# Patient Record
Sex: MALE | Race: WHITE | NOT HISPANIC OR LATINO
[De-identification: names, ages, dates, MRNs, and addresses within clinical notes are randomized per-mention and may not be internally consistent; named-entity substitution may affect disease eponyms.]

---

## 2017-01-05 ENCOUNTER — APPOINTMENT (OUTPATIENT)
Dept: CARDIOLOGY | Facility: CLINIC | Age: 82
End: 2017-01-05

## 2017-01-06 VITALS — BODY MASS INDEX: 23.8 KG/M2 | WEIGHT: 170 LBS | HEIGHT: 71 IN | HEART RATE: 75 BPM

## 2017-01-06 LAB
INR PPP: 2.5 RATIO
QUALITY CONTROL: YES

## 2017-02-02 ENCOUNTER — APPOINTMENT (OUTPATIENT)
Dept: CARDIOLOGY | Facility: CLINIC | Age: 82
End: 2017-02-02

## 2017-02-02 LAB — INR PPP: 1.9 RATIO

## 2017-02-16 ENCOUNTER — APPOINTMENT (OUTPATIENT)
Dept: CARDIOLOGY | Facility: CLINIC | Age: 82
End: 2017-02-16

## 2017-02-16 LAB — INR PPP: 2.8 RATIO

## 2017-03-02 ENCOUNTER — APPOINTMENT (OUTPATIENT)
Dept: CARDIOLOGY | Facility: CLINIC | Age: 82
End: 2017-03-02

## 2017-03-02 VITALS — BODY MASS INDEX: 23.8 KG/M2 | WEIGHT: 170 LBS | HEART RATE: 75 BPM | HEIGHT: 71 IN

## 2017-03-02 LAB
INR PPP: 1.8 RATIO
QUALITY CONTROL: YES

## 2017-03-16 ENCOUNTER — APPOINTMENT (OUTPATIENT)
Dept: CARDIOLOGY | Facility: CLINIC | Age: 82
End: 2017-03-16

## 2017-03-16 VITALS — WEIGHT: 170 LBS | BODY MASS INDEX: 23.8 KG/M2 | HEART RATE: 75 BPM | HEIGHT: 71 IN

## 2017-03-16 LAB
INR PPP: 2.6 RATIO
QUALITY CONTROL: YES

## 2017-03-27 ENCOUNTER — RX RENEWAL (OUTPATIENT)
Age: 82
End: 2017-03-27

## 2017-04-06 ENCOUNTER — APPOINTMENT (OUTPATIENT)
Dept: CARDIOLOGY | Facility: CLINIC | Age: 82
End: 2017-04-06

## 2017-04-06 LAB — INR PPP: 2.6 RATIO

## 2017-04-28 ENCOUNTER — RX RENEWAL (OUTPATIENT)
Age: 82
End: 2017-04-28

## 2017-05-04 ENCOUNTER — APPOINTMENT (OUTPATIENT)
Dept: CARDIOLOGY | Facility: CLINIC | Age: 82
End: 2017-05-04

## 2017-05-04 VITALS — WEIGHT: 170 LBS | HEART RATE: 75 BPM | HEIGHT: 71 IN | BODY MASS INDEX: 23.8 KG/M2

## 2017-05-04 LAB
INR PPP: 2.5 RATIO
QUALITY CONTROL: YES

## 2017-06-01 ENCOUNTER — APPOINTMENT (OUTPATIENT)
Dept: CARDIOLOGY | Facility: CLINIC | Age: 82
End: 2017-06-01

## 2017-06-01 VITALS — HEART RATE: 75 BPM | HEIGHT: 71 IN | WEIGHT: 170 LBS | BODY MASS INDEX: 23.8 KG/M2

## 2017-06-01 LAB
INR PPP: 2.3 RATIO
QUALITY CONTROL: YES

## 2017-06-29 ENCOUNTER — APPOINTMENT (OUTPATIENT)
Dept: CARDIOLOGY | Facility: CLINIC | Age: 82
End: 2017-06-29

## 2017-06-29 VITALS — BODY MASS INDEX: 23.8 KG/M2 | HEIGHT: 71 IN | HEART RATE: 75 BPM | WEIGHT: 170 LBS

## 2017-06-29 LAB — INR PPP: 2.7 RATIO

## 2017-07-27 ENCOUNTER — APPOINTMENT (OUTPATIENT)
Dept: CARDIOLOGY | Facility: CLINIC | Age: 82
End: 2017-07-27
Payer: MEDICARE

## 2017-07-27 VITALS — HEART RATE: 75 BPM | HEIGHT: 71 IN | BODY MASS INDEX: 23.8 KG/M2 | WEIGHT: 170 LBS

## 2017-07-27 PROCEDURE — 99211 OFF/OP EST MAY X REQ PHY/QHP: CPT

## 2017-07-27 PROCEDURE — 85610 PROTHROMBIN TIME: CPT | Mod: QW

## 2017-07-28 LAB — INR PPP: 3.3 RATIO

## 2017-08-22 ENCOUNTER — APPOINTMENT (OUTPATIENT)
Dept: CARDIOLOGY | Facility: CLINIC | Age: 82
End: 2017-08-22
Payer: MEDICARE

## 2017-08-22 VITALS — WEIGHT: 170 LBS | HEIGHT: 71 IN | HEART RATE: 75 BPM | BODY MASS INDEX: 23.8 KG/M2

## 2017-08-22 LAB
INR PPP: 2.1 RATIO
QUALITY CONTROL: YES

## 2017-08-22 PROCEDURE — 99211 OFF/OP EST MAY X REQ PHY/QHP: CPT

## 2017-08-22 PROCEDURE — 85610 PROTHROMBIN TIME: CPT | Mod: QW

## 2017-09-19 ENCOUNTER — APPOINTMENT (OUTPATIENT)
Dept: CARDIOLOGY | Facility: CLINIC | Age: 82
End: 2017-09-19
Payer: MEDICARE

## 2017-09-19 VITALS — BODY MASS INDEX: 23.8 KG/M2 | HEART RATE: 75 BPM | WEIGHT: 170 LBS | HEIGHT: 71 IN

## 2017-09-19 LAB
INR PPP: 2 RATIO
QUALITY CONTROL: YES

## 2017-09-19 PROCEDURE — 99211 OFF/OP EST MAY X REQ PHY/QHP: CPT

## 2017-09-19 PROCEDURE — 85610 PROTHROMBIN TIME: CPT | Mod: QW

## 2017-10-16 ENCOUNTER — RX RENEWAL (OUTPATIENT)
Age: 82
End: 2017-10-16

## 2017-10-19 ENCOUNTER — APPOINTMENT (OUTPATIENT)
Dept: CARDIOLOGY | Facility: CLINIC | Age: 82
End: 2017-10-19
Payer: MEDICARE

## 2017-10-19 VITALS — HEIGHT: 71 IN | HEART RATE: 75 BPM | BODY MASS INDEX: 23.8 KG/M2 | WEIGHT: 170 LBS

## 2017-10-19 LAB
INR PPP: 2.3 RATIO
QUALITY CONTROL: YES

## 2017-10-19 PROCEDURE — 85610 PROTHROMBIN TIME: CPT | Mod: QW

## 2017-10-19 PROCEDURE — 99211 OFF/OP EST MAY X REQ PHY/QHP: CPT

## 2017-10-24 ENCOUNTER — TRANSCRIPTION ENCOUNTER (OUTPATIENT)
Age: 82
End: 2017-10-24

## 2017-11-16 ENCOUNTER — APPOINTMENT (OUTPATIENT)
Dept: CARDIOLOGY | Facility: CLINIC | Age: 82
End: 2017-11-16
Payer: MEDICARE

## 2017-11-16 VITALS — SYSTOLIC BLOOD PRESSURE: 140 MMHG | DIASTOLIC BLOOD PRESSURE: 65 MMHG | HEART RATE: 75 BPM

## 2017-11-16 LAB — INR PPP: 2.1 RATIO

## 2017-11-16 PROCEDURE — 99211 OFF/OP EST MAY X REQ PHY/QHP: CPT

## 2017-11-16 PROCEDURE — 85610 PROTHROMBIN TIME: CPT | Mod: QW

## 2017-12-15 ENCOUNTER — APPOINTMENT (OUTPATIENT)
Dept: CARDIOLOGY | Facility: CLINIC | Age: 82
End: 2017-12-15
Payer: MEDICARE

## 2017-12-15 VITALS — BODY MASS INDEX: 23.8 KG/M2 | HEIGHT: 71 IN | HEART RATE: 75 BPM | WEIGHT: 170 LBS

## 2017-12-15 LAB
INR PPP: 2.3 RATIO
QUALITY CONTROL: YES

## 2017-12-15 PROCEDURE — 85610 PROTHROMBIN TIME: CPT | Mod: QW

## 2017-12-15 PROCEDURE — 99211 OFF/OP EST MAY X REQ PHY/QHP: CPT

## 2018-01-16 ENCOUNTER — APPOINTMENT (OUTPATIENT)
Dept: CARDIOLOGY | Facility: CLINIC | Age: 83
End: 2018-01-16
Payer: MEDICARE

## 2018-01-16 PROCEDURE — 85610 PROTHROMBIN TIME: CPT | Mod: QW

## 2018-01-16 PROCEDURE — 99211 OFF/OP EST MAY X REQ PHY/QHP: CPT

## 2018-01-17 VITALS — WEIGHT: 170 LBS | HEART RATE: 75 BPM | BODY MASS INDEX: 23.8 KG/M2 | HEIGHT: 71 IN

## 2018-01-17 LAB
INR PPP: 1.7 RATIO
INR PPP: 1.7 RATIO
QUALITY CONTROL: YES

## 2018-02-01 ENCOUNTER — APPOINTMENT (OUTPATIENT)
Dept: CARDIOLOGY | Facility: CLINIC | Age: 83
End: 2018-02-01
Payer: MEDICARE

## 2018-02-01 VITALS — HEIGHT: 71 IN | WEIGHT: 170 LBS | BODY MASS INDEX: 23.8 KG/M2 | HEART RATE: 75 BPM

## 2018-02-01 LAB
INR PPP: 2.3 RATIO
QUALITY CONTROL: YES

## 2018-02-01 PROCEDURE — 85610 PROTHROMBIN TIME: CPT | Mod: QW

## 2018-02-01 PROCEDURE — 99211 OFF/OP EST MAY X REQ PHY/QHP: CPT

## 2018-03-09 ENCOUNTER — APPOINTMENT (OUTPATIENT)
Dept: CARDIOLOGY | Facility: CLINIC | Age: 83
End: 2018-03-09
Payer: MEDICARE

## 2018-03-09 VITALS — HEIGHT: 71 IN | HEART RATE: 75 BPM | WEIGHT: 170 LBS | BODY MASS INDEX: 23.8 KG/M2

## 2018-03-09 LAB
INR PPP: 2.2 RATIO
QUALITY CONTROL: YES

## 2018-03-09 PROCEDURE — 99211 OFF/OP EST MAY X REQ PHY/QHP: CPT

## 2018-03-09 PROCEDURE — 85610 PROTHROMBIN TIME: CPT | Mod: QW

## 2018-04-05 ENCOUNTER — APPOINTMENT (OUTPATIENT)
Dept: CARDIOLOGY | Facility: CLINIC | Age: 83
End: 2018-04-05
Payer: MEDICARE

## 2018-04-05 VITALS — BODY MASS INDEX: 23.8 KG/M2 | HEART RATE: 75 BPM | WEIGHT: 170 LBS | HEIGHT: 71 IN

## 2018-04-05 LAB
INR PPP: 3.2 RATIO
QUALITY CONTROL: YES

## 2018-04-05 PROCEDURE — 99211 OFF/OP EST MAY X REQ PHY/QHP: CPT

## 2018-04-05 PROCEDURE — 85610 PROTHROMBIN TIME: CPT | Mod: QW

## 2018-04-23 ENCOUNTER — RX RENEWAL (OUTPATIENT)
Age: 83
End: 2018-04-23

## 2018-04-27 ENCOUNTER — RX RENEWAL (OUTPATIENT)
Age: 83
End: 2018-04-27

## 2018-05-03 ENCOUNTER — APPOINTMENT (OUTPATIENT)
Dept: CARDIOLOGY | Facility: CLINIC | Age: 83
End: 2018-05-03
Payer: MEDICARE

## 2018-05-03 VITALS — HEIGHT: 71 IN | BODY MASS INDEX: 23.8 KG/M2 | WEIGHT: 170 LBS | HEART RATE: 75 BPM

## 2018-05-03 LAB
INR PPP: 3.6 RATIO
QUALITY CONTROL: YES

## 2018-05-03 PROCEDURE — 99211 OFF/OP EST MAY X REQ PHY/QHP: CPT

## 2018-05-03 PROCEDURE — 85610 PROTHROMBIN TIME: CPT | Mod: QW

## 2018-05-24 ENCOUNTER — APPOINTMENT (OUTPATIENT)
Dept: CARDIOLOGY | Facility: CLINIC | Age: 83
End: 2018-05-24
Payer: MEDICARE

## 2018-05-24 VITALS — BODY MASS INDEX: 23.8 KG/M2 | WEIGHT: 170 LBS | HEIGHT: 71 IN | HEART RATE: 75 BPM

## 2018-05-24 LAB
INR PPP: 3.6 RATIO
QUALITY CONTROL: YES

## 2018-05-24 PROCEDURE — 99211 OFF/OP EST MAY X REQ PHY/QHP: CPT

## 2018-05-24 PROCEDURE — 85610 PROTHROMBIN TIME: CPT | Mod: QW

## 2018-06-08 ENCOUNTER — APPOINTMENT (OUTPATIENT)
Dept: CARDIOLOGY | Facility: CLINIC | Age: 83
End: 2018-06-08
Payer: MEDICARE

## 2018-06-08 VITALS — HEIGHT: 71 IN | WEIGHT: 170 LBS | HEART RATE: 75 BPM | BODY MASS INDEX: 23.8 KG/M2

## 2018-06-08 LAB
INR PPP: 2.3 RATIO
QUALITY CONTROL: YES

## 2018-06-08 PROCEDURE — 85610 PROTHROMBIN TIME: CPT | Mod: QW

## 2018-06-08 PROCEDURE — 99211 OFF/OP EST MAY X REQ PHY/QHP: CPT

## 2018-06-12 ENCOUNTER — RX RENEWAL (OUTPATIENT)
Age: 83
End: 2018-06-12

## 2018-06-26 ENCOUNTER — APPOINTMENT (OUTPATIENT)
Dept: CARDIOLOGY | Facility: CLINIC | Age: 83
End: 2018-06-26
Payer: MEDICARE

## 2018-06-26 VITALS — HEART RATE: 75 BPM | BODY MASS INDEX: 23.8 KG/M2 | WEIGHT: 170 LBS | HEIGHT: 71 IN

## 2018-06-26 LAB
INR PPP: 1.9 RATIO
QUALITY CONTROL: YES

## 2018-06-26 PROCEDURE — 85610 PROTHROMBIN TIME: CPT | Mod: QW

## 2018-06-26 PROCEDURE — 93793 ANTICOAG MGMT PT WARFARIN: CPT

## 2018-07-26 ENCOUNTER — APPOINTMENT (OUTPATIENT)
Dept: CARDIOLOGY | Facility: CLINIC | Age: 83
End: 2018-07-26
Payer: MEDICARE

## 2018-07-26 ENCOUNTER — NON-APPOINTMENT (OUTPATIENT)
Age: 83
End: 2018-07-26

## 2018-07-26 VITALS
HEART RATE: 122 BPM | DIASTOLIC BLOOD PRESSURE: 63 MMHG | OXYGEN SATURATION: 94 % | BODY MASS INDEX: 21.84 KG/M2 | HEIGHT: 71 IN | SYSTOLIC BLOOD PRESSURE: 150 MMHG | WEIGHT: 156 LBS

## 2018-07-26 PROCEDURE — 99215 OFFICE O/P EST HI 40 MIN: CPT

## 2018-07-26 PROCEDURE — 93000 ELECTROCARDIOGRAM COMPLETE: CPT

## 2018-07-26 PROCEDURE — 85610 PROTHROMBIN TIME: CPT | Mod: QW

## 2018-07-27 VITALS — BODY MASS INDEX: 21.84 KG/M2 | HEART RATE: 122 BPM | WEIGHT: 156 LBS | HEIGHT: 71 IN

## 2018-08-27 ENCOUNTER — APPOINTMENT (OUTPATIENT)
Dept: CARDIOLOGY | Facility: CLINIC | Age: 83
End: 2018-08-27
Payer: MEDICARE

## 2018-08-27 PROCEDURE — 93306 TTE W/DOPPLER COMPLETE: CPT

## 2018-08-29 ENCOUNTER — APPOINTMENT (OUTPATIENT)
Dept: CARDIOLOGY | Facility: CLINIC | Age: 83
End: 2018-08-29
Payer: MEDICARE

## 2018-08-29 PROCEDURE — 85610 PROTHROMBIN TIME: CPT | Mod: QW

## 2018-08-29 PROCEDURE — 93793 ANTICOAG MGMT PT WARFARIN: CPT

## 2018-08-30 VITALS — WEIGHT: 156 LBS | HEART RATE: 122 BPM | HEIGHT: 71 IN | BODY MASS INDEX: 21.84 KG/M2

## 2018-08-30 LAB
INR PPP: 1.8 RATIO
QUALITY CONTROL: NO

## 2018-09-19 ENCOUNTER — APPOINTMENT (OUTPATIENT)
Dept: CARDIOLOGY | Facility: CLINIC | Age: 83
End: 2018-09-19
Payer: MEDICARE

## 2018-09-19 VITALS — HEIGHT: 71 IN | WEIGHT: 156 LBS | HEART RATE: 122 BPM | BODY MASS INDEX: 21.84 KG/M2

## 2018-09-19 LAB
INR PPP: 2.5 RATIO
QUALITY CONTROL: YES

## 2018-09-19 PROCEDURE — 85610 PROTHROMBIN TIME: CPT | Mod: QW

## 2018-09-19 PROCEDURE — 93793 ANTICOAG MGMT PT WARFARIN: CPT

## 2018-10-17 ENCOUNTER — APPOINTMENT (OUTPATIENT)
Dept: CARDIOLOGY | Facility: CLINIC | Age: 83
End: 2018-10-17
Payer: MEDICARE

## 2018-10-17 VITALS — WEIGHT: 156 LBS | BODY MASS INDEX: 21.84 KG/M2 | HEART RATE: 122 BPM | HEIGHT: 71 IN

## 2018-10-17 LAB
INR PPP: 2 RATIO
QUALITY CONTROL: YES

## 2018-10-17 PROCEDURE — 85610 PROTHROMBIN TIME: CPT | Mod: QW

## 2018-10-17 PROCEDURE — 93793 ANTICOAG MGMT PT WARFARIN: CPT

## 2018-11-14 ENCOUNTER — APPOINTMENT (OUTPATIENT)
Dept: CARDIOLOGY | Facility: CLINIC | Age: 83
End: 2018-11-14
Payer: MEDICARE

## 2018-11-14 VITALS — WEIGHT: 156 LBS | HEIGHT: 71 IN | HEART RATE: 122 BPM | BODY MASS INDEX: 21.84 KG/M2

## 2018-11-14 LAB
INR PPP: 1.8 RATIO
QUALITY CONTROL: YES

## 2018-11-14 PROCEDURE — 85610 PROTHROMBIN TIME: CPT | Mod: QW

## 2018-11-14 PROCEDURE — 93793 ANTICOAG MGMT PT WARFARIN: CPT

## 2018-11-27 ENCOUNTER — APPOINTMENT (OUTPATIENT)
Dept: CARDIOLOGY | Facility: CLINIC | Age: 83
End: 2018-11-27
Payer: MEDICARE

## 2018-11-27 VITALS — BODY MASS INDEX: 21.84 KG/M2 | HEART RATE: 100 BPM | HEIGHT: 71 IN | WEIGHT: 156 LBS

## 2018-11-27 PROCEDURE — 85610 PROTHROMBIN TIME: CPT | Mod: QW

## 2018-11-27 PROCEDURE — 93793 ANTICOAG MGMT PT WARFARIN: CPT

## 2018-11-28 LAB
INR PPP: 2.6 RATIO
QUALITY CONTROL: YES

## 2018-12-27 ENCOUNTER — APPOINTMENT (OUTPATIENT)
Dept: CARDIOLOGY | Facility: CLINIC | Age: 83
End: 2018-12-27
Payer: MEDICARE

## 2018-12-27 VITALS — WEIGHT: 156 LBS | BODY MASS INDEX: 21.84 KG/M2 | HEIGHT: 71 IN | HEART RATE: 100 BPM

## 2018-12-27 LAB
INR PPP: 2.5 RATIO
QUALITY CONTROL: YES

## 2018-12-27 PROCEDURE — 85610 PROTHROMBIN TIME: CPT | Mod: QW

## 2018-12-27 PROCEDURE — 93793 ANTICOAG MGMT PT WARFARIN: CPT

## 2019-01-30 ENCOUNTER — APPOINTMENT (OUTPATIENT)
Dept: CARDIOLOGY | Facility: CLINIC | Age: 84
End: 2019-01-30
Payer: MEDICARE

## 2019-01-30 PROCEDURE — 93793 ANTICOAG MGMT PT WARFARIN: CPT

## 2019-01-30 PROCEDURE — 85610 PROTHROMBIN TIME: CPT | Mod: QW

## 2019-02-06 VITALS — HEIGHT: 71 IN | BODY MASS INDEX: 21.84 KG/M2 | WEIGHT: 156 LBS | HEART RATE: 100 BPM

## 2019-02-07 LAB
INR PPP: 1.7 RATIO
QUALITY CONTROL: YES

## 2019-02-20 ENCOUNTER — APPOINTMENT (OUTPATIENT)
Dept: CARDIOLOGY | Facility: CLINIC | Age: 84
End: 2019-02-20
Payer: MEDICARE

## 2019-02-20 VITALS — WEIGHT: 156 LBS | HEART RATE: 100 BPM | HEIGHT: 71 IN | BODY MASS INDEX: 21.84 KG/M2

## 2019-02-20 LAB
INR PPP: 2.2 RATIO
QUALITY CONTROL: YES

## 2019-02-20 PROCEDURE — 93793 ANTICOAG MGMT PT WARFARIN: CPT

## 2019-02-20 PROCEDURE — 85610 PROTHROMBIN TIME: CPT | Mod: QW

## 2019-03-20 ENCOUNTER — APPOINTMENT (OUTPATIENT)
Dept: CARDIOLOGY | Facility: CLINIC | Age: 84
End: 2019-03-20
Payer: MEDICARE

## 2019-03-20 VITALS — BODY MASS INDEX: 21.84 KG/M2 | HEIGHT: 71 IN | HEART RATE: 100 BPM | WEIGHT: 156 LBS

## 2019-03-20 LAB
INR PPP: 2 RATIO
QUALITY CONTROL: YES

## 2019-03-20 PROCEDURE — 93793 ANTICOAG MGMT PT WARFARIN: CPT

## 2019-03-20 PROCEDURE — 85610 PROTHROMBIN TIME: CPT | Mod: QW

## 2019-03-22 ENCOUNTER — APPOINTMENT (OUTPATIENT)
Dept: CARDIOLOGY | Facility: CLINIC | Age: 84
End: 2019-03-22

## 2019-04-17 ENCOUNTER — APPOINTMENT (OUTPATIENT)
Dept: CARDIOLOGY | Facility: CLINIC | Age: 84
End: 2019-04-17
Payer: MEDICARE

## 2019-04-17 VITALS — HEART RATE: 100 BPM | HEIGHT: 71 IN | WEIGHT: 156 LBS | BODY MASS INDEX: 21.84 KG/M2

## 2019-04-17 LAB
INR PPP: 2.6 RATIO
QUALITY CONTROL: YES

## 2019-04-17 PROCEDURE — 85610 PROTHROMBIN TIME: CPT | Mod: QW

## 2019-04-17 PROCEDURE — 93793 ANTICOAG MGMT PT WARFARIN: CPT

## 2019-05-15 ENCOUNTER — APPOINTMENT (OUTPATIENT)
Dept: CARDIOLOGY | Facility: CLINIC | Age: 84
End: 2019-05-15
Payer: MEDICARE

## 2019-05-15 VITALS — BODY MASS INDEX: 21.84 KG/M2 | HEART RATE: 100 BPM | HEIGHT: 71 IN | WEIGHT: 156 LBS

## 2019-05-15 LAB
INR PPP: 2.3 RATIO
QUALITY CONTROL: YES

## 2019-05-15 PROCEDURE — 85610 PROTHROMBIN TIME: CPT | Mod: QW

## 2019-05-15 PROCEDURE — 93793 ANTICOAG MGMT PT WARFARIN: CPT

## 2019-06-12 ENCOUNTER — APPOINTMENT (OUTPATIENT)
Dept: CARDIOLOGY | Facility: CLINIC | Age: 84
End: 2019-06-12
Payer: MEDICARE

## 2019-06-12 VITALS — BODY MASS INDEX: 21.84 KG/M2 | HEIGHT: 71 IN | WEIGHT: 156 LBS | HEART RATE: 100 BPM

## 2019-06-12 LAB
INR PPP: 1.8 RATIO
QUALITY CONTROL: YES

## 2019-06-12 PROCEDURE — 93793 ANTICOAG MGMT PT WARFARIN: CPT

## 2019-06-12 PROCEDURE — 85610 PROTHROMBIN TIME: CPT | Mod: QW

## 2019-06-26 ENCOUNTER — APPOINTMENT (OUTPATIENT)
Dept: CARDIOLOGY | Facility: CLINIC | Age: 84
End: 2019-06-26
Payer: MEDICARE

## 2019-06-26 VITALS — SYSTOLIC BLOOD PRESSURE: 150 MMHG | DIASTOLIC BLOOD PRESSURE: 63 MMHG | OXYGEN SATURATION: 94 % | HEART RATE: 100 BPM

## 2019-06-26 LAB
INR PPP: 2.1 RATIO
QUALITY CONTROL: YES

## 2019-06-26 PROCEDURE — 93793 ANTICOAG MGMT PT WARFARIN: CPT

## 2019-06-26 PROCEDURE — 85610 PROTHROMBIN TIME: CPT | Mod: QW

## 2019-07-24 ENCOUNTER — APPOINTMENT (OUTPATIENT)
Dept: CARDIOLOGY | Facility: CLINIC | Age: 84
End: 2019-07-24
Payer: MEDICARE

## 2019-07-24 VITALS — HEIGHT: 71 IN | BODY MASS INDEX: 21.84 KG/M2 | HEART RATE: 100 BPM | WEIGHT: 156 LBS

## 2019-07-24 LAB
INR PPP: 2.7 RATIO
QUALITY CONTROL: YES

## 2019-07-24 PROCEDURE — 93793 ANTICOAG MGMT PT WARFARIN: CPT

## 2019-07-24 PROCEDURE — 85610 PROTHROMBIN TIME: CPT | Mod: QW

## 2019-08-21 ENCOUNTER — APPOINTMENT (OUTPATIENT)
Dept: CARDIOLOGY | Facility: CLINIC | Age: 84
End: 2019-08-21
Payer: MEDICARE

## 2019-08-21 VITALS — BODY MASS INDEX: 21.84 KG/M2 | HEART RATE: 100 BPM | WEIGHT: 156 LBS | HEIGHT: 71 IN

## 2019-08-21 LAB
INR PPP: 1.8 RATIO
QUALITY CONTROL: YES

## 2019-08-21 PROCEDURE — 85610 PROTHROMBIN TIME: CPT | Mod: QW

## 2019-08-21 PROCEDURE — 93793 ANTICOAG MGMT PT WARFARIN: CPT

## 2019-09-03 ENCOUNTER — RX RENEWAL (OUTPATIENT)
Age: 84
End: 2019-09-03

## 2019-09-06 ENCOUNTER — RX RENEWAL (OUTPATIENT)
Age: 84
End: 2019-09-06

## 2019-09-18 ENCOUNTER — APPOINTMENT (OUTPATIENT)
Dept: CARDIOLOGY | Facility: CLINIC | Age: 84
End: 2019-09-18
Payer: MEDICARE

## 2019-09-18 VITALS — HEART RATE: 100 BPM | WEIGHT: 156 LBS | HEIGHT: 71 IN | BODY MASS INDEX: 21.84 KG/M2

## 2019-09-18 LAB
INR PPP: 2.3 RATIO
QUALITY CONTROL: YES

## 2019-09-18 PROCEDURE — 85610 PROTHROMBIN TIME: CPT | Mod: QW

## 2019-09-18 PROCEDURE — 93793 ANTICOAG MGMT PT WARFARIN: CPT

## 2019-10-16 ENCOUNTER — APPOINTMENT (OUTPATIENT)
Dept: CARDIOLOGY | Facility: CLINIC | Age: 84
End: 2019-10-16
Payer: MEDICARE

## 2019-10-16 VITALS — DIASTOLIC BLOOD PRESSURE: 63 MMHG | OXYGEN SATURATION: 94 % | SYSTOLIC BLOOD PRESSURE: 150 MMHG | HEART RATE: 100 BPM

## 2019-10-16 LAB
INR PPP: 2.2 RATIO
QUALITY CONTROL: YES

## 2019-10-16 PROCEDURE — 85610 PROTHROMBIN TIME: CPT | Mod: QW

## 2019-10-16 PROCEDURE — 93793 ANTICOAG MGMT PT WARFARIN: CPT

## 2019-11-19 ENCOUNTER — APPOINTMENT (OUTPATIENT)
Dept: CARDIOLOGY | Facility: CLINIC | Age: 84
End: 2019-11-19
Payer: MEDICARE

## 2019-11-19 VITALS — WEIGHT: 156 LBS | HEIGHT: 71 IN | HEART RATE: 95 BPM | BODY MASS INDEX: 21.84 KG/M2

## 2019-11-19 LAB
INR PPP: 2.1 RATIO
QUALITY CONTROL: YES

## 2019-11-19 PROCEDURE — 93793 ANTICOAG MGMT PT WARFARIN: CPT

## 2019-11-19 PROCEDURE — 85610 PROTHROMBIN TIME: CPT | Mod: QW

## 2019-12-18 ENCOUNTER — APPOINTMENT (OUTPATIENT)
Dept: CARDIOLOGY | Facility: CLINIC | Age: 84
End: 2019-12-18
Payer: MEDICARE

## 2019-12-18 VITALS — HEART RATE: 95 BPM | HEIGHT: 71 IN | BODY MASS INDEX: 21.84 KG/M2 | WEIGHT: 156 LBS

## 2019-12-18 LAB
INR PPP: 2.5 RATIO
QUALITY CONTROL: YES

## 2019-12-18 PROCEDURE — 93793 ANTICOAG MGMT PT WARFARIN: CPT

## 2019-12-18 PROCEDURE — 85610 PROTHROMBIN TIME: CPT | Mod: QW

## 2020-01-16 ENCOUNTER — APPOINTMENT (OUTPATIENT)
Dept: CARDIOLOGY | Facility: CLINIC | Age: 85
End: 2020-01-16
Payer: MEDICARE

## 2020-01-16 VITALS — BODY MASS INDEX: 21.84 KG/M2 | WEIGHT: 156 LBS | HEART RATE: 95 BPM | HEIGHT: 71 IN

## 2020-01-16 LAB
INR PPP: 3.1 RATIO
QUALITY CONTROL: YES

## 2020-01-16 PROCEDURE — 93793 ANTICOAG MGMT PT WARFARIN: CPT

## 2020-01-16 PROCEDURE — 85610 PROTHROMBIN TIME: CPT | Mod: QW

## 2020-02-18 ENCOUNTER — APPOINTMENT (OUTPATIENT)
Dept: CARDIOLOGY | Facility: CLINIC | Age: 85
End: 2020-02-18
Payer: MEDICARE

## 2020-02-18 VITALS — BODY MASS INDEX: 21.84 KG/M2 | WEIGHT: 156 LBS | HEART RATE: 95 BPM | HEIGHT: 71 IN

## 2020-02-18 LAB
INR PPP: 2.5 RATIO
QUALITY CONTROL: YES

## 2020-02-18 PROCEDURE — 85610 PROTHROMBIN TIME: CPT | Mod: QW

## 2020-02-18 PROCEDURE — 93793 ANTICOAG MGMT PT WARFARIN: CPT

## 2020-03-25 ENCOUNTER — APPOINTMENT (OUTPATIENT)
Dept: CARDIOLOGY | Facility: CLINIC | Age: 85
End: 2020-03-25

## 2020-03-31 LAB
INR PPP: 2.11 RATIO
PT BLD: 24.4 SEC

## 2020-04-26 LAB — INR PPP: 2.08

## 2020-05-12 ENCOUNTER — RX RENEWAL (OUTPATIENT)
Age: 85
End: 2020-05-12

## 2020-05-15 ENCOUNTER — APPOINTMENT (OUTPATIENT)
Dept: CARDIOLOGY | Facility: CLINIC | Age: 85
End: 2020-05-15

## 2020-05-24 ENCOUNTER — OUTPATIENT (OUTPATIENT)
Dept: OUTPATIENT SERVICES | Facility: HOSPITAL | Age: 85
LOS: 1 days | End: 2020-05-24

## 2020-05-26 ENCOUNTER — OUTPATIENT (OUTPATIENT)
Dept: OUTPATIENT SERVICES | Facility: HOSPITAL | Age: 85
LOS: 1 days | End: 2020-05-26

## 2020-06-02 ENCOUNTER — OUTPATIENT (OUTPATIENT)
Dept: OUTPATIENT SERVICES | Facility: HOSPITAL | Age: 85
LOS: 1 days | End: 2020-06-02

## 2020-06-05 ENCOUNTER — OUTPATIENT (OUTPATIENT)
Dept: OUTPATIENT SERVICES | Facility: HOSPITAL | Age: 85
LOS: 1 days | End: 2020-06-05

## 2020-06-09 ENCOUNTER — OUTPATIENT (OUTPATIENT)
Dept: OUTPATIENT SERVICES | Facility: HOSPITAL | Age: 85
LOS: 1 days | End: 2020-06-09

## 2020-06-16 ENCOUNTER — OUTPATIENT (OUTPATIENT)
Dept: OUTPATIENT SERVICES | Facility: HOSPITAL | Age: 85
LOS: 1 days | End: 2020-06-16

## 2020-06-19 DIAGNOSIS — Z51.81 ENCOUNTER FOR THERAPEUTIC DRUG LVL MONITORING: ICD-10-CM

## 2020-06-19 DIAGNOSIS — Z79.01 LONG TERM (CURRENT) USE OF ANTICOAGULANTS: ICD-10-CM

## 2020-06-19 DIAGNOSIS — Z79.01 ENCOUNTER FOR THERAPEUTIC DRUG LVL MONITORING: ICD-10-CM

## 2020-06-23 LAB — INR PPP: 1.99

## 2020-07-08 LAB — INR PPP: 2.86

## 2020-08-03 LAB — INR PPP: 2.87

## 2020-09-01 LAB — INR PPP: 3.31

## 2020-09-14 LAB — INR PPP: 3.7

## 2020-09-22 LAB — INR PPP: 2.6

## 2020-09-29 LAB — INR PPP: 1.38

## 2020-10-05 LAB — INR PPP: 1.52

## 2020-10-13 LAB — INR PPP: 1.93

## 2020-11-05 LAB — INR PPP: 3.07

## 2020-11-25 LAB — INR PPP: 2.51

## 2020-12-22 ENCOUNTER — RX RENEWAL (OUTPATIENT)
Age: 85
End: 2020-12-22

## 2020-12-22 LAB — INR PPP: 2.57

## 2020-12-23 ENCOUNTER — RX RENEWAL (OUTPATIENT)
Age: 85
End: 2020-12-23

## 2020-12-28 ENCOUNTER — RX RENEWAL (OUTPATIENT)
Age: 85
End: 2020-12-28

## 2021-01-19 LAB — INR PPP: 2.15

## 2021-02-17 LAB — INR PPP: 2.49

## 2021-03-18 LAB — INR PPP: 3.4

## 2021-03-31 LAB — INR PPP: 2.3

## 2021-04-21 LAB — INR PPP: 2.37

## 2021-05-14 LAB — INR PPP: 2.57

## 2021-06-17 LAB — INR PPP: 2.65

## 2021-07-16 LAB — INR PPP: 2.73

## 2021-08-13 LAB — INR PPP: 2.48

## 2021-08-20 ENCOUNTER — APPOINTMENT (OUTPATIENT)
Dept: CARDIOLOGY | Facility: CLINIC | Age: 86
End: 2021-08-20
Payer: MEDICARE

## 2021-08-20 ENCOUNTER — NON-APPOINTMENT (OUTPATIENT)
Age: 86
End: 2021-08-20

## 2021-08-20 VITALS
DIASTOLIC BLOOD PRESSURE: 51 MMHG | OXYGEN SATURATION: 99 % | HEIGHT: 71 IN | HEART RATE: 65 BPM | WEIGHT: 135 LBS | BODY MASS INDEX: 18.9 KG/M2 | SYSTOLIC BLOOD PRESSURE: 190 MMHG

## 2021-08-20 PROCEDURE — 99215 OFFICE O/P EST HI 40 MIN: CPT

## 2021-08-20 PROCEDURE — 93000 ELECTROCARDIOGRAM COMPLETE: CPT

## 2021-08-20 NOTE — PHYSICAL EXAM
[General Appearance - Well Developed] : well developed [Normal Appearance] : normal appearance [Well Groomed] : well groomed [General Appearance - Well Nourished] : well nourished [No Deformities] : no deformities [Normal Conjunctiva] : the conjunctiva exhibited no abnormalities [General Appearance - In No Acute Distress] : no acute distress [Eyelids - No Xanthelasma] : the eyelids demonstrated no xanthelasmas [Normal Oral Mucosa] : normal oral mucosa [No Oral Pallor] : no oral pallor [No Oral Cyanosis] : no oral cyanosis [No Jugular Venous Craig A Waves] : no jugular venous craig A waves [Respiration, Rhythm And Depth] : normal respiratory rhythm and effort [Exaggerated Use Of Accessory Muscles For Inspiration] : no accessory muscle use [Auscultation Breath Sounds / Voice Sounds] : lungs were clear to auscultation bilaterally [Abdomen Soft] : soft [Abdomen Tenderness] : non-tender [Abdomen Mass (___ Cm)] : no abdominal mass palpated [Abnormal Walk] : normal gait [Gait - Sufficient For Exercise Testing] : the gait was sufficient for exercise testing [Nail Clubbing] : no clubbing of the fingernails [Cyanosis, Localized] : no localized cyanosis [Petechial Hemorrhages (___cm)] : no petechial hemorrhages [Skin Color & Pigmentation] : normal skin color and pigmentation [] : no rash [No Venous Stasis] : no venous stasis [Skin Lesions] : no skin lesions [No Skin Ulcers] : no skin ulcer [No Xanthoma] : no  xanthoma was observed [Oriented To Time, Place, And Person] : oriented to person, place, and time [Affect] : the affect was normal [Mood] : the mood was normal [No Anxiety] : not feeling anxious [Not Palpable] : not palpable [Normal Rate] : normal [Irregularly Irregular] : irregularly irregular [Normal S1] : normal S1 [Normal S2] : normal S2 [No Gallop] : no gallop heard [I] : a grade 1 [II] : a grade 2 [III] : a grade 3 [2+] : left 2+ [1+] : left 1+ [S3] : no S3 [S4] : no S4 [Right Carotid Bruit] : no bruit heard over the right carotid [Left Carotid Bruit] : no bruit heard over the left carotid [Right Femoral Bruit] : no bruit heard over the right femoral artery [Left Femoral Bruit] : no bruit heard over the left femoral artery [Bruit] : no bruit heard [___ +] : bilateral [unfilled]U+ pitting edema to the knees

## 2021-08-20 NOTE — HISTORY OF PRESENT ILLNESS
[FreeTextEntry1] : Ananth Lilly presented to the office today for a followup cardiovascular evaluation. He was last seen in the office in 2018\par \par  He is now 89 years old, with a history of permanent atrial fibrillation, maintained on rate control therapy and chronic anticoagulant therapy. He has a history of moderate-severe aortic insufficiency and hypertension.  There is an emotional component to his blood pressure.\par \par  When Ananth was last seen in the office, he was feeling reasonably well. \par \par He continues to feel well. He reports no chest discomfort or difficulty breathing that would be suggestive of angina. His exercise capacity is fairly poor, and he generally walks with a walker. He tries to walk 3 driveways before turning around. He denies orthopnea, PND. He is not aware of lower extremity edema, but clearly has some today. He is not sure, when it is pointed out to him, how long it has been present. Apparently, his blood pressures have been well controlled when taken by the physical therapist who visits the house, and by his primary care physician.

## 2021-08-20 NOTE — DISCUSSION/SUMMARY
[FreeTextEntry1] : Ananth has a history of hypertension. He has a history of moderate-severe aortic insufficiency. He has atrial fibrillation. \par \par His exercise capacity is poor, but it is not clear whether this is related to atrial fibrillation, his aortic insufficiency, congestive heart failure, or something else. His blood pressure is extremely high, which likely has a component of reactive hypertension, but is also very likely related to his severe aortic insufficiency, noting the pulse pressure. He is fairly emphatic that his blood pressure has been previously excellent. He will start checking his blood pressure at home over the next week.\par \par He has developed moderate lower extremity edema. He will take Lasix 40 mg daily for the next 3 days, and then call the office for additional instructions. He may require this longer term, and will probably need his blood work repeated next week. He will schedule an echocardiogram to reevaluate his left ventricular dimensions, systolic function and the degree of aortic insufficiency. I would not be surprised if he now has severe aortic insufficiency. We might need to discuss options in terms of valve replacement, pending his clinical course. His age and functional status will be an obstacle.

## 2021-08-20 NOTE — REASON FOR VISIT
[Follow-Up - Clinic] : a clinic follow-up of [Atrial Fibrillation] : atrial fibrillation [Hypertension] : hypertension [FreeTextEntry1] : Aortic valve regurgitation

## 2021-09-09 ENCOUNTER — APPOINTMENT (OUTPATIENT)
Dept: CARDIOLOGY | Facility: CLINIC | Age: 86
End: 2021-09-09
Payer: MEDICARE

## 2021-09-09 PROCEDURE — 93306 TTE W/DOPPLER COMPLETE: CPT

## 2021-09-10 LAB — INR PPP: 2.77

## 2021-09-30 ENCOUNTER — APPOINTMENT (OUTPATIENT)
Dept: CARDIOLOGY | Facility: CLINIC | Age: 86
End: 2021-09-30
Payer: MEDICARE

## 2021-09-30 ENCOUNTER — NON-APPOINTMENT (OUTPATIENT)
Age: 86
End: 2021-09-30

## 2021-09-30 VITALS
HEIGHT: 71 IN | BODY MASS INDEX: 19.46 KG/M2 | HEART RATE: 54 BPM | DIASTOLIC BLOOD PRESSURE: 55 MMHG | WEIGHT: 139 LBS | SYSTOLIC BLOOD PRESSURE: 184 MMHG | OXYGEN SATURATION: 99 %

## 2021-09-30 PROCEDURE — 99215 OFFICE O/P EST HI 40 MIN: CPT

## 2021-09-30 PROCEDURE — 93000 ELECTROCARDIOGRAM COMPLETE: CPT

## 2021-09-30 NOTE — DISCUSSION/SUMMARY
[FreeTextEntry1] : Ananth has a history of hypertension. He has a history of moderate-severe aortic insufficiency. He has atrial fibrillation. \par \par It is notable that he continues to exhibit a very wide pulse pressure here in the office, with a very prominent diastolic murmur.  Overall this is quite consistent with severe aortic insufficiency.  We had an extensive conversation with his sons in the room, and with his daughter, and operating room nurse, by telephone.  We discussed the possibility of a transesophageal echo, which would be worthwhile if he were willing to proceed with some sort of valve surgery, transcatheter or otherwise.  Given his overall clinical condition, it is not likely he will be a good candidate for this.  They will discuss this amongst themselves.\par \par For now, he will continue furosemide once or twice per week.  His edema is only mild to moderate, worse in the left leg than in the right.  He will bring his blood pressure machine in with him in about 6 weeks so that we can evaluate how accurate it is.

## 2021-09-30 NOTE — CARDIOLOGY SUMMARY
[___] : [unfilled] [No Ischemia] : no Ischemia [No Exercise Ind Arr] : no exercise induced arrhythmias [No Symptoms] : no Symptoms [LVEF ___%] : LVEF [unfilled]% [None] : normal LV function [Mild] : mild pulmonary hypertension [Enlarged] : enlarged LA size [Moderate] : moderate mitral regurgitation

## 2021-09-30 NOTE — HISTORY OF PRESENT ILLNESS
[FreeTextEntry1] : Ananth Lilly presented to the office today for a followup cardiovascular evaluation. He was last seen in the office 1 month ago.\par \par  He is now 89 years old, with a history of permanent atrial fibrillation, maintained on rate control therapy and chronic anticoagulant therapy. He has a history of moderate-severe aortic insufficiency and hypertension.  There is an emotional component to his blood pressure.\par \par  When I saw him in the office last month, he had a very wide pulse pressure with severe systolic hypertension.  He had a very prominent diastolic murmur, and I felt that he was likely to have severe aortic insufficiency.  He had lower extremity edema at that time.  I recommended furosemide to be taken for a few days at least, with a follow-up to have his response to treatment reevaluated.  Since then, he has had an echocardiogram which was technically fair.  He only had mild aortic insufficiency noted at that time.  He took the furosemide for a few days, and did not really enjoy it.  He did not like going to the bathroom more often, and in general did not like how it made him feel.  He continues to have his blood pressure taken by the physical therapist, who has found that it is normal.  His blood pressure has been labile when it was checked by a machine at home.\par \par His daughter by phone emphasizes that he has lost about 40 pounds over the last few years.

## 2021-09-30 NOTE — PHYSICAL EXAM
[General Appearance - Well Developed] : well developed [Normal Appearance] : normal appearance [Well Groomed] : well groomed [General Appearance - Well Nourished] : well nourished [No Deformities] : no deformities [General Appearance - In No Acute Distress] : no acute distress [Normal Conjunctiva] : the conjunctiva exhibited no abnormalities [Eyelids - No Xanthelasma] : the eyelids demonstrated no xanthelasmas [Normal Oral Mucosa] : normal oral mucosa [No Oral Pallor] : no oral pallor [No Oral Cyanosis] : no oral cyanosis [No Jugular Venous Craig A Waves] : no jugular venous craig A waves [Respiration, Rhythm And Depth] : normal respiratory rhythm and effort [Exaggerated Use Of Accessory Muscles For Inspiration] : no accessory muscle use [Auscultation Breath Sounds / Voice Sounds] : lungs were clear to auscultation bilaterally [Abdomen Soft] : soft [Abdomen Tenderness] : non-tender [Abdomen Mass (___ Cm)] : no abdominal mass palpated [Abnormal Walk] : normal gait [Gait - Sufficient For Exercise Testing] : the gait was sufficient for exercise testing [Nail Clubbing] : no clubbing of the fingernails [Cyanosis, Localized] : no localized cyanosis [Petechial Hemorrhages (___cm)] : no petechial hemorrhages [Skin Color & Pigmentation] : normal skin color and pigmentation [] : no rash [No Venous Stasis] : no venous stasis [Skin Lesions] : no skin lesions [No Skin Ulcers] : no skin ulcer [No Xanthoma] : no  xanthoma was observed [Oriented To Time, Place, And Person] : oriented to person, place, and time [Affect] : the affect was normal [Mood] : the mood was normal [No Anxiety] : not feeling anxious [Not Palpable] : not palpable [Normal Rate] : normal [Irregularly Irregular] : irregularly irregular [Normal S1] : normal S1 [Normal S2] : normal S2 [No Gallop] : no gallop heard [I] : a grade 1 [II] : a grade 2 [III] : a grade 3 [2+] : left 2+ [1+] : left 1+ [___ +] : bilateral [unfilled]U+ pitting edema to the knees [S3] : no S3 [S4] : no S4 [Right Carotid Bruit] : no bruit heard over the right carotid [Left Carotid Bruit] : no bruit heard over the left carotid [Right Femoral Bruit] : no bruit heard over the right femoral artery [Left Femoral Bruit] : no bruit heard over the left femoral artery [Bruit] : no bruit heard

## 2021-10-11 LAB — INR PPP: 2.64

## 2021-11-10 ENCOUNTER — RX RENEWAL (OUTPATIENT)
Age: 86
End: 2021-11-10

## 2021-11-14 LAB — INR PPP: 3.09

## 2021-12-09 LAB — INR PPP: 2.65

## 2021-12-28 ENCOUNTER — LABORATORY RESULT (OUTPATIENT)
Age: 86
End: 2021-12-28

## 2021-12-29 LAB — INR PPP: 2.38

## 2022-01-25 ENCOUNTER — LABORATORY RESULT (OUTPATIENT)
Age: 87
End: 2022-01-25

## 2022-01-28 LAB — INR PPP: 2.51

## 2022-02-23 ENCOUNTER — LABORATORY RESULT (OUTPATIENT)
Age: 87
End: 2022-02-23

## 2022-02-24 LAB — INR PPP: 2.59

## 2022-03-01 ENCOUNTER — NON-APPOINTMENT (OUTPATIENT)
Age: 87
End: 2022-03-01

## 2022-03-01 ENCOUNTER — APPOINTMENT (OUTPATIENT)
Dept: CARDIOLOGY | Facility: CLINIC | Age: 87
End: 2022-03-01
Payer: MEDICARE

## 2022-03-01 VITALS
DIASTOLIC BLOOD PRESSURE: 47 MMHG | OXYGEN SATURATION: 100 % | HEIGHT: 71 IN | WEIGHT: 134 LBS | BODY MASS INDEX: 18.76 KG/M2 | HEART RATE: 61 BPM | SYSTOLIC BLOOD PRESSURE: 168 MMHG

## 2022-03-01 PROCEDURE — 99215 OFFICE O/P EST HI 40 MIN: CPT

## 2022-03-01 PROCEDURE — 93000 ELECTROCARDIOGRAM COMPLETE: CPT

## 2022-03-01 NOTE — DISCUSSION/SUMMARY
[FreeTextEntry1] : Ananth has a history of hypertension. He has a history of moderate-severe aortic insufficiency. He has atrial fibrillation.  He presents because surgery is being considered.\par \par I had an extensive conversation with Ananth, and with his son in the examining room.  I explained that Ananth is not an ideal candidate for surgery, and that he would need to clear several hurdles in order to make this happen.  Namely, he would have to have an echocardiogram done revealing no change in his ventricular function, and he would also need to have a nuclear stress test performed, given his very limited exercise capacity.  Even if those examinations come out well, he would be considered an elevated risk individual from the perspective of his cardiovascular status, and his risk but not be clearly modifiable.  I will not suggest any additional testing at this time, as he has not even seen the orthopedist yet.  If the orthopedist says that he would be willing to do surgery, based on his own evaluation, I would then consider scheduling the echo and the stress test, to see how he does.\par \par It is notable that he continues to exhibit a very wide pulse pressure here in the office, with a very prominent diastolic murmur.  Overall this is quite consistent with severe aortic insufficiency.  We had an extensive conversation with his sons in the room, and with his daughter, and operating room nurse, by telephone.  We discussed the possibility of a transesophageal echo, which would be worthwhile if he were willing to proceed with some sort of valve surgery, transcatheter or otherwise.  Given his overall clinical condition, it is not likely he will be a good candidate for this.  They will discuss this amongst themselves.\par \par For now, he will continue furosemide once or twice per week.  His edema is only mild to moderate, worse in the left leg than in the right.  He will bring his blood pressure machine in with him in about 6 weeks so that we can evaluate how accurate it is.

## 2022-03-01 NOTE — PHYSICAL EXAM
[General Appearance - Well Developed] : well developed [Normal Appearance] : normal appearance [Well Groomed] : well groomed [General Appearance - Well Nourished] : well nourished [No Deformities] : no deformities [General Appearance - In No Acute Distress] : no acute distress [Normal Conjunctiva] : the conjunctiva exhibited no abnormalities [Eyelids - No Xanthelasma] : the eyelids demonstrated no xanthelasmas [Normal Oral Mucosa] : normal oral mucosa [No Oral Pallor] : no oral pallor [No Oral Cyanosis] : no oral cyanosis [No Jugular Venous Craig A Waves] : no jugular venous craig A waves [Respiration, Rhythm And Depth] : normal respiratory rhythm and effort [Exaggerated Use Of Accessory Muscles For Inspiration] : no accessory muscle use [Auscultation Breath Sounds / Voice Sounds] : lungs were clear to auscultation bilaterally [Abdomen Soft] : soft [Abdomen Tenderness] : non-tender [Abdomen Mass (___ Cm)] : no abdominal mass palpated [Abnormal Walk] : normal gait [Gait - Sufficient For Exercise Testing] : the gait was sufficient for exercise testing [Nail Clubbing] : no clubbing of the fingernails [Cyanosis, Localized] : no localized cyanosis [Petechial Hemorrhages (___cm)] : no petechial hemorrhages [Skin Color & Pigmentation] : normal skin color and pigmentation [] : no rash [No Venous Stasis] : no venous stasis [No Skin Ulcers] : no skin ulcer [Skin Lesions] : no skin lesions [No Xanthoma] : no  xanthoma was observed [Oriented To Time, Place, And Person] : oriented to person, place, and time [Affect] : the affect was normal [Mood] : the mood was normal [No Anxiety] : not feeling anxious [Not Palpable] : not palpable [Normal Rate] : normal [Irregularly Irregular] : irregularly irregular [Normal S1] : normal S1 [Normal S2] : normal S2 [No Gallop] : no gallop heard [I] : a grade 1 [II] : a grade 2 [III] : a grade 3 [2+] : left 2+ [1+] : left 1+ [___ +] : bilateral [unfilled]U+ pitting edema to the knees [S3] : no S3 [S4] : no S4 [Right Carotid Bruit] : no bruit heard over the right carotid [Left Carotid Bruit] : no bruit heard over the left carotid [Right Femoral Bruit] : no bruit heard over the right femoral artery [Left Femoral Bruit] : no bruit heard over the left femoral artery [Bruit] : no bruit heard

## 2022-03-01 NOTE — HISTORY OF PRESENT ILLNESS
[FreeTextEntry1] : Ananth Lilly presented to the office today for a followup cardiovascular evaluation. He was last seen in the office 6 months ago.\par \par  He is now 89 years old, with a history of permanent atrial fibrillation, maintained on rate control therapy and chronic anticoagulant therapy. He has a history of moderate-severe aortic insufficiency and hypertension.  There is an emotional component to his blood pressure.\par \par  When I saw him in the office in August and September, 2021 he had a very wide pulse pressure with severe systolic hypertension.  He had a very prominent diastolic murmur, and I felt that he was likely to have severe aortic insufficiency.  He had lower extremity edema at that time.  I recommended furosemide to be taken for a few days at least, with a follow-up to have his response to treatment reevaluated.  An echocardiogram was performed which was technically fair.  He only had mild aortic insufficiency noted at that time.  He took the furosemide for a few days, and did not really enjoy it.  He did not like going to the bathroom more often, and in general did not like how it made him feel.   \par \par At the conclusion of the last visit, in September, we decided that it was quite likely that he has significant aortic insufficiency, with associated systolic hypertension, low diastolic blood pressures, and lower extremity edema.  We decided that it would be unlikely that he would be considered a good candidate for valve replacement, and that he was not really ready to pursue this.\par \par Over the last several months, overall he has done well from a cardiovascular perspective.  He has been compliant with his medications.  He remains with a tendency toward edema, which has not worsened.  He has not had dyspnea.  He is being considered for hip replacement surgery.  He has not yet seen the orthopedist, Dr. Hanks, but is going to see him tomorrow.

## 2022-03-23 ENCOUNTER — LABORATORY RESULT (OUTPATIENT)
Age: 87
End: 2022-03-23

## 2022-03-24 LAB — INR PPP: 2.51

## 2022-04-20 ENCOUNTER — LABORATORY RESULT (OUTPATIENT)
Age: 87
End: 2022-04-20

## 2022-04-21 LAB — INR PPP: 3.23

## 2022-04-30 LAB — INR PPP: 2.47

## 2022-05-11 ENCOUNTER — LABORATORY RESULT (OUTPATIENT)
Age: 87
End: 2022-05-11

## 2022-05-13 LAB — INR PPP: 2.98

## 2022-06-02 ENCOUNTER — LABORATORY RESULT (OUTPATIENT)
Age: 87
End: 2022-06-02

## 2022-06-06 LAB — INR PPP: 3.09

## 2022-06-30 ENCOUNTER — LABORATORY RESULT (OUTPATIENT)
Age: 87
End: 2022-06-30

## 2022-07-01 LAB — INR PPP: 2.4

## 2022-07-28 ENCOUNTER — LABORATORY RESULT (OUTPATIENT)
Age: 87
End: 2022-07-28

## 2022-07-28 ENCOUNTER — NON-APPOINTMENT (OUTPATIENT)
Age: 87
End: 2022-07-28

## 2022-07-29 LAB — INR PPP: 2.09

## 2022-08-24 ENCOUNTER — RX RENEWAL (OUTPATIENT)
Age: 87
End: 2022-08-24

## 2022-08-25 ENCOUNTER — LABORATORY RESULT (OUTPATIENT)
Age: 87
End: 2022-08-25

## 2022-08-25 ENCOUNTER — NON-APPOINTMENT (OUTPATIENT)
Age: 87
End: 2022-08-25

## 2022-09-21 ENCOUNTER — LABORATORY RESULT (OUTPATIENT)
Age: 87
End: 2022-09-21

## 2022-09-23 ENCOUNTER — NON-APPOINTMENT (OUTPATIENT)
Age: 87
End: 2022-09-23

## 2022-10-20 ENCOUNTER — LABORATORY RESULT (OUTPATIENT)
Age: 87
End: 2022-10-20

## 2022-10-20 ENCOUNTER — NON-APPOINTMENT (OUTPATIENT)
Age: 87
End: 2022-10-20

## 2022-10-27 ENCOUNTER — NON-APPOINTMENT (OUTPATIENT)
Age: 87
End: 2022-10-27

## 2022-10-27 ENCOUNTER — LABORATORY RESULT (OUTPATIENT)
Age: 87
End: 2022-10-27

## 2022-10-28 ENCOUNTER — NON-APPOINTMENT (OUTPATIENT)
Age: 87
End: 2022-10-28

## 2022-11-03 ENCOUNTER — LABORATORY RESULT (OUTPATIENT)
Age: 87
End: 2022-11-03

## 2022-11-04 ENCOUNTER — NON-APPOINTMENT (OUTPATIENT)
Age: 87
End: 2022-11-04

## 2022-11-21 ENCOUNTER — LABORATORY RESULT (OUTPATIENT)
Age: 87
End: 2022-11-21

## 2022-11-22 ENCOUNTER — NON-APPOINTMENT (OUTPATIENT)
Age: 87
End: 2022-11-22

## 2022-12-13 ENCOUNTER — LABORATORY RESULT (OUTPATIENT)
Age: 87
End: 2022-12-13

## 2022-12-13 ENCOUNTER — NON-APPOINTMENT (OUTPATIENT)
Age: 87
End: 2022-12-13

## 2022-12-20 ENCOUNTER — LABORATORY RESULT (OUTPATIENT)
Age: 87
End: 2022-12-20

## 2022-12-21 ENCOUNTER — NON-APPOINTMENT (OUTPATIENT)
Age: 87
End: 2022-12-21

## 2023-01-03 ENCOUNTER — LABORATORY RESULT (OUTPATIENT)
Age: 88
End: 2023-01-03

## 2023-01-04 ENCOUNTER — NON-APPOINTMENT (OUTPATIENT)
Age: 88
End: 2023-01-04

## 2023-01-11 ENCOUNTER — NON-APPOINTMENT (OUTPATIENT)
Age: 88
End: 2023-01-11

## 2023-01-11 ENCOUNTER — LABORATORY RESULT (OUTPATIENT)
Age: 88
End: 2023-01-11

## 2023-01-25 ENCOUNTER — LABORATORY RESULT (OUTPATIENT)
Age: 88
End: 2023-01-25

## 2023-01-26 ENCOUNTER — NON-APPOINTMENT (OUTPATIENT)
Age: 88
End: 2023-01-26

## 2023-02-16 ENCOUNTER — LABORATORY RESULT (OUTPATIENT)
Age: 88
End: 2023-02-16

## 2023-02-16 ENCOUNTER — NON-APPOINTMENT (OUTPATIENT)
Age: 88
End: 2023-02-16

## 2023-03-16 ENCOUNTER — LABORATORY RESULT (OUTPATIENT)
Age: 88
End: 2023-03-16

## 2023-03-17 ENCOUNTER — NON-APPOINTMENT (OUTPATIENT)
Age: 88
End: 2023-03-17

## 2023-03-23 ENCOUNTER — LABORATORY RESULT (OUTPATIENT)
Age: 88
End: 2023-03-23

## 2023-03-24 ENCOUNTER — NON-APPOINTMENT (OUTPATIENT)
Age: 88
End: 2023-03-24

## 2023-04-13 ENCOUNTER — LABORATORY RESULT (OUTPATIENT)
Age: 88
End: 2023-04-13

## 2023-05-04 ENCOUNTER — LABORATORY RESULT (OUTPATIENT)
Age: 88
End: 2023-05-04

## 2023-05-05 ENCOUNTER — NON-APPOINTMENT (OUTPATIENT)
Age: 88
End: 2023-05-05

## 2023-06-01 ENCOUNTER — LABORATORY RESULT (OUTPATIENT)
Age: 88
End: 2023-06-01

## 2023-06-02 ENCOUNTER — NON-APPOINTMENT (OUTPATIENT)
Age: 88
End: 2023-06-02

## 2023-06-08 ENCOUNTER — LABORATORY RESULT (OUTPATIENT)
Age: 88
End: 2023-06-08

## 2023-06-08 ENCOUNTER — NON-APPOINTMENT (OUTPATIENT)
Age: 88
End: 2023-06-08

## 2023-06-27 ENCOUNTER — RX RENEWAL (OUTPATIENT)
Age: 88
End: 2023-06-27

## 2023-06-29 ENCOUNTER — LABORATORY RESULT (OUTPATIENT)
Age: 88
End: 2023-06-29

## 2023-06-30 ENCOUNTER — NON-APPOINTMENT (OUTPATIENT)
Age: 88
End: 2023-06-30

## 2023-07-19 ENCOUNTER — RX RENEWAL (OUTPATIENT)
Age: 88
End: 2023-07-19

## 2023-07-26 ENCOUNTER — NON-APPOINTMENT (OUTPATIENT)
Age: 88
End: 2023-07-26

## 2023-07-26 ENCOUNTER — LABORATORY RESULT (OUTPATIENT)
Age: 88
End: 2023-07-26

## 2023-08-03 DIAGNOSIS — R26.81 UNSTEADINESS ON FEET: ICD-10-CM

## 2023-08-09 ENCOUNTER — LABORATORY RESULT (OUTPATIENT)
Age: 88
End: 2023-08-09

## 2023-08-09 ENCOUNTER — NON-APPOINTMENT (OUTPATIENT)
Age: 88
End: 2023-08-09

## 2023-09-06 ENCOUNTER — LABORATORY RESULT (OUTPATIENT)
Age: 88
End: 2023-09-06

## 2023-09-06 ENCOUNTER — NON-APPOINTMENT (OUTPATIENT)
Age: 88
End: 2023-09-06

## 2023-10-04 ENCOUNTER — NON-APPOINTMENT (OUTPATIENT)
Age: 88
End: 2023-10-04

## 2023-10-04 ENCOUNTER — LABORATORY RESULT (OUTPATIENT)
Age: 88
End: 2023-10-04

## 2023-10-05 ENCOUNTER — RX RENEWAL (OUTPATIENT)
Age: 88
End: 2023-10-05

## 2023-10-05 RX ORDER — FUROSEMIDE 40 MG/1
40 TABLET ORAL EVERY OTHER DAY
Qty: 30 | Refills: 0 | Status: ACTIVE | COMMUNITY
Start: 2021-08-20 | End: 1900-01-01

## 2023-10-18 ENCOUNTER — INPATIENT (INPATIENT)
Facility: HOSPITAL | Age: 88
LOS: 4 days | Discharge: NURSING, REHAB OR LONG-TERM CARE | DRG: 871 | End: 2023-10-23
Attending: INTERNAL MEDICINE | Admitting: INTERNAL MEDICINE
Payer: MEDICARE

## 2023-10-18 VITALS
SYSTOLIC BLOOD PRESSURE: 167 MMHG | HEIGHT: 71 IN | OXYGEN SATURATION: 98 % | DIASTOLIC BLOOD PRESSURE: 55 MMHG | HEART RATE: 50 BPM | RESPIRATION RATE: 18 BRPM | TEMPERATURE: 98 F | WEIGHT: 149.91 LBS

## 2023-10-18 DIAGNOSIS — J18.9 PNEUMONIA, UNSPECIFIED ORGANISM: ICD-10-CM

## 2023-10-18 LAB
ALBUMIN SERPL ELPH-MCNC: 2.9 G/DL — LOW (ref 3.3–5)
ALBUMIN SERPL ELPH-MCNC: 2.9 G/DL — LOW (ref 3.3–5)
ALP SERPL-CCNC: 79 U/L — SIGNIFICANT CHANGE UP (ref 40–120)
ALP SERPL-CCNC: 79 U/L — SIGNIFICANT CHANGE UP (ref 40–120)
ALT FLD-CCNC: 22 U/L — SIGNIFICANT CHANGE UP (ref 12–78)
ALT FLD-CCNC: 22 U/L — SIGNIFICANT CHANGE UP (ref 12–78)
ANION GAP SERPL CALC-SCNC: 12 MMOL/L — SIGNIFICANT CHANGE UP (ref 5–17)
ANION GAP SERPL CALC-SCNC: 12 MMOL/L — SIGNIFICANT CHANGE UP (ref 5–17)
APPEARANCE UR: ABNORMAL
APPEARANCE UR: ABNORMAL
APTT BLD: 59.4 SEC — HIGH (ref 24.5–35.6)
APTT BLD: 59.4 SEC — HIGH (ref 24.5–35.6)
AST SERPL-CCNC: 29 U/L — SIGNIFICANT CHANGE UP (ref 15–37)
AST SERPL-CCNC: 29 U/L — SIGNIFICANT CHANGE UP (ref 15–37)
BACTERIA # UR AUTO: ABNORMAL /HPF
BACTERIA # UR AUTO: ABNORMAL /HPF
BASOPHILS # BLD AUTO: 0.02 K/UL — SIGNIFICANT CHANGE UP (ref 0–0.2)
BASOPHILS # BLD AUTO: 0.02 K/UL — SIGNIFICANT CHANGE UP (ref 0–0.2)
BASOPHILS # BLD AUTO: 0.03 K/UL — SIGNIFICANT CHANGE UP (ref 0–0.2)
BASOPHILS NFR BLD AUTO: 0.2 % — SIGNIFICANT CHANGE UP (ref 0–2)
BASOPHILS NFR BLD AUTO: 0.2 % — SIGNIFICANT CHANGE UP (ref 0–2)
BASOPHILS NFR BLD AUTO: 0.3 % — SIGNIFICANT CHANGE UP (ref 0–2)
BILIRUB SERPL-MCNC: 1.1 MG/DL — SIGNIFICANT CHANGE UP (ref 0.2–1.2)
BILIRUB SERPL-MCNC: 1.1 MG/DL — SIGNIFICANT CHANGE UP (ref 0.2–1.2)
BILIRUB UR-MCNC: ABNORMAL
BILIRUB UR-MCNC: ABNORMAL
BUN SERPL-MCNC: 44 MG/DL — HIGH (ref 7–23)
BUN SERPL-MCNC: 44 MG/DL — HIGH (ref 7–23)
CALCIUM SERPL-MCNC: 8.5 MG/DL — SIGNIFICANT CHANGE UP (ref 8.5–10.1)
CALCIUM SERPL-MCNC: 8.5 MG/DL — SIGNIFICANT CHANGE UP (ref 8.5–10.1)
CHLORIDE SERPL-SCNC: 102 MMOL/L — SIGNIFICANT CHANGE UP (ref 96–108)
CHLORIDE SERPL-SCNC: 102 MMOL/L — SIGNIFICANT CHANGE UP (ref 96–108)
CO2 SERPL-SCNC: 25 MMOL/L — SIGNIFICANT CHANGE UP (ref 22–31)
CO2 SERPL-SCNC: 25 MMOL/L — SIGNIFICANT CHANGE UP (ref 22–31)
COLOR SPEC: ABNORMAL
COLOR SPEC: ABNORMAL
CREAT SERPL-MCNC: 1.6 MG/DL — HIGH (ref 0.5–1.3)
CREAT SERPL-MCNC: 1.6 MG/DL — HIGH (ref 0.5–1.3)
DIFF PNL FLD: ABNORMAL
DIFF PNL FLD: ABNORMAL
EGFR: 40 ML/MIN/1.73M2 — LOW
EGFR: 40 ML/MIN/1.73M2 — LOW
EOSINOPHIL # BLD AUTO: 0.06 K/UL — SIGNIFICANT CHANGE UP (ref 0–0.5)
EOSINOPHIL # BLD AUTO: 0.12 K/UL — SIGNIFICANT CHANGE UP (ref 0–0.5)
EOSINOPHIL # BLD AUTO: 0.12 K/UL — SIGNIFICANT CHANGE UP (ref 0–0.5)
EOSINOPHIL NFR BLD AUTO: 0.5 % — SIGNIFICANT CHANGE UP (ref 0–6)
EOSINOPHIL NFR BLD AUTO: 1 % — SIGNIFICANT CHANGE UP (ref 0–6)
EOSINOPHIL NFR BLD AUTO: 1 % — SIGNIFICANT CHANGE UP (ref 0–6)
EPI CELLS # UR: PRESENT
EPI CELLS # UR: PRESENT
GLUCOSE SERPL-MCNC: 96 MG/DL — SIGNIFICANT CHANGE UP (ref 70–99)
GLUCOSE SERPL-MCNC: 96 MG/DL — SIGNIFICANT CHANGE UP (ref 70–99)
GLUCOSE UR QL: NEGATIVE MG/DL — SIGNIFICANT CHANGE UP
GLUCOSE UR QL: NEGATIVE MG/DL — SIGNIFICANT CHANGE UP
HCT VFR BLD CALC: 33.4 % — LOW (ref 39–50)
HCT VFR BLD CALC: 33.4 % — LOW (ref 39–50)
HCT VFR BLD CALC: 35.3 % — LOW (ref 39–50)
HCT VFR BLD CALC: 35.3 % — LOW (ref 39–50)
HCT VFR BLD CALC: 37 % — LOW (ref 39–50)
HCT VFR BLD CALC: 37 % — LOW (ref 39–50)
HGB BLD-MCNC: 11.3 G/DL — LOW (ref 13–17)
HGB BLD-MCNC: 11.3 G/DL — LOW (ref 13–17)
HGB BLD-MCNC: 12 G/DL — LOW (ref 13–17)
HGB BLD-MCNC: 12 G/DL — LOW (ref 13–17)
HGB BLD-MCNC: 12.4 G/DL — LOW (ref 13–17)
HGB BLD-MCNC: 12.4 G/DL — LOW (ref 13–17)
IMM GRANULOCYTES NFR BLD AUTO: 0.5 % — SIGNIFICANT CHANGE UP (ref 0–0.9)
IMM GRANULOCYTES NFR BLD AUTO: 0.5 % — SIGNIFICANT CHANGE UP (ref 0–0.9)
IMM GRANULOCYTES NFR BLD AUTO: 0.6 % — SIGNIFICANT CHANGE UP (ref 0–0.9)
INR BLD: 8.07 RATIO — CRITICAL HIGH (ref 0.85–1.18)
INR BLD: 8.07 RATIO — CRITICAL HIGH (ref 0.85–1.18)
INR BLD: 8.77 RATIO — CRITICAL HIGH (ref 0.85–1.18)
INR BLD: 8.77 RATIO — CRITICAL HIGH (ref 0.85–1.18)
KETONES UR-MCNC: NEGATIVE MG/DL — SIGNIFICANT CHANGE UP
KETONES UR-MCNC: NEGATIVE MG/DL — SIGNIFICANT CHANGE UP
LACTATE SERPL-SCNC: 1.1 MMOL/L — SIGNIFICANT CHANGE UP (ref 0.7–2)
LACTATE SERPL-SCNC: 1.1 MMOL/L — SIGNIFICANT CHANGE UP (ref 0.7–2)
LEUKOCYTE ESTERASE UR-ACNC: ABNORMAL
LEUKOCYTE ESTERASE UR-ACNC: ABNORMAL
LYMPHOCYTES # BLD AUTO: 0.68 K/UL — LOW (ref 1–3.3)
LYMPHOCYTES # BLD AUTO: 0.68 K/UL — LOW (ref 1–3.3)
LYMPHOCYTES # BLD AUTO: 0.73 K/UL — LOW (ref 1–3.3)
LYMPHOCYTES # BLD AUTO: 0.73 K/UL — LOW (ref 1–3.3)
LYMPHOCYTES # BLD AUTO: 1.03 K/UL — SIGNIFICANT CHANGE UP (ref 1–3.3)
LYMPHOCYTES # BLD AUTO: 1.03 K/UL — SIGNIFICANT CHANGE UP (ref 1–3.3)
LYMPHOCYTES # BLD AUTO: 5.7 % — LOW (ref 13–44)
LYMPHOCYTES # BLD AUTO: 5.7 % — LOW (ref 13–44)
LYMPHOCYTES # BLD AUTO: 6.1 % — LOW (ref 13–44)
LYMPHOCYTES # BLD AUTO: 6.1 % — LOW (ref 13–44)
LYMPHOCYTES # BLD AUTO: 8.6 % — LOW (ref 13–44)
LYMPHOCYTES # BLD AUTO: 8.6 % — LOW (ref 13–44)
MCHC RBC-ENTMCNC: 31.3 PG — SIGNIFICANT CHANGE UP (ref 27–34)
MCHC RBC-ENTMCNC: 31.3 PG — SIGNIFICANT CHANGE UP (ref 27–34)
MCHC RBC-ENTMCNC: 31.4 PG — SIGNIFICANT CHANGE UP (ref 27–34)
MCHC RBC-ENTMCNC: 31.4 PG — SIGNIFICANT CHANGE UP (ref 27–34)
MCHC RBC-ENTMCNC: 31.7 PG — SIGNIFICANT CHANGE UP (ref 27–34)
MCHC RBC-ENTMCNC: 31.7 PG — SIGNIFICANT CHANGE UP (ref 27–34)
MCHC RBC-ENTMCNC: 33.5 GM/DL — SIGNIFICANT CHANGE UP (ref 32–36)
MCHC RBC-ENTMCNC: 33.5 GM/DL — SIGNIFICANT CHANGE UP (ref 32–36)
MCHC RBC-ENTMCNC: 33.8 GM/DL — SIGNIFICANT CHANGE UP (ref 32–36)
MCHC RBC-ENTMCNC: 33.8 GM/DL — SIGNIFICANT CHANGE UP (ref 32–36)
MCHC RBC-ENTMCNC: 34 GM/DL — SIGNIFICANT CHANGE UP (ref 32–36)
MCHC RBC-ENTMCNC: 34 GM/DL — SIGNIFICANT CHANGE UP (ref 32–36)
MCV RBC AUTO: 92.5 FL — SIGNIFICANT CHANGE UP (ref 80–100)
MCV RBC AUTO: 92.5 FL — SIGNIFICANT CHANGE UP (ref 80–100)
MCV RBC AUTO: 93.1 FL — SIGNIFICANT CHANGE UP (ref 80–100)
MCV RBC AUTO: 93.1 FL — SIGNIFICANT CHANGE UP (ref 80–100)
MCV RBC AUTO: 93.7 FL — SIGNIFICANT CHANGE UP (ref 80–100)
MCV RBC AUTO: 93.7 FL — SIGNIFICANT CHANGE UP (ref 80–100)
MONOCYTES # BLD AUTO: 1.04 K/UL — HIGH (ref 0–0.9)
MONOCYTES # BLD AUTO: 1.04 K/UL — HIGH (ref 0–0.9)
MONOCYTES # BLD AUTO: 1.07 K/UL — HIGH (ref 0–0.9)
MONOCYTES # BLD AUTO: 1.07 K/UL — HIGH (ref 0–0.9)
MONOCYTES # BLD AUTO: 1.11 K/UL — HIGH (ref 0–0.9)
MONOCYTES # BLD AUTO: 1.11 K/UL — HIGH (ref 0–0.9)
MONOCYTES NFR BLD AUTO: 8.7 % — SIGNIFICANT CHANGE UP (ref 2–14)
MONOCYTES NFR BLD AUTO: 8.7 % — SIGNIFICANT CHANGE UP (ref 2–14)
MONOCYTES NFR BLD AUTO: 8.9 % — SIGNIFICANT CHANGE UP (ref 2–14)
MONOCYTES NFR BLD AUTO: 8.9 % — SIGNIFICANT CHANGE UP (ref 2–14)
MONOCYTES NFR BLD AUTO: 9.3 % — SIGNIFICANT CHANGE UP (ref 2–14)
MONOCYTES NFR BLD AUTO: 9.3 % — SIGNIFICANT CHANGE UP (ref 2–14)
NEUTROPHILS # BLD AUTO: 10.02 K/UL — HIGH (ref 1.8–7.4)
NEUTROPHILS # BLD AUTO: 10.02 K/UL — HIGH (ref 1.8–7.4)
NEUTROPHILS # BLD AUTO: 10.06 K/UL — HIGH (ref 1.8–7.4)
NEUTROPHILS # BLD AUTO: 10.06 K/UL — HIGH (ref 1.8–7.4)
NEUTROPHILS # BLD AUTO: 9.57 K/UL — HIGH (ref 1.8–7.4)
NEUTROPHILS # BLD AUTO: 9.57 K/UL — HIGH (ref 1.8–7.4)
NEUTROPHILS NFR BLD AUTO: 80.2 % — HIGH (ref 43–77)
NEUTROPHILS NFR BLD AUTO: 80.2 % — HIGH (ref 43–77)
NEUTROPHILS NFR BLD AUTO: 83.7 % — HIGH (ref 43–77)
NEUTROPHILS NFR BLD AUTO: 83.7 % — HIGH (ref 43–77)
NEUTROPHILS NFR BLD AUTO: 84.3 % — HIGH (ref 43–77)
NEUTROPHILS NFR BLD AUTO: 84.3 % — HIGH (ref 43–77)
NITRITE UR-MCNC: POSITIVE
NITRITE UR-MCNC: POSITIVE
NRBC # BLD: 0 /100 WBCS — SIGNIFICANT CHANGE UP (ref 0–0)
PH UR: 5 — SIGNIFICANT CHANGE UP (ref 5–8)
PH UR: 5 — SIGNIFICANT CHANGE UP (ref 5–8)
PLATELET # BLD AUTO: 318 K/UL — SIGNIFICANT CHANGE UP (ref 150–400)
PLATELET # BLD AUTO: 318 K/UL — SIGNIFICANT CHANGE UP (ref 150–400)
PLATELET # BLD AUTO: 329 K/UL — SIGNIFICANT CHANGE UP (ref 150–400)
PLATELET # BLD AUTO: 329 K/UL — SIGNIFICANT CHANGE UP (ref 150–400)
PLATELET # BLD AUTO: 365 K/UL — SIGNIFICANT CHANGE UP (ref 150–400)
PLATELET # BLD AUTO: 365 K/UL — SIGNIFICANT CHANGE UP (ref 150–400)
POTASSIUM SERPL-MCNC: 3.1 MMOL/L — LOW (ref 3.5–5.3)
POTASSIUM SERPL-MCNC: 3.1 MMOL/L — LOW (ref 3.5–5.3)
POTASSIUM SERPL-SCNC: 3.1 MMOL/L — LOW (ref 3.5–5.3)
POTASSIUM SERPL-SCNC: 3.1 MMOL/L — LOW (ref 3.5–5.3)
PROT SERPL-MCNC: 6.9 G/DL — SIGNIFICANT CHANGE UP (ref 6–8.3)
PROT SERPL-MCNC: 6.9 G/DL — SIGNIFICANT CHANGE UP (ref 6–8.3)
PROT UR-MCNC: 300 MG/DL
PROT UR-MCNC: 300 MG/DL
PROTHROM AB SERPL-ACNC: 88.9 SEC — HIGH (ref 9.5–13)
PROTHROM AB SERPL-ACNC: 88.9 SEC — HIGH (ref 9.5–13)
PROTHROM AB SERPL-ACNC: 96.3 SEC — HIGH (ref 9.5–13)
PROTHROM AB SERPL-ACNC: 96.3 SEC — HIGH (ref 9.5–13)
RAPID RVP RESULT: DETECTED
RAPID RVP RESULT: DETECTED
RBC # BLD: 3.61 M/UL — LOW (ref 4.2–5.8)
RBC # BLD: 3.61 M/UL — LOW (ref 4.2–5.8)
RBC # BLD: 3.79 M/UL — LOW (ref 4.2–5.8)
RBC # BLD: 3.79 M/UL — LOW (ref 4.2–5.8)
RBC # BLD: 3.95 M/UL — LOW (ref 4.2–5.8)
RBC # BLD: 3.95 M/UL — LOW (ref 4.2–5.8)
RBC # FLD: 13.3 % — SIGNIFICANT CHANGE UP (ref 10.3–14.5)
RBC # FLD: 13.3 % — SIGNIFICANT CHANGE UP (ref 10.3–14.5)
RBC # FLD: 13.4 % — SIGNIFICANT CHANGE UP (ref 10.3–14.5)
RBC # FLD: 13.4 % — SIGNIFICANT CHANGE UP (ref 10.3–14.5)
RBC # FLD: 13.5 % — SIGNIFICANT CHANGE UP (ref 10.3–14.5)
RBC # FLD: 13.5 % — SIGNIFICANT CHANGE UP (ref 10.3–14.5)
RBC CASTS # UR COMP ASSIST: ABNORMAL /HPF
RBC CASTS # UR COMP ASSIST: ABNORMAL /HPF
SARS-COV-2 RNA SPEC QL NAA+PROBE: DETECTED
SARS-COV-2 RNA SPEC QL NAA+PROBE: DETECTED
SODIUM SERPL-SCNC: 139 MMOL/L — SIGNIFICANT CHANGE UP (ref 135–145)
SODIUM SERPL-SCNC: 139 MMOL/L — SIGNIFICANT CHANGE UP (ref 135–145)
SP GR SPEC: 1.01 — SIGNIFICANT CHANGE UP (ref 1–1.03)
SP GR SPEC: 1.01 — SIGNIFICANT CHANGE UP (ref 1–1.03)
TROPONIN I, HIGH SENSITIVITY RESULT: 86.8 NG/L — HIGH
TROPONIN I, HIGH SENSITIVITY RESULT: 86.8 NG/L — HIGH
UROBILINOGEN FLD QL: 0.2 MG/DL — SIGNIFICANT CHANGE UP (ref 0.2–1)
UROBILINOGEN FLD QL: 0.2 MG/DL — SIGNIFICANT CHANGE UP (ref 0.2–1)
WBC # BLD: 11.93 K/UL — HIGH (ref 3.8–10.5)
WBC # BLD: 11.97 K/UL — HIGH (ref 3.8–10.5)
WBC # BLD: 11.97 K/UL — HIGH (ref 3.8–10.5)
WBC # FLD AUTO: 11.93 K/UL — HIGH (ref 3.8–10.5)
WBC # FLD AUTO: 11.97 K/UL — HIGH (ref 3.8–10.5)
WBC # FLD AUTO: 11.97 K/UL — HIGH (ref 3.8–10.5)
WBC UR QL: 5 /HPF — SIGNIFICANT CHANGE UP (ref 0–5)
WBC UR QL: 5 /HPF — SIGNIFICANT CHANGE UP (ref 0–5)

## 2023-10-18 PROCEDURE — 93971 EXTREMITY STUDY: CPT | Mod: 26,RT

## 2023-10-18 PROCEDURE — 99285 EMERGENCY DEPT VISIT HI MDM: CPT

## 2023-10-18 PROCEDURE — 93010 ELECTROCARDIOGRAM REPORT: CPT | Mod: 77

## 2023-10-18 PROCEDURE — 99223 1ST HOSP IP/OBS HIGH 75: CPT

## 2023-10-18 PROCEDURE — 71250 CT THORAX DX C-: CPT | Mod: 26

## 2023-10-18 PROCEDURE — 93010 ELECTROCARDIOGRAM REPORT: CPT

## 2023-10-18 PROCEDURE — 71045 X-RAY EXAM CHEST 1 VIEW: CPT | Mod: 26

## 2023-10-18 RX ORDER — PIPERACILLIN AND TAZOBACTAM 4; .5 G/20ML; G/20ML
3.38 INJECTION, POWDER, LYOPHILIZED, FOR SOLUTION INTRAVENOUS EVERY 8 HOURS
Refills: 0 | Status: DISCONTINUED | OUTPATIENT
Start: 2023-10-18 | End: 2023-10-23

## 2023-10-18 RX ORDER — SODIUM CHLORIDE 9 MG/ML
1000 INJECTION INTRAMUSCULAR; INTRAVENOUS; SUBCUTANEOUS ONCE
Refills: 0 | Status: COMPLETED | OUTPATIENT
Start: 2023-10-18 | End: 2023-10-18

## 2023-10-18 RX ORDER — POTASSIUM CHLORIDE 20 MEQ
40 PACKET (EA) ORAL ONCE
Refills: 0 | Status: COMPLETED | OUTPATIENT
Start: 2023-10-18 | End: 2023-10-18

## 2023-10-18 RX ORDER — CEFTRIAXONE 500 MG/1
1000 INJECTION, POWDER, FOR SOLUTION INTRAMUSCULAR; INTRAVENOUS ONCE
Refills: 0 | Status: COMPLETED | OUTPATIENT
Start: 2023-10-18 | End: 2023-10-18

## 2023-10-18 RX ORDER — FINASTERIDE 5 MG/1
5 TABLET, FILM COATED ORAL DAILY
Refills: 0 | Status: DISCONTINUED | OUTPATIENT
Start: 2023-10-18 | End: 2023-10-23

## 2023-10-18 RX ORDER — PHYTONADIONE (VIT K1) 5 MG
5 TABLET ORAL ONCE
Refills: 0 | Status: COMPLETED | OUTPATIENT
Start: 2023-10-18 | End: 2023-10-18

## 2023-10-18 RX ORDER — AZITHROMYCIN 500 MG/1
500 TABLET, FILM COATED ORAL ONCE
Refills: 0 | Status: COMPLETED | OUTPATIENT
Start: 2023-10-18 | End: 2023-10-18

## 2023-10-18 RX ORDER — SODIUM CHLORIDE 9 MG/ML
1000 INJECTION, SOLUTION INTRAVENOUS
Refills: 0 | Status: DISCONTINUED | OUTPATIENT
Start: 2023-10-18 | End: 2023-10-22

## 2023-10-18 RX ORDER — TAMSULOSIN HYDROCHLORIDE 0.4 MG/1
0.4 CAPSULE ORAL AT BEDTIME
Refills: 0 | Status: DISCONTINUED | OUTPATIENT
Start: 2023-10-18 | End: 2023-10-23

## 2023-10-18 RX ORDER — PROTHROMBIN COMPLEX CONCENTRATE (HUMAN) 25.5; 16.5; 24; 22; 22; 26 [IU]/ML; [IU]/ML; [IU]/ML; [IU]/ML; [IU]/ML; [IU]/ML
1500 POWDER, FOR SOLUTION INTRAVENOUS ONCE
Refills: 0 | Status: DISCONTINUED | OUTPATIENT
Start: 2023-10-18 | End: 2023-10-18

## 2023-10-18 RX ORDER — POTASSIUM CHLORIDE 20 MEQ
10 PACKET (EA) ORAL ONCE
Refills: 0 | Status: COMPLETED | OUTPATIENT
Start: 2023-10-18 | End: 2023-10-18

## 2023-10-18 RX ADMIN — SODIUM CHLORIDE 1000 MILLILITER(S): 9 INJECTION INTRAMUSCULAR; INTRAVENOUS; SUBCUTANEOUS at 06:36

## 2023-10-18 RX ADMIN — PIPERACILLIN AND TAZOBACTAM 25 GRAM(S): 4; .5 INJECTION, POWDER, LYOPHILIZED, FOR SOLUTION INTRAVENOUS at 17:23

## 2023-10-18 RX ADMIN — PIPERACILLIN AND TAZOBACTAM 25 GRAM(S): 4; .5 INJECTION, POWDER, LYOPHILIZED, FOR SOLUTION INTRAVENOUS at 22:04

## 2023-10-18 RX ADMIN — Medication 100 MILLIEQUIVALENT(S): at 08:25

## 2023-10-18 RX ADMIN — CEFTRIAXONE 100 MILLIGRAM(S): 500 INJECTION, POWDER, FOR SOLUTION INTRAMUSCULAR; INTRAVENOUS at 08:07

## 2023-10-18 RX ADMIN — TAMSULOSIN HYDROCHLORIDE 0.4 MILLIGRAM(S): 0.4 CAPSULE ORAL at 22:04

## 2023-10-18 RX ADMIN — Medication 5 MILLIGRAM(S): at 08:21

## 2023-10-18 RX ADMIN — SODIUM CHLORIDE 50 MILLILITER(S): 9 INJECTION, SOLUTION INTRAVENOUS at 17:12

## 2023-10-18 RX ADMIN — Medication 40 MILLIEQUIVALENT(S): at 08:07

## 2023-10-18 RX ADMIN — AZITHROMYCIN 255 MILLIGRAM(S): 500 TABLET, FILM COATED ORAL at 08:25

## 2023-10-18 NOTE — H&P ADULT - NSHPLABSRESULTS_GEN_ALL_CORE
LABS:                        11.3   11.93 )-----------( 318      ( 18 Oct 2023 12:17 )             33.4     10-18    139  |  102  |  44<H>  ----------------------------<  96  3.1<L>   |  25  |  1.60<H>    Ca    8.5      18 Oct 2023 06:25    TPro  6.9  /  Alb  2.9<L>  /  TBili  1.1  /  DBili  x   /  AST  29  /  ALT  22  /  AlkPhos  79  10-18    PT/INR - ( 18 Oct 2023 07:55 )   PT: 96.3 sec;   INR: 8.77 ratio         PTT - ( 18 Oct 2023 06:25 )  PTT:59.4 sec  CAPILLARY BLOOD GLUCOSE            Urinalysis Basic - ( 18 Oct 2023 06:25 )    Color: x / Appearance: x / SG: x / pH: x  Gluc: 96 mg/dL / Ketone: x  / Bili: x / Urobili: x   Blood: x / Protein: x / Nitrite: x   Leuk Esterase: x / RBC: x / WBC x   Sq Epi: x / Non Sq Epi: x / Bacteria: x        RADIOLOGY & ADDITIONAL TESTS:

## 2023-10-18 NOTE — CONSULT NOTE ADULT - ASSESSMENT
BERNADETTE: Prerenal azotemia, Urinary retention  Urinary retention  Hypokalemia  COVID 19+  Supratherapeutic INR    Gentle IV hydration. Will follow creatinine trend post palomino placement. Strict I & O. Potassium supplementation.   Avoid nephrotoxic meds as possible. Will follow electrolytes and renal function trend. Encourage PO intake as tolerated.   On flomax.  follow up.     Further recommendations pending clinical course. Thank you for the courtesy of this referral.

## 2023-10-18 NOTE — ED PROVIDER NOTE - CONSTITUTIONAL, MLM
normal... Frail, cachectic, awake, alert, oriented to person, place, time/situation and in no apparent distress.

## 2023-10-18 NOTE — H&P ADULT - HISTORY OF PRESENT ILLNESS
89-year-old male with a history of A-fib, HTN, macular degeneration presents with weakness, fatigue, dehydration, decreased p.o. intake, decreased urine output.  History provided by patient and daughter.  Patient lives with his wife.  States that he was diagnosed with COVID approximately 10 days ago and ever since then he has had a very poor appetite, dry cough, and has felt very weak.  He saw his primary care doctor last week and was told that his symptoms had been persistent for too long and therefore was not able to take Paxlovid.  His primary care doctor was concerned that patient may have pneumonia and started him on Keflex.  Patient took Keflex for 2 days and then developed several episodes of diarrhea.  He stopped taking the Keflex and his diarrhea resolved.  Daughter states that patient has an aide for 12 hours a day 7 days a week.  In ED- palomino placement was tried but unsuccessful dt resistance and active bleeding

## 2023-10-18 NOTE — H&P ADULT - NSHPREVIEWOFSYSTEMS_GEN_ALL_CORE
REVIEW OF SYSTEMS:    CONSTITUTIONAL: + weakness, no fevers or chills  EYES/ENT: No visual changes;  No vertigo or throat pain   NECK: No pain or stiffness  RESPIRATORY: + cough, wheezing, hemoptysis; No shortness of breath  CARDIOVASCULAR: No chest pain or palpitations  GASTROINTESTINAL: No abdominal or epigastric pain. No nausea, vomiting, or hematemesis; No diarrhea or constipation. No melena or hematochezia.  GENITOURINARY: No dysuria, frequency or hematuria  NEUROLOGICAL: No numbness or weakness  SKIN: No itching, rashes

## 2023-10-18 NOTE — CONSULT NOTE ADULT - ASSESSMENT
Assessment/Plan: 89 y/o M with Afib (on Coumadin) and HTN presented to the ED with FTT, weakness, fatigue, dehydration, and decreased urine output with recent Dx of Covid (10 days ago), found to have supratherapeutic INR (~8), BERNADETTE, and mildly elevated troponin of 76.    Afib (on Coumadin)  - Presented with supratherapeutic INR (~8), likely, in the setting of poor PO intake.  - Hold Coumadin for now.  Would resume when INR = 2  - Okay to reverse INR if needed  - EKG showed Afib with SVR, 58. with RBBB (chronic).  Tele showing rates of 40's.  BP's stable  - Hold home AVN blockers for now    Elevated Troponin  - Trivial troponin,  likely demand in the setting of dehydration, infection, among other things  - Continue to trend till peaks  - EKG showed  - TTE in 2021 showed normal LVSF, EF 55%, severe LAE, and mod LVH.  Can repeat routinely    - No evidence of volume overload  - No O2 requirement and satting 97%  - Initiate incentive spirometer  - Follow ID recs    - Monitor electrolytes and replete to keep K>4 and Mag>2  - Will continue to follow    Estela Albrecht DNP, NP-C, AGACNP-C  Cardiology  Call TEAMS           Assessment/Plan: 89 y/o M with Afib (on Coumadin) and HTN presented to the ED with FTT, weakness, fatigue, dehydration, and decreased urine output with recent Dx of Covid (10 days ago), found to have supratherapeutic INR (~8), BERNADETTE, and mildly elevated troponin of 76.    Afib (on Coumadin)  - Presented with supratherapeutic INR (~8), likely, in the setting of poor PO intake.  - Hold Coumadin for now.  Would resume when INR = 2  - Okay to reverse INR if needed but so far no sings of active bleeding   - EKG showed Afib with SVR, 58. with RBBB (chronic).  Tele showing rates of 40's.  BP's stable  - Hold AVN blockers for now  - monitor tele     Elevated Troponin  - Trivial troponin,  likely demand in the setting of dehydration, infection, among other things  - Continue to trend till peaks  - EKG without sig ischemic changes. baseline RBBB  - TTE in 2021 showed normal LVSF, EF 55%, severe LAE, and mod LVH.  Can repeat routinely    - No evidence of volume overload  - No O2 requirement and satting 97%  - Initiate incentive spirometer  - Follow ID recs    - Monitor electrolytes and replete to keep K>4 and Mag>2  - Will continue to follow    Estela Albrecht DNP, NP-C, AGACNP-C  Cardiology  Call TEAMS

## 2023-10-18 NOTE — CONSULT NOTE ADULT - SUBJECTIVE AND OBJECTIVE BOX
Patient is a 89y old  Male who presents with a chief complaint of PNA (18 Oct 2023 13:46)    HPI:  89-year-old male with a history of A-fib, HTN, macular degeneration presents with weakness, fatigue, dehydration, decreased p.o. intake, decreased urine output.  History provided by patient and daughter.  Patient lives with his wife.  States that he was diagnosed with COVID approximately 10 days ago and ever since then he has had a very poor appetite, dry cough, and has felt very weak.  He saw his primary care doctor last week and was told that his symptoms had been persistent for too long and therefore was not able to take Paxlovid.  His primary care doctor was concerned that patient may have pneumonia and started him on Keflex.  Patient took Keflex for 2 days and then developed several episodes of diarrhea.  He stopped taking the Keflex and his diarrhea resolved.  Daughter states that patient has an aide for 12 hours a day 7 days a week.  In ED- palomino placement was tried but unsuccessful dt resistance and active bleeding (18 Oct 2023 12:27)    Renal consult called for BERNADETTE. History obtained from chart and patient.       PAST MEDICAL HISTORY:  HTN (hypertension)    Atrial fibrillation    Degeneration macular        PAST SURGICAL HISTORY:      FAMILY HISTORY:      SOCIAL HISTORY: No smoking or alcohol use     Allergies    No Known Allergies    Intolerances      Home Medications:  brinzolamide 1% ophthalmic suspension: 1 drop(s) in each eye once a day (18 Oct 2023 15:12)  cephalexin 500 mg oral capsule: 1 cap(s) orally 2 times a day for 7 days ends 10/21/23 (18 Oct 2023 15:12)  Coumadin 7.5 mg oral tablet: 1 tab(s) orally once a day (at bedtime) (18 Oct 2023 15:12)  furosemide 40 mg oral tablet: 1 tab(s) orally once a day as needed (18 Oct 2023 15:12)  hydrALAZINE 25 mg oral tablet: 1 tab(s) orally once a day (18 Oct 2023 15:12)  hydroCHLOROthiazide 25 mg oral tablet: 1 tab(s) orally once a day (18 Oct 2023 15:12)  latanoprost 0.005% ophthalmic solution: 1 drop(s) in each eye once a day (18 Oct 2023 15:12)  losartan 100 mg oral tablet: 1 tab(s) orally once a day (18 Oct 2023 15:12)  Norvasc 10 mg oral tablet: 1 tab(s) orally once a day (18 Oct 2023 15:12)    MEDICATIONS  (STANDING):  finasteride 5 milliGRAM(s) Oral daily  piperacillin/tazobactam IVPB.. 3.375 Gram(s) IV Intermittent every 8 hours  tamsulosin 0.4 milliGRAM(s) Oral at bedtime    MEDICATIONS  (PRN):      REVIEW OF SYSTEMS:  General: no distress  Respiratory: No cough, SOB  Cardiovascular: No CP or Palpitations	  Gastrointestinal: No nausea, Vomiting. No diarrhea  Genitourinary: Palomino placed in ER for urinary retention	  Musculoskeletal: No leg swelling, No new rash or lesions	  all other systems negative    T(F): 98 (10-18-23 @ 12:14), Max: 98.1 (10-18-23 @ 08:18)  HR: 56 (10-18-23 @ 12:14) (50 - 110)  BP: 135/57 (10-18-23 @ 12:14) (108/74 - 167/55)  RR: 17 (10-18-23 @ 12:14) (17 - 18)  SpO2: 96% (10-18-23 @ 12:14) (96% - 98%)  Wt(kg): --    PHYSICAL EXAM:  General: NAD  Respiratory: b/l air entry  Cardiovascular: S1 S2  Gastrointestinal: soft  Extremities: no edema        10-18    139  |  102  |  44<H>  ----------------------------<  96  3.1<L>   |  25  |  1.60<H>    Ca    8.5      18 Oct 2023 06:25    TPro  6.9  /  Alb  2.9<L>  /  TBili  1.1  /  DBili  x   /  AST  29  /  ALT  22  /  AlkPhos  79  10-18                          11.3   11.93 )-----------( 318      ( 18 Oct 2023 12:17 )             33.4       Hemoglobin: 11.3 g/dL (10-18 @ 12:17)  Hematocrit: 33.4 % (10-18 @ 12:17)  Hemoglobin: 12.0 g/dL (10-18 @ 07:55)  Hematocrit: 35.3 % (10-18 @ 07:55)      Creatinine, Serum: 1.60 (10-18 @ 06:25)      Urinalysis Basic - ( 18 Oct 2023 06:25 )    Color: x / Appearance: x / SG: x / pH: x  Gluc: 96 mg/dL / Ketone: x  / Bili: x / Urobili: x   Blood: x / Protein: x / Nitrite: x   Leuk Esterase: x / RBC: x / WBC x   Sq Epi: x / Non Sq Epi: x / Bacteria: x      LIVER FUNCTIONS - ( 18 Oct 2023 06:25 )  Alb: 2.9 g/dL / Pro: 6.9 g/dL / ALK PHOS: 79 U/L / ALT: 22 U/L / AST: 29 U/L / GGT: x                     < from: Xray Chest 1 View-PORTABLE IMMEDIATE (10.18.23 @ 06:55) >    ACC: [id number] EXAM:  XR CHEST PORTABLE IMMED 1V   ORDERED BY: JEAN MORAES     PROCEDURE DATE:  10/18/2023          INTERPRETATION:  AP chest on October 18, 2023 at 6:51 AM. Patient has   sepsis.    COMPARISON: None available.    Heart is enlarged.    There is scattered calcified pleural plaques.    Lungs are clear.    IMPRESSION: Heart enlargement and scattered calcified pleural plaques.    --- End of Report ---            CARLOS ASHBY MD; Attending Radiologist  This document has been electronically signed. Oct 18 2023  1:02PM    < end of copied text >

## 2023-10-18 NOTE — H&P ADULT - NSHPPHYSICALEXAM_GEN_ALL_CORE
Vitals last 24 hrs  T(C): 36.7 (10-18-23 @ 12:14), Max: 36.7 (10-18-23 @ 06:06)  HR: 56 (10-18-23 @ 12:14) (50 - 110)  BP: 135/57 (10-18-23 @ 12:14) (108/74 - 167/55)  RR: 17 (10-18-23 @ 12:14) (17 - 18)  SpO2: 96% (10-18-23 @ 12:14) (96% - 98%)    PHYSICAL EXAM:  GENERAL: NAD, well-groomed, well-developed  HEAD:  Atraumatic, Normocephalic  EYES: EOMI, PERRLA, conjunctiva and sclera clear  ENMT: No tonsillar erythema, exudates, or enlargement; Moist mucous membranes  NECK: Supple, No JVD, Normal thyroid  HEART: irregular rate and rhythm; + murmurs, rubs, or gallops  RESPIRATORY: CTA B/L, No W/R/R  ABDOMEN: Soft, Nontender, Nondistended; Bowel sounds present  NEUROLOGY: A&Ox3, nonfocal, moving all extremities  EXTREMITIES:  2+ Peripheral Pulses, No clubbing, cyanosis, or edema  SKIN: warm, dry, normal color, no rash or abnormal lesions

## 2023-10-18 NOTE — ED ADULT NURSE REASSESSMENT NOTE - NS ED NURSE REASSESS COMMENT FT1
0900  called and spoke to admitting MD, Dr. Guajardo.  informed her of the incidence of blood from Craig and inability to gain access to bladder.  (Craig was inserted all the way ,and the balloon was never inflated.  about 150ml's of blood removed, Craig removed, some slight, minimal bleeding remains from tip of penis.  per MD, call uro on call as this is a STAT situation.  No (uro) paged at [phone],  saba, rn

## 2023-10-18 NOTE — ED ADULT NURSE NOTE - CAS EDP DISCH DISPOSITION ADMI
Retention Suture Text: Retention sutures were placed to support the closure and prevent dehiscence. 314D/Telemetry

## 2023-10-18 NOTE — CONSULT NOTE ADULT - SUBJECTIVE AND OBJECTIVE BOX
[facility], Division of Infectious Diseases  JIAN Carrizales S. Shah, Y. Patel, [facility]  [phone]    GURINDER TURNER  89y, Male  [id number]    HPI--  HPI:  89-year-old male with a history of A-fib, HTN, macular degeneration presents with weakness, fatigue, dehydration, decreased p.o. intake, decreased urine output.  History provided by patient and daughter.  Patient lives with his wife.  States that he was diagnosed with COVID approximately 10 days ago and ever since then he has had a very poor appetite, dry cough, and has felt very weak.  He saw his primary care doctor last week and was told that his symptoms had been persistent for too long and therefore was not able to take Paxlovid.  His primary care doctor was concerned that patient may have pneumonia and started him on Keflex.  Patient took Keflex for 2 days and then developed several episodes of diarrhea.  He stopped taking the Keflex and his diarrhea resolved.  Daughter states that patient has an aide for 12 hours a day 7 days a week.  In ED- palomino placement was tried but unsuccessful dt resistance and active bleeding (18 Oct 2023 12:27)    Pt seen at bedside  Daughter at bedside  Reporting that he is having RLE pain    Active Medications--  piperacillin/tazobactam IVPB.. 3.375 Gram(s) IV Intermittent every 8 hours    Antimicrobials:   piperacillin/tazobactam IVPB.. 3.375 Gram(s) IV Intermittent every 8 hours    Immunologic:     ROS:  CONSTITUTIONAL: No fevers or chills. No weakness or headache. No weight changes.  EYES/ENT: No visual or hearing changes. No sore throat or throat pain .  NECK: No pain or stiffness  RESPIRATORY: No cough, wheezing, or hemoptysis. No shortness of breath  CARDIOVASCULAR: No chest pain or palpitations  GASTROINTESTINAL: No abdominal pain. No nausea or vomiting. No diarrhea or constipation.  GENITOURINARY: No dysuria, frequency or hematuria  NEUROLOGICAL: No numbness or weakness  SKIN: No itching or rashes  PSYCHIATRIC: Pleasant. Appropriate affect    Allergies: No Known Allergies    PMH -- HTN (hypertension)    Atrial fibrillation    Degeneration macular      PSH --   FH --   Social History --  EtOH: denies   Tobacco: denies   Drug Use: denies     Travel/Environmental/Occupational History:    Physical Exam--  Vital Signs Last 24 Hrs  T(F): 98 (18 Oct 2023 12:14), Max: 98.1 (18 Oct 2023 08:18)  HR: 56 (18 Oct 2023 12:14) (50 - 110)  BP: 135/57 (18 Oct 2023 12:14) (108/74 - 167/55)  RR: 17 (18 Oct 2023 12:14) (17 - 18)  SpO2: 96% (18 Oct 2023 12:14) (96% - 98%)  General: nontoxic-appearing, no acute distress  HEENT: NC/AT, EOMI,   Lungs: Clear bilaterally without rales, wheezing or rhonchi  Heart: Regular rate and rhythm. No murmur, rub or gallop.  Abdomen: Soft. Nondistended. Nontender.  Extremities: No cyanosis or clubbing. No edema.   Skin: Warm. Dry. Good turgor.     Laboratory & Imaging Data:  CBC:                       11.3   11.93 )-----------( 318      ( 18 Oct 2023 12:17 )             33.4     CMP: 10-18    139  |  102  |  44<H>  ----------------------------<  96  3.1<L>   |  25  |  1.60<H>    Ca    8.5      18 Oct 2023 06:25    TPro  6.9  /  Alb  2.9<L>  /  TBili  1.1  /  DBili  x   /  AST  29  /  ALT  22  /  AlkPhos  79  10-18    LIVER FUNCTIONS - ( 18 Oct 2023 06:25 )  Alb: 2.9 g/dL / Pro: 6.9 g/dL / ALK PHOS: 79 U/L / ALT: 22 U/L / AST: 29 U/L / GGT: x           Urinalysis Basic - ( 18 Oct 2023 06:25 )    Color: x / Appearance: x / SG: x / pH: x  Gluc: 96 mg/dL / Ketone: x  / Bili: x / Urobili: x   Blood: x / Protein: x / Nitrite: x   Leuk Esterase: x / RBC: x / WBC x   Sq Epi: x / Non Sq Epi: x / Bacteria: x        Microbiology: reviewed        Radiology: reviewed  < from: Xray Chest 1 View-PORTABLE IMMEDIATE (10.18.23 @ 06:55) >    ACC: [id number] EXAM:  XR CHEST PORTABLE IMMED 1V   ORDERED BY: JEAN MORAES     PROCEDURE DATE:  10/18/2023          INTERPRETATION:  AP chest on October 18, 2023 at 6:51 AM. Patient has   sepsis.    COMPARISON: None available.    Heart is enlarged.    There is scattered calcified pleural plaques.    Lungs are clear.    IMPRESSION: Heart enlargement and scattered calcified pleural plaques.    --- End of Report ---            CARLOS ASHBY MD; Attending Radiologist  This document has been electronically signed. Oct 18 2023  1:02PM    < end of copied text >

## 2023-10-18 NOTE — ED ADULT NURSE REASSESSMENT NOTE - NSFALLUNIVINTERV_ED_ALL_ED
Bed/Stretcher in lowest position, wheels locked, appropriate side rails in place/Call bell, personal items and telephone in reach/Instruct patient to call for assistance before getting out of bed/chair/stretcher/Non-slip footwear applied when patient is off stretcher/Mooers to call system/Physically safe environment - no spills, clutter or unnecessary equipment/Purposeful proactive rounding/Room/bathroom lighting operational, light cord in reach

## 2023-10-18 NOTE — ED PROVIDER NOTE - CARE PLAN
Principal Discharge DX:	RLL pneumonia  Secondary Diagnosis:	Hypokalemia  Secondary Diagnosis:	Elevated INR  Secondary Diagnosis:	Adult failure to thrive   1

## 2023-10-18 NOTE — PATIENT PROFILE ADULT - FALL HARM RISK - FACTORS
Impaired gait/Impaired vision/IV and/or equipment tethered to patient/Other medical problems/Poor balance

## 2023-10-18 NOTE — CHART NOTE - NSCHARTNOTEFT_GEN_A_CORE
Called by RN, patient HR was low, 30s. Confirmed with tele tech, patient's HR, range was lowest at 39, nonsustained. Patient asymptomatic, will be going up to floors on telemetry.     T(F): 98.7 (10-18-23 @ 22:06), Max: 98.7 (10-18-23 @ 22:06)  HR: 58 (10-18-23 @ 22:06) (38 - 61)  BP: 138/58 (10-18-23 @ 22:06) (135/57 - 158/67)  RR: 17 (10-18-23 @ 22:06) (16 - 17)  SpO2: 98% (10-18-23 @ 22:06) (96% - 98%)     Assessment/Plan: 89 y/o M with Afib (on Coumadin) and HTN presented to the ED with FTT, weakness, fatigue, dehydration, and decreased urine output with recent Dx of Covid (10 days ago), found to have supratherapeutic INR (~8), BERNADETTE, and mildly elevated troponin of 76.  - Previous EKG showed Afib with SVR, 48. with RBBB (chronic), LVH  - Per tele tech, patient HR now in 40s-50s.   - EKG ordered to assess to potential new heart block  - RN to call if any changes    _____________________________________________________________________  ADDENDUM  - Repeat EKG reviewed, no new heart block  - EKG: Afib with SVR, rate 54 with known RBBB  - RN to call if any changes

## 2023-10-18 NOTE — CONSULT NOTE ADULT - ASSESSMENT
89-year-old male with a history of A-fib, HTN, macular degeneration presents with weakness, fatigue, dehydration, decreased p.o. intake, decreased urine output.   Recent COVID infection. Reporting that he is having RLE pain    C/f continued infection and FTT  CXR clear lungs, however pt reporting URI sx  S/p ceftriaxone/azithromycin in the ED  Leukocytosis to 11    Recommendations  Start on zosyn for now  CT chest  RLE DVT study  F/u pending cultures  Trend temps/WBC  Additional care per primary team      D/w Dr. Guajardo  Infectious Diseases will continue to follow. Please call with any questions.   Denise Sarabia M.D.  [facility] Division of Infectious Diseases [phone]  For after 5 P.M. and weekends, please call [phone]

## 2023-10-18 NOTE — ED PROVIDER NOTE - CLINICAL SUMMARY MEDICAL DECISION MAKING FREE TEXT BOX
89 year old male p/w weakness, dehydration, fatigue, cough, decreased PO intake x 10 days.  He was diagnosed with COVID 10 days ago and has felt poorly every since.  Was started on Keflex for presumed PNA but developed diarrhea and d/c'd it after 2 days.  Frail with dry mucous membranes on exam.  Check labs, lactate, cultures UA, CXR, RVP, EKG, hydrate, abx as needed, admit

## 2023-10-18 NOTE — ED ADULT NURSE REASSESSMENT NOTE - NS ED NURSE REASSESS COMMENT FT1
0900: Spoke with MD Hall about UA, wanted bladder scan, scan showed 1287 mL, palomino ordered, when placing palomino hit resistance, blood appeared in palomino tubing and bag. MD aware. Pending urology consult. pt remains stable, pt on continuous cardiac monitoring. 0900: Spoke with MD Hall about UA, wanted bladder scan, scan showed 1287 mL, palomino ordered, when placing palomino hit resistance, blood appeared in palomino tubing and bag. MD aware. Pending urology consult. pt remains stable, pt on continuous cardiac monitoring.    0934: spoke with MD, informed of blood concerns as per MD Guajardo. MD states will review chart

## 2023-10-18 NOTE — ED ADULT NURSE REASSESSMENT NOTE - NS ED NURSE REASSESS COMMENT FT1
Received Pt a/ox4, [place], guerline afib on CM.  Craig draining dark, no active bleeding, UA/CNS sent.  No other signs of acute distress noted.

## 2023-10-18 NOTE — ED ADULT NURSE NOTE - OBJECTIVE STATEMENT
Brought in by EMS patient reports was diagnosed with Covid 10 days ago came today c/o generalized weakness/pain, having poor appetite and on and off cough. Patient denies fever/ chills/ urinary symptoms. Patient received with IV cannula on the left AC g20 by EMS.

## 2023-10-18 NOTE — ED ADULT NURSE REASSESSMENT NOTE - NS ED NURSE REASSESS COMMENT FT1
1145:  Dr. Robb came in, wanted to know why urology had not come in yet.  It was explained to her that we called Dr. Saldivar at 0934 and he informed us that he was going to read the chart.  It was told to him that we are NOT going to place another palomino.  Dr. England called and spoke to him and he responded that he will come in a few hours to asses, but thought that the RN's were going to place another palomino.  I explained to her that we are not placing one at this time.  The patient is not in any distress, but confirmed with her that he has an extensive amount of urine in bladder and this is concerning.  some blood continues to 'ooze" from penis, no obvious urine draining  will perform another bladder scanner shortly.  feli walter

## 2023-10-18 NOTE — ED ADULT NURSE REASSESSMENT NOTE - NS ED NURSE REASSESS COMMENT FT1
1100  still awaiting urology to evaluate the patient.  daughter at bedside and she was given an update regarding situation,  feli walter

## 2023-10-18 NOTE — CONSULT NOTE ADULT - NS ATTEND AMEND GEN_ALL_CORE FT
89 y/o M with Afib (on Coumadin) and HTN presented to the ED with FTT, weakness, fatigue, dehydration, and decreased urine output with recent Dx of Covid (10 days ago), found to have supratherapeutic INR (~8), BERNADETTE, and mildly elevated troponin of 76.    Afib (on Coumadin)  - Presented with supratherapeutic INR (~8), likely, in the setting of poor PO intake.  - Hold Coumadin for now.  Would resume when INR = 2  - Okay to reverse INR if needed  - EKG showed Afib with SVR, 58. with RBBB (chronic).  Tele showing rates of 40's.  BP's stable  - Hold home AVN blockers for now    Elevated Troponin  - Trivial troponin,  likely demand in the setting of dehydration, infection, among other things  - Continue to trend till peaks  - ekg nonischemic and unchanges.   - TTE in 2021 showed normal LVSF, EF 55%, severe LAE, and mod LVH.  Can repeat routinely    - Appears compensated from HF POV. Appears dry IVF  - ID eval for infeciton  - Nutrional support  - Please continue to maintain strict I/Os, monitor daily weights, Cr, and K.   - Monitor and replete electrolytes. Keep K>4.0 and Mg>2.0.   - Further cardiac workup will depend on clinical course.

## 2023-10-18 NOTE — CONSULT NOTE ADULT - SUBJECTIVE AND OBJECTIVE BOX
HISTORY OF PRESENT ILLNESS:  89 y/o male BIBA from home, diagnosed with COVID-19 ten days ago, had gross hematuria when ER staff attempted to place palomino and met resistance.  Pt on Coumadin for A-fib; INR found to be >8.   called for palomino placement, as bladder scan indicated >1200cc.  Pt's daughter at bedside was able to corroborate the history provided to me by pt.  Pt has been relatively mobile/independent until about 2 years ago, when he had his hip surgery.  Pt has been urinating without difficulty until several days ago, when he had difficulty urinating.  No h/o gross hematuria.    PAST MEDICAL & SURGICAL HISTORY:  HTN (hypertension)      Atrial fibrillation      Degeneration macular      All other review of systems is negative unless indicated above.    MEDICATIONS  (STANDING):  piperacillin/tazobactam IVPB.. 3.375 Gram(s) IV Intermittent every 8 hours    MEDICATIONS  (PRN):      Allergies    No Known Allergies    SOCIAL HISTORY:     Worked for [employer], manager      FAMILY HISTORY:      Vital Signs Last 24 Hrs  T(C): 36.7 (18 Oct 2023 12:14), Max: 36.7 (18 Oct 2023 06:06)  T(F): 98 (18 Oct 2023 12:14), Max: 98.1 (18 Oct 2023 08:18)  HR: 56 (18 Oct 2023 12:14) (50 - 110)  BP: 135/57 (18 Oct 2023 12:14) (108/74 - 167/55)  BP(mean): --  RR: 17 (18 Oct 2023 12:14) (17 - 18)  SpO2: 96% (18 Oct 2023 12:14) (96% - 98%)    Parameters below as of 18 Oct 2023 12:14  Patient On (Oxygen Delivery Method): room air        PHYSICAL EXAM:    Constitutional: NAD, thin, able to converse coherently  Mildly distended SP area, NT  Normal phallus, with blood per urethra    LABS:                        11.3   11.93 )-----------( 318      ( 18 Oct 2023 12:17 )             33.4     10-18    139  |  102  |  44<H>  ----------------------------<  96  3.1<L>   |  25  |  1.60<H>    Ca    8.5      18 Oct 2023 06:25    TPro  6.9  /  Alb  2.9<L>  /  TBili  1.1  /  DBili  x   /  AST  29  /  ALT  22  /  AlkPhos  79  10-18    PT/INR - ( 18 Oct 2023 07:55 )   PT: 96.3 sec;   INR: 8.77 ratio         PTT - ( 18 Oct 2023 06:25 )  PTT:59.4 sec  Urinalysis Basic - ( 18 Oct 2023 06:25 )    Color: x / Appearance: x / SG: x / pH: x  Gluc: 96 mg/dL / Ketone: x  / Bili: x / Urobili: x   Blood: x / Protein: x / Nitrite: x   Leuk Esterase: x / RBC: x / WBC x   Sq Epi: x / Non Sq Epi: x / Bacteria: x      Urine Culture:       RADIOLOGY & ADDITIONAL STUDIES:  < from: Xray Chest 1 View-PORTABLE IMMEDIATE (10.18.23 @ 06:55) >    ACC: [id number] EXAM:  XR CHEST PORTABLE IMMED 1V   ORDERED BY: JEAN MORAES     PROCEDURE DATE:  10/18/2023          INTERPRETATION:  AP chest on October 18, 2023 at 6:51 AM. Patient has   sepsis.    COMPARISON: None available.    Heart is enlarged.    There is scattered calcified pleural plaques.    Lungs are clear.    IMPRESSION: Heart enlargement and scattered calcified pleural plaques.    --- End of Report ---            CARLOS ASHBY MD; Attending Radiologist

## 2023-10-18 NOTE — ED ADULT NURSE NOTE - FINAL NURSING ELECTRONIC SIGNATURE
I tried calling the patient regarding concerns about having to be called back regarding lab work or needed to come in for lab results.  Unable to leave a voicemail message to give us a call back so I am asking my staff to please call and let patient know that I will be calling back as it is all the labs are completed.  The patient needs to be put on the schedule for follow-up visit in 1 month.  I asked  to please do that.   18-Oct-2023 21:06

## 2023-10-18 NOTE — ED PROVIDER NOTE - DIFFERENTIAL DIAGNOSIS
Ddx includes but not limited to dehydration, PNA, COVID, electrolyte abnormality, UTI, viral illness, URI Differential Diagnosis

## 2023-10-18 NOTE — CONSULT NOTE ADULT - SUBJECTIVE AND OBJECTIVE BOX
[facility] - Cayla Orta Pannella, Patel, Savella, Cohen Goodger  Office Number: [phone]    Initial Consult Note    CHIEF COMPLAINT: Patient is a 89y old  Male who presents with a chief complaint of     HPI: This is a 89 y/o M with Afib (on Coumadin) and HTN presented to the ED with FTT, weakness, fatigue, dehydration, and decreased urine output with recent Dx of Covid (10 days ago), found to have supratherapeutic INR (~8), BERNADETTE, and mildly elevated troponin of 76.    PAST MEDICAL & SURGICAL HISTORY:  HTN (hypertension)  Atrial fibrillation  Degeneration macular    SOCIAL HISTORY:  No tobacco, ethanol, or drug abuse.  FAMILY HISTORY:    No family history of acute MI or sudden cardiac death.  MEDICATIONS  (STANDING):    MEDICATIONS  (PRN):    Allergies    No Known Allergies    Intolerances    REVIEW OF SYSTEMS:  CONSTITUTIONAL: No weakness, fevers or chills  EYES/ENT: No visual changes;  No vertigo or throat pain   NECK: No pain or stiffness  RESPIRATORY: No cough, wheezing, hemoptysis; No shortness of breath  CARDIOVASCULAR: No chest pain or palpitations  GASTROINTESTINAL: No abdominal pain. No nausea, vomiting, or hematemesis; No diarrhea or constipation. No melena or hematochezia.  GENITOURINARY: No dysuria, frequency or hematuria  NEUROLOGICAL: No numbness or weakness  SKIN: No itching or rash  All other review of systems is negative unless indicated above  VITAL SIGNS:   Vital Signs Last 24 Hrs  T(C): 36.7 (18 Oct 2023 08:18), Max: 36.7 (18 Oct 2023 06:06)  T(F): 98.1 (18 Oct 2023 08:18), Max: 98.1 (18 Oct 2023 08:18)  HR: 110 (18 Oct 2023 08:18) (50 - 110)  BP: 108/74 (18 Oct 2023 08:18) (108/74 - 167/55)  BP(mean): --  RR: 18 (18 Oct 2023 08:18) (18 - 18)  SpO2: 98% (18 Oct 2023 08:18) (98% - 98%)    Parameters below as of 18 Oct 2023 08:18  Patient On (Oxygen Delivery Method): room air    I&O's Summary    On Exam:  Constitutional: NAD, alert and oriented x 3  Lungs:  Non-labored, breath sounds are clear bilaterally, No wheezing, rales or rhonchi  Cardiovascular: RRR.  S1 and S2 positive.  No murmurs, rubs, gallops or clicks  Gastrointestinal: Bowel Sounds present, soft, nontender.   Lymph: No peripheral edema. No cervical lymphadenopathy.  Neurological: Alert, no focal deficits  Skin: No rashes or ulcers   Psych:  Mood & affect appropriate.    LABS: All Labs Reviewed:                        12.0   11.97 )-----------( 329      ( 18 Oct 2023 07:55 )             35.3                         12.4   11.93 )-----------( 365      ( 18 Oct 2023 06:25 )             37.0     18 Oct 2023 06:25    139    |  102    |  44     ----------------------------<  96     3.1     |  25     |  1.60     Ca    8.5        18 Oct 2023 06:25    TPro  6.9    /  Alb  2.9    /  TBili  1.1    /  DBili  x      /  AST  29     /  ALT  22     /  AlkPhos  79     18 Oct 2023 06:25    PT/INR - ( 18 Oct 2023 07:55 )   PT: 96.3 sec;   INR: 8.77 ratio      PTT - ( 18 Oct 2023 06:25 )  PTT:59.4 sec    RADIOLOGY:    EKG:    IN PROGRESS  Assessment/Plan: 89 y/o M with Afib (on Coumadin) and HTN presented to the ED with FTT, weakness, fatigue, dehydration, and decreased urine output with recent Dx of Covid (10 days ago), found to have supratherapeutic INR (~8), BERNADETTE, and mildly elevated troponin of 76.    Afib (on Coumadin)  - Presented with supratherapeutic INR (~8), likely, in the setting of poor PO intake.  - Hold Coumadin for now.  Would resume when INR = 2  - Okay to reverse INR if needed  -     Elevated Troponin  - Trivial troponin,  likely demand in the setting of dehydration, infection, among other things  - Continue to trend till peaks  - EKG showed  - Obtain TTE    -     Estela Albrecht DNP, NP-C, AGACNP-C  Cardiology  Call TEAMS       [facility] - Cayla Orta Pannella, Patel, Savella, Cohen Lokeshantoinette  Office Number: [phone]    Initial Consult Note:  This is a 89 y/o M with Afib (on Coumadin) and HTN presented to the ED with FTT, weakness, fatigue, dehydration, and decreased urine output with recent Dx of Covid (10 days ago), found to have supratherapeutic INR (~8), BERNADETTE, and mildly elevated troponin of 76.    CHIEF COMPLAINT: Patient is a 89y old  Male who presents with a chief complaint of     HPI: This is a 89 y/o M with Afib (on Coumadin) and HTN presented to the ED with FTT, weakness, fatigue, dehydration, and decreased urine output with recent Dx of Covid (10 days ago), found to have supratherapeutic INR (~8), BERNADETTE, and mildly elevated troponin of 76.  per daughter, patient has been coughing productively with SOB since he was diagnosed with COVId from a home kit.  She called patient's PCP who was concerned that he was having a Pna and prescribed him Keflex on Satruday.  Patientr then started having diarrhea.  Denies CP or palpitations.    PAST MEDICAL & SURGICAL HISTORY:  HTN (hypertension)  Atrial fibrillation  Degeneration macular    SOCIAL HISTORY:  No tobacco, ethanol, or drug abuse.  FAMILY HISTORY:    No family history of acute MI or sudden cardiac death.  MEDICATIONS  (STANDING):    MEDICATIONS  (PRN):    Allergies    No Known Allergies    Intolerances    REVIEW OF SYSTEMS:  CONSTITUTIONAL: No weakness, fevers or chills  EYES/ENT: No visual changes;  No vertigo or throat pain   NECK: No pain or stiffness  RESPIRATORY: No cough, wheezing, hemoptysis; No shortness of breath  CARDIOVASCULAR: No chest pain or palpitations  GASTROINTESTINAL: No abdominal pain. No nausea, vomiting, or hematemesis; No diarrhea or constipation. No melena or hematochezia.  GENITOURINARY: No dysuria, frequency or hematuria  NEUROLOGICAL: No numbness or weakness  SKIN: No itching or rash  All other review of systems is negative unless indicated above  VITAL SIGNS:   Vital Signs Last 24 Hrs  T(C): 36.7 (18 Oct 2023 08:18), Max: 36.7 (18 Oct 2023 06:06)  T(F): 98.1 (18 Oct 2023 08:18), Max: 98.1 (18 Oct 2023 08:18)  HR: 110 (18 Oct 2023 08:18) (50 - 110)  BP: 108/74 (18 Oct 2023 08:18) (108/74 - 167/55)  BP(mean): --  RR: 18 (18 Oct 2023 08:18) (18 - 18)  SpO2: 98% (18 Oct 2023 08:18) (98% - 98%)    Parameters below as of 18 Oct 2023 08:18  Patient On (Oxygen Delivery Method): room air    I&O's Summary    On Exam:  Constitutional: NAD, alert and oriented x 3, faril  Lungs:  Non-labored, breath sounds are diminished bilaterally, No wheezing, rales +rhonchi  Cardiovascular: IRRR, bradycardic.  S1 and S2 positive.  +murmurs, rubs, gallops or clicks  Gastrointestinal: Bowel Sounds present, soft, nontender.   Lymph: No peripheral edema. No cervical lymphadenopathy.  Neurological: Alert, no focal deficits  Skin: No rashes or ulcers   Psych:  Mood & affect appropriate.    LABS: All Labs Reviewed:                        12.0   11.97 )-----------( 329      ( 18 Oct 2023 07:55 )             35.3                         12.4   11.93 )-----------( 365      ( 18 Oct 2023 06:25 )             37.0     18 Oct 2023 06:25    139    |  102    |  44     ----------------------------<  96     3.1     |  25     |  1.60     Ca    8.5        18 Oct 2023 06:25    TPro  6.9    /  Alb  2.9    /  TBili  1.1    /  DBili  x      /  AST  29     /  ALT  22     /  AlkPhos  79     18 Oct 2023 06:25    PT/INR - ( 18 Oct 2023 07:55 )   PT: 96.3 sec;   INR: 8.77 ratio      PTT - ( 18 Oct 2023 06:25 )  PTT:59.4 sec    RADIOLOGY:    EKG:  Afib with slow VR, 48, with RBBB, unchanged from outpatient EKG.     [facility] - Cayla Orta Pannella, Patel, Savella, [facility]  Office Number: [phone]    Initial Consult Note:  This is a 89 y/o M with Afib (on Coumadin) and HTN presented to the ED with FTT, weakness, fatigue, dehydration, and decreased urine output with recent Dx of Covid (10 days ago), found to have supratherapeutic INR (~8), BERNADETTE, and mildly elevated troponin of 76.    CHIEF COMPLAINT: Patient is a 89y old  Male who presents with a chief complaint of     HPI: This is a 89 y/o M with Afib (on Coumadin) and HTN presented to the ED with FTT, weakness, fatigue, dehydration, and decreased urine output with recent Dx of Covid (10 days ago), found to have supratherapeutic INR (~8), BERNADETTE, and mildly elevated troponin of 76.  per daughter, patient has been coughing productively with SOB since he was diagnosed with COVID from a home kit.  She called patient's PCP who was concerned that he was having a Pna and prescribed him Keflex on Satruday.  Patient then started having diarrhea.  Denies CP or palpitations.    PAST MEDICAL & SURGICAL HISTORY:  HTN (hypertension)  Atrial fibrillation  Degeneration macular    SOCIAL HISTORY:  No tobacco, ethanol, or drug abuse.  FAMILY HISTORY:    No family history of acute MI or sudden cardiac death.  MEDICATIONS  (STANDING):    MEDICATIONS  (PRN):    Allergies    No Known Allergies    Intolerances    REVIEW OF SYSTEMS:  CONSTITUTIONAL: No weakness, fevers or chills  EYES/ENT: No visual changes;  No vertigo or throat pain   NECK: No pain or stiffness  RESPIRATORY: No cough, wheezing, hemoptysis; No shortness of breath  CARDIOVASCULAR: No chest pain or palpitations  GASTROINTESTINAL: No abdominal pain. No nausea, vomiting, or hematemesis; No diarrhea or constipation. No melena or hematochezia.  GENITOURINARY: No dysuria, frequency or hematuria  NEUROLOGICAL: No numbness or weakness  SKIN: No itching or rash  All other review of systems is negative unless indicated above  VITAL SIGNS:   Vital Signs Last 24 Hrs  T(C): 36.7 (18 Oct 2023 08:18), Max: 36.7 (18 Oct 2023 06:06)  T(F): 98.1 (18 Oct 2023 08:18), Max: 98.1 (18 Oct 2023 08:18)  HR: 110 (18 Oct 2023 08:18) (50 - 110)  BP: 108/74 (18 Oct 2023 08:18) (108/74 - 167/55)  BP(mean): --  RR: 18 (18 Oct 2023 08:18) (18 - 18)  SpO2: 98% (18 Oct 2023 08:18) (98% - 98%)    Parameters below as of 18 Oct 2023 08:18  Patient On (Oxygen Delivery Method): room air    I&O's Summary    On Exam:  Constitutional: NAD, alert and oriented x 3, frail cachetic   HEENT dry MM anicteric   Lungs:  Non-labored, breath sounds are diminished bilaterally, No wheezing, rales +rhonchi  Cardiovascular: IRRR, bradycardic.  S1 and S2 positive.  +murmurs, rubs, gallops or clicks  Gastrointestinal: Bowel Sounds present, soft, nontender.   Lymph: No peripheral edema. No cervical lymphadenopathy.  Neurological: Alert, no focal deficits  Skin: No rashes or ulcers   Psych:  Mood & affect appropriate.    LABS: All Labs Reviewed:                        12.0   11.97 )-----------( 329      ( 18 Oct 2023 07:55 )             35.3                         12.4   11.93 )-----------( 365      ( 18 Oct 2023 06:25 )             37.0     18 Oct 2023 06:25    139    |  102    |  44     ----------------------------<  96     3.1     |  25     |  1.60     Ca    8.5        18 Oct 2023 06:25    TPro  6.9    /  Alb  2.9    /  TBili  1.1    /  DBili  x      /  AST  29     /  ALT  22     /  AlkPhos  79     18 Oct 2023 06:25    PT/INR - ( 18 Oct 2023 07:55 )   PT: 96.3 sec;   INR: 8.77 ratio      PTT - ( 18 Oct 2023 06:25 )  PTT:59.4 sec    RADIOLOGY:    EKG:  Afib with slow VR, 48, with RBBB, unchanged from outpatient EKG.

## 2023-10-18 NOTE — ED PROVIDER NOTE - PROGRESS NOTE DETAILS
Daughter Shilpa Penaloza [phone] Bedside ultrasound with 1300 cc in bladder.  Will order Craig Daughter Shilpa Penaloza [phone]    Daughter states patient has been on a decline since suffering a left hip fracture 3 years ago and she has noticed that he has particularly weakened over the last 6 months.  He ambulates with a walker and with assistance at home but recently has not been getting out of bed.

## 2023-10-18 NOTE — ED PROVIDER NOTE - OBJECTIVE STATEMENT
89-year-old male with a history of A-fib, HTN, macular degeneration presents with weakness, fatigue, dehydration, decreased p.o. intake, decreased urine output.  History provided by patient and daughter.  Patient lives with his wife.  States that he was diagnosed with COVID approximately 10 days ago and ever since then he has had a very poor appetite and has felt very weak.  He saw his primary care doctor last week and was told that his symptoms have been persistent for too long and therefore was not able to take Paxlovid.  His primary care doctor was concerned that patient may have pneumonia and started him on Keflex.  Patient took Keflex for 2 days and then developed several episodes of diarrhea.  He stopped taking the Keflex and his diarrhea resolved.  Daughter states that patient has an aide for 12 hours a day 7 days a week.  PMD aHng Leger, Cardiology Dr. Kulkarni 89-year-old male with a history of A-fib, HTN, macular degeneration presents with weakness, fatigue, dehydration, decreased p.o. intake, decreased urine output.  History provided by patient and daughter.  Patient lives with his wife.  States that he was diagnosed with COVID approximately 10 days ago and ever since then he has had a very poor appetite, dry cough, and has felt very weak.  He saw his primary care doctor last week and was told that his symptoms had been persistent for too long and therefore was not able to take Paxlovid.  His primary care doctor was concerned that patient may have pneumonia and started him on Keflex.  Patient took Keflex for 2 days and then developed several episodes of diarrhea.  He stopped taking the Keflex and his diarrhea resolved.  Daughter states that patient has an aide for 12 hours a day 7 days a week. He denies fever, chest pain, SOB.  PMD Hang Leger, Cardiology Dr. Kulkarni

## 2023-10-18 NOTE — ED ADULT NURSE NOTE - NSFALLHARMRISKINTERV_ED_ALL_ED

## 2023-10-18 NOTE — ED ADULT NURSE REASSESSMENT NOTE - NS ED NURSE REASSESS COMMENT FT1
0745: per MD, the patient has significant infiltrate on xray, and will therefore be admitted for pneumonia.  HIs INR is elevated, and this will be treated as well.  His potassium  level is also low, and so he will also be medicated for this as well.   MD is placing admission order.  the patient is aware of the plan of care at this time and complies with the plan  labs collected prior to administration of Kcentra.  efli walter.

## 2023-10-19 LAB
ANION GAP SERPL CALC-SCNC: 7 MMOL/L — SIGNIFICANT CHANGE UP (ref 5–17)
ANION GAP SERPL CALC-SCNC: 7 MMOL/L — SIGNIFICANT CHANGE UP (ref 5–17)
ANION GAP SERPL CALC-SCNC: 8 MMOL/L — SIGNIFICANT CHANGE UP (ref 5–17)
ANION GAP SERPL CALC-SCNC: 8 MMOL/L — SIGNIFICANT CHANGE UP (ref 5–17)
BUN SERPL-MCNC: 25 MG/DL — HIGH (ref 7–23)
BUN SERPL-MCNC: 25 MG/DL — HIGH (ref 7–23)
BUN SERPL-MCNC: 29 MG/DL — HIGH (ref 7–23)
BUN SERPL-MCNC: 29 MG/DL — HIGH (ref 7–23)
CALCIUM SERPL-MCNC: 7.9 MG/DL — LOW (ref 8.5–10.1)
CALCIUM SERPL-MCNC: 7.9 MG/DL — LOW (ref 8.5–10.1)
CALCIUM SERPL-MCNC: 8.1 MG/DL — LOW (ref 8.5–10.1)
CALCIUM SERPL-MCNC: 8.1 MG/DL — LOW (ref 8.5–10.1)
CHLORIDE SERPL-SCNC: 105 MMOL/L — SIGNIFICANT CHANGE UP (ref 96–108)
CHLORIDE SERPL-SCNC: 105 MMOL/L — SIGNIFICANT CHANGE UP (ref 96–108)
CHLORIDE SERPL-SCNC: 106 MMOL/L — SIGNIFICANT CHANGE UP (ref 96–108)
CHLORIDE SERPL-SCNC: 106 MMOL/L — SIGNIFICANT CHANGE UP (ref 96–108)
CO2 SERPL-SCNC: 26 MMOL/L — SIGNIFICANT CHANGE UP (ref 22–31)
CO2 SERPL-SCNC: 26 MMOL/L — SIGNIFICANT CHANGE UP (ref 22–31)
CO2 SERPL-SCNC: 27 MMOL/L — SIGNIFICANT CHANGE UP (ref 22–31)
CO2 SERPL-SCNC: 27 MMOL/L — SIGNIFICANT CHANGE UP (ref 22–31)
CREAT SERPL-MCNC: 1 MG/DL — SIGNIFICANT CHANGE UP (ref 0.5–1.3)
CULTURE RESULTS: NO GROWTH — SIGNIFICANT CHANGE UP
CULTURE RESULTS: NO GROWTH — SIGNIFICANT CHANGE UP
EGFR: 70 ML/MIN/1.73M2 — SIGNIFICANT CHANGE UP
GLUCOSE SERPL-MCNC: 100 MG/DL — HIGH (ref 70–99)
GLUCOSE SERPL-MCNC: 100 MG/DL — HIGH (ref 70–99)
GLUCOSE SERPL-MCNC: 111 MG/DL — HIGH (ref 70–99)
GLUCOSE SERPL-MCNC: 111 MG/DL — HIGH (ref 70–99)
HCT VFR BLD CALC: 29.7 % — LOW (ref 39–50)
HCT VFR BLD CALC: 29.7 % — LOW (ref 39–50)
HGB BLD-MCNC: 10.2 G/DL — LOW (ref 13–17)
HGB BLD-MCNC: 10.2 G/DL — LOW (ref 13–17)
INR BLD: 4.34 RATIO — HIGH (ref 0.85–1.18)
INR BLD: 4.34 RATIO — HIGH (ref 0.85–1.18)
MCHC RBC-ENTMCNC: 31.8 PG — SIGNIFICANT CHANGE UP (ref 27–34)
MCHC RBC-ENTMCNC: 31.8 PG — SIGNIFICANT CHANGE UP (ref 27–34)
MCHC RBC-ENTMCNC: 34.3 GM/DL — SIGNIFICANT CHANGE UP (ref 32–36)
MCHC RBC-ENTMCNC: 34.3 GM/DL — SIGNIFICANT CHANGE UP (ref 32–36)
MCV RBC AUTO: 92.5 FL — SIGNIFICANT CHANGE UP (ref 80–100)
MCV RBC AUTO: 92.5 FL — SIGNIFICANT CHANGE UP (ref 80–100)
NRBC # BLD: 0 /100 WBCS — SIGNIFICANT CHANGE UP (ref 0–0)
NRBC # BLD: 0 /100 WBCS — SIGNIFICANT CHANGE UP (ref 0–0)
PLATELET # BLD AUTO: 278 K/UL — SIGNIFICANT CHANGE UP (ref 150–400)
PLATELET # BLD AUTO: 278 K/UL — SIGNIFICANT CHANGE UP (ref 150–400)
POTASSIUM SERPL-MCNC: 2.8 MMOL/L — CRITICAL LOW (ref 3.5–5.3)
POTASSIUM SERPL-MCNC: 2.8 MMOL/L — CRITICAL LOW (ref 3.5–5.3)
POTASSIUM SERPL-MCNC: 3.5 MMOL/L — SIGNIFICANT CHANGE UP (ref 3.5–5.3)
POTASSIUM SERPL-MCNC: 3.5 MMOL/L — SIGNIFICANT CHANGE UP (ref 3.5–5.3)
POTASSIUM SERPL-SCNC: 2.8 MMOL/L — CRITICAL LOW (ref 3.5–5.3)
POTASSIUM SERPL-SCNC: 2.8 MMOL/L — CRITICAL LOW (ref 3.5–5.3)
POTASSIUM SERPL-SCNC: 3.5 MMOL/L — SIGNIFICANT CHANGE UP (ref 3.5–5.3)
POTASSIUM SERPL-SCNC: 3.5 MMOL/L — SIGNIFICANT CHANGE UP (ref 3.5–5.3)
PROTHROM AB SERPL-ACNC: 48.7 SEC — HIGH (ref 9.5–13)
PROTHROM AB SERPL-ACNC: 48.7 SEC — HIGH (ref 9.5–13)
RBC # BLD: 3.21 M/UL — LOW (ref 4.2–5.8)
RBC # BLD: 3.21 M/UL — LOW (ref 4.2–5.8)
RBC # FLD: 13.2 % — SIGNIFICANT CHANGE UP (ref 10.3–14.5)
RBC # FLD: 13.2 % — SIGNIFICANT CHANGE UP (ref 10.3–14.5)
SODIUM SERPL-SCNC: 139 MMOL/L — SIGNIFICANT CHANGE UP (ref 135–145)
SODIUM SERPL-SCNC: 139 MMOL/L — SIGNIFICANT CHANGE UP (ref 135–145)
SODIUM SERPL-SCNC: 140 MMOL/L — SIGNIFICANT CHANGE UP (ref 135–145)
SODIUM SERPL-SCNC: 140 MMOL/L — SIGNIFICANT CHANGE UP (ref 135–145)
SPECIMEN SOURCE: SIGNIFICANT CHANGE UP
SPECIMEN SOURCE: SIGNIFICANT CHANGE UP
TROPONIN I, HIGH SENSITIVITY RESULT: 61.1 NG/L — SIGNIFICANT CHANGE UP
TROPONIN I, HIGH SENSITIVITY RESULT: 61.1 NG/L — SIGNIFICANT CHANGE UP
WBC # BLD: 9.43 K/UL — SIGNIFICANT CHANGE UP (ref 3.8–10.5)
WBC # BLD: 9.43 K/UL — SIGNIFICANT CHANGE UP (ref 3.8–10.5)
WBC # FLD AUTO: 9.43 K/UL — SIGNIFICANT CHANGE UP (ref 3.8–10.5)
WBC # FLD AUTO: 9.43 K/UL — SIGNIFICANT CHANGE UP (ref 3.8–10.5)

## 2023-10-19 PROCEDURE — 99232 SBSQ HOSP IP/OBS MODERATE 35: CPT

## 2023-10-19 RX ORDER — ACETAMINOPHEN 500 MG
650 TABLET ORAL EVERY 6 HOURS
Refills: 0 | Status: DISCONTINUED | OUTPATIENT
Start: 2023-10-19 | End: 2023-10-23

## 2023-10-19 RX ORDER — ASCORBIC ACID 60 MG
500 TABLET,CHEWABLE ORAL
Refills: 0 | Status: DISCONTINUED | OUTPATIENT
Start: 2023-10-19 | End: 2023-10-23

## 2023-10-19 RX ORDER — MULTIVIT-MIN/FERROUS GLUCONATE 9 MG/15 ML
1 LIQUID (ML) ORAL DAILY
Refills: 0 | Status: DISCONTINUED | OUTPATIENT
Start: 2023-10-19 | End: 2023-10-23

## 2023-10-19 RX ORDER — POTASSIUM CHLORIDE 20 MEQ
20 PACKET (EA) ORAL
Refills: 0 | Status: COMPLETED | OUTPATIENT
Start: 2023-10-19 | End: 2023-10-19

## 2023-10-19 RX ORDER — POTASSIUM CHLORIDE 20 MEQ
10 PACKET (EA) ORAL
Refills: 0 | Status: DISCONTINUED | OUTPATIENT
Start: 2023-10-19 | End: 2023-10-19

## 2023-10-19 RX ORDER — POTASSIUM CHLORIDE 20 MEQ
40 PACKET (EA) ORAL ONCE
Refills: 0 | Status: COMPLETED | OUTPATIENT
Start: 2023-10-19 | End: 2023-10-19

## 2023-10-19 RX ADMIN — PIPERACILLIN AND TAZOBACTAM 25 GRAM(S): 4; .5 INJECTION, POWDER, LYOPHILIZED, FOR SOLUTION INTRAVENOUS at 15:15

## 2023-10-19 RX ADMIN — Medication 40 MILLIEQUIVALENT(S): at 17:58

## 2023-10-19 RX ADMIN — FINASTERIDE 5 MILLIGRAM(S): 5 TABLET, FILM COATED ORAL at 11:46

## 2023-10-19 RX ADMIN — PIPERACILLIN AND TAZOBACTAM 25 GRAM(S): 4; .5 INJECTION, POWDER, LYOPHILIZED, FOR SOLUTION INTRAVENOUS at 21:44

## 2023-10-19 RX ADMIN — TAMSULOSIN HYDROCHLORIDE 0.4 MILLIGRAM(S): 0.4 CAPSULE ORAL at 21:44

## 2023-10-19 RX ADMIN — Medication 20 MILLIEQUIVALENT(S): at 12:39

## 2023-10-19 RX ADMIN — PIPERACILLIN AND TAZOBACTAM 25 GRAM(S): 4; .5 INJECTION, POWDER, LYOPHILIZED, FOR SOLUTION INTRAVENOUS at 05:01

## 2023-10-19 RX ADMIN — Medication 500 MILLIGRAM(S): at 17:59

## 2023-10-19 RX ADMIN — Medication 20 MILLIEQUIVALENT(S): at 10:01

## 2023-10-19 NOTE — DIETITIAN INITIAL EVALUATION ADULT - NSFNSPHYEXAMSKINFT_GEN_A_CORE
Pressure Injury 1: sacrum, Stage II  Pressure Injury 2: sacrum, Stage I  Pressure Injury 3: Left:, buttocks, Stage I  Pressure Injury 4: Right:, heel, Stage I  Pressure Injury 5: Left:, elbow, Stage I

## 2023-10-19 NOTE — SOCIAL WORK PROGRESS NOTE - NSSWPROGRESSNOTE_GEN_ALL_CORE
Pt admitted from home Covid + Pt lives with spouse and HHA 7:00 am-7:00 pm and wife assists in the evening. MD to order pt eval family may consider subacute rehab. Sw will remain available.

## 2023-10-19 NOTE — PROGRESS NOTE ADULT - SUBJECTIVE AND OBJECTIVE BOX
Patient is a 89y old  Male who presents with a chief complaint of Pneumonia due to infectious organism     (19 Oct 2023 14:08)    Patient seen in follow up for BERNADETTE.        PAST MEDICAL HISTORY:  HTN (hypertension)    Atrial fibrillation    Degeneration macular      MEDICATIONS  (STANDING):  ascorbic acid 500 milliGRAM(s) Oral two times a day  dextrose 5% + sodium chloride 0.45%. 1000 milliLiter(s) (50 mL/Hr) IV Continuous <Continuous>  finasteride 5 milliGRAM(s) Oral daily  multivitamin/minerals 1 Tablet(s) Oral daily  piperacillin/tazobactam IVPB.. 3.375 Gram(s) IV Intermittent every 8 hours  potassium chloride    Tablet ER 40 milliEquivalent(s) Oral once  tamsulosin 0.4 milliGRAM(s) Oral at bedtime    MEDICATIONS  (PRN):  acetaminophen     Tablet .. 650 milliGRAM(s) Oral every 6 hours PRN Temp greater or equal to 38.5C (101.3F), Moderate Pain (4 - 6)    T(C): 36.6 (10-19-23 @ 12:39), Max: 37.1 (10-18-23 @ 22:06)  HR: 56 (10-19-23 @ 12:39) (38 - 110)  BP: 130/38 (10-19-23 @ 12:39) (108/74 - 167/55)  RR: 17 (10-19-23 @ 12:39)  SpO2: 95% (10-19-23 @ 12:39)  Wt(kg): --  I&O's Detail    18 Oct 2023 07:01  -  19 Oct 2023 07:00  --------------------------------------------------------  IN:    dextrose 5% + sodium chloride 0.45%: 450 mL  Total IN: 450 mL    OUT:    Voided (mL): 800 mL  Total OUT: 800 mL    Total NET: -350 mL      19 Oct 2023 07:01  -  19 Oct 2023 15:51  --------------------------------------------------------  IN:  Total IN: 0 mL    OUT:    Indwelling Catheter - Urethral (mL): 400 mL  Total OUT: 400 mL    Total NET: -400 mL              PHYSICAL EXAM:  General: No distress  Respiratory: b/l air entry  Cardiovascular: S1 S2  Gastrointestinal: soft  Extremities:  edema                           LABORATORY:                        10.2   9.43  )-----------( 278      ( 19 Oct 2023 05:40 )             29.7     10-19    140  |  106  |  29<H>  ----------------------------<  100<H>  2.8<LL>   |  27  |  1.00    Ca    7.9<L>      19 Oct 2023 05:40    TPro  6.9  /  Alb  2.9<L>  /  TBili  1.1  /  DBili  x   /  AST  29  /  ALT  22  /  AlkPhos  79  10-18    Sodium: 140 mmol/L (10-19 @ 05:40)  Sodium: 139 mmol/L (10-18 @ 06:25)    Potassium: 2.8 mmol/L (10-19 @ 05:40)  Potassium: 3.1 mmol/L (10-18 @ 06:25)    Hemoglobin: 10.2 g/dL (10-19 @ 05:40)  Hemoglobin: 11.3 g/dL (10-18 @ 12:17)  Hemoglobin: 12.0 g/dL (10-18 @ 07:55)  Hemoglobin: 12.4 g/dL (10-18 @ 06:25)    Creatinine, Serum 1.00 (10-19 @ 05:40)  Creatinine, Serum 1.60 (10-18 @ 06:25)        LIVER FUNCTIONS - ( 18 Oct 2023 06:25 )  Alb: 2.9 g/dL / Pro: 6.9 g/dL / ALK PHOS: 79 U/L / ALT: 22 U/L / AST: 29 U/L / GGT: x           Urinalysis Basic - ( 19 Oct 2023 05:40 )    Color: x / Appearance: x / SG: x / pH: x  Gluc: 100 mg/dL / Ketone: x  / Bili: x / Urobili: x   Blood: x / Protein: x / Nitrite: x   Leuk Esterase: x / RBC: x / WBC x   Sq Epi: x / Non Sq Epi: x / Bacteria: x

## 2023-10-19 NOTE — DIETITIAN INITIAL EVALUATION ADULT - PERTINENT LABORATORY DATA
10-19    140  |  106  |  29<H>  ----------------------------<  100<H>  2.8<LL>   |  27  |  1.00    Ca    7.9<L>      19 Oct 2023 05:40    TPro  6.9  /  Alb  2.9<L>  /  TBili  1.1  /  DBili  x   /  AST  29  /  ALT  22  /  AlkPhos  79  10-18

## 2023-10-19 NOTE — PATIENT CHOICE NOTE. - NSPTCHOICENOTES_GEN_ALL_CORE
Unsure of discharge needs at present. D/C resource folder provided at bedside this includes lists for both rehab facilities and home care agencies.  Pt daughter stated her father had previous HCS in the past but is unsure of the agency, also has private PT in the home prescribed by PCP.

## 2023-10-19 NOTE — DIETITIAN INITIAL EVALUATION ADULT - SIGNS/SYMPTOMS
as evidenced by pressure injuries.  as evidenced by NFPE findings- moderate/severe muscle depletion/fat loss, <75% of EEN for >1 mon.

## 2023-10-19 NOTE — DIETITIAN INITIAL EVALUATION ADULT - ADD RECOMMEND
1) Recommend liberalizing diet to regular; honor food preferences as able to optimize po intake/tolerance  2) Recommend ensure plus HP bid (350kcal, 20gm protein per 8 fl oz serving)  3) Recommend MVI daily and Vit C 500mg bid  4) Monitor po intake, diet tolerance, weight trends, labs, GI function, skin integrity

## 2023-10-19 NOTE — PROGRESS NOTE ADULT - SUBJECTIVE AND OBJECTIVE BOX
[facility] -- Cayla Orta Pannella, Patel, Savella Goodger, Cohen  Office # [phone]      Follow Up:    Af elevated troponin   Subjective/Observations:     Seen bedside, unable to provide any meaningful information laying flat on room air     REVIEW OF SYSTEMS: All other review of systems is negative unless indicated above    PAST MEDICAL & SURGICAL HISTORY:  HTN (hypertension)      Atrial fibrillation      Degeneration macular          MEDICATIONS  (STANDING):  dextrose 5% + sodium chloride 0.45%. 1000 milliLiter(s) (50 mL/Hr) IV Continuous <Continuous>  finasteride 5 milliGRAM(s) Oral daily  piperacillin/tazobactam IVPB.. 3.375 Gram(s) IV Intermittent every 8 hours  potassium chloride    Tablet ER 20 milliEquivalent(s) Oral every 2 hours  tamsulosin 0.4 milliGRAM(s) Oral at bedtime    MEDICATIONS  (PRN):      Allergies    No Known Allergies    Intolerances        Vital Signs Last 24 Hrs  T(C): 36.6 (19 Oct 2023 04:48), Max: 37.1 (18 Oct 2023 22:06)  T(F): 97.9 (19 Oct 2023 04:48), Max: 98.7 (18 Oct 2023 22:06)  HR: 50 (19 Oct 2023 04:48) (38 - 61)  BP: 113/53 (19 Oct 2023 04:48) (113/53 - 158/67)  BP(mean): --  RR: 16 (19 Oct 2023 04:48) (16 - 17)  SpO2: 95% (19 Oct 2023 04:48) (95% - 98%)    Parameters below as of 19 Oct 2023 04:48  Patient On (Oxygen Delivery Method): room air        I&O's Summary    18 Oct 2023 07:01  -  19 Oct 2023 07:00  --------------------------------------------------------  IN: 450 mL / OUT: 800 mL / NET: -350 mL          PHYSICAL EXAM:  TELE: AF  Constitutional: NAD, awake and alert, frail   HEENT: Moist Mucous Membranes, Anicteric  Pulmonary: Non-labored, breath sounds are diminished   Cardiovascular: IRRegular, S1 and S2 nl, murmur   Gastrointestinal: Bowel Sounds present, soft, nontender.   Lymph: No lymphadenopathy. No peripheral edema.  Skin: No visible rashes or ulcers.  Psych:  Mood & affect appropriate    LABS: All Labs Reviewed:                        10.2   9.43  )-----------( 278      ( 19 Oct 2023 05:40 )             29.7                         11.3   11.93 )-----------( 318      ( 18 Oct 2023 12:17 )             33.4                         12.0   11.97 )-----------( 329      ( 18 Oct 2023 07:55 )             35.3     19 Oct 2023 05:40    140    |  106    |  29     ----------------------------<  100    2.8     |  27     |  1.00   18 Oct 2023 06:25    139    |  102    |  44     ----------------------------<  96     3.1     |  25     |  1.60     Ca    7.9        19 Oct 2023 05:40  Ca    8.5        18 Oct 2023 06:25    TPro  6.9    /  Alb  2.9    /  TBili  1.1    /  DBili  x      /  AST  29     /  ALT  22     /  AlkPhos  79     18 Oct 2023 06:25    PT/INR - ( 19 Oct 2023 05:40 )   PT: 48.7 sec;   INR: 4.34 ratio         PTT - ( 18 Oct 2023 06:25 )  PTT:59.4 sec         EC Lead ECG:   Ventricular Rate 48 BPM    QRS Duration 154 ms    Q-T Interval 526 ms    QTC Calculation(Bazett) 469 ms    R Axis -6 degrees    T Axis 51 degrees    Diagnosis Line Atrial fibrillation with slow ventricular response  Right bundle branch block  Minimal voltage criteria for LVH, may be normal variant ( Sokolow-Sims )  Abnormal ECG  No previous ECGs available  Confirmed by AZUL FENG ([id number]) on 10/18/2023 7:09:03 PM (10-18-23 @ 07:31)        Radiology:

## 2023-10-19 NOTE — DIETITIAN INITIAL EVALUATION ADULT - ORAL INTAKE PTA/DIET HISTORY
Pt with decreased appetite/intake over the past few months. Significant decrease over the past 10 days with covid dx. Pt typically consumes up to 3 small portion meals/day. Needs encouragement/assistance at meal times. Consumes ensure supplement daily. Needs softer foods.

## 2023-10-19 NOTE — CARE COORDINATION ASSESSMENT. - PATIENT WAS INVOLVED WITH A HOMECARE AGENCY PRIOR TO ADMISSION?
pt daughter stated her father had previous HCS in the past but is unsure of the agency, also has private PT in the home prescribed by PCP/Yes

## 2023-10-19 NOTE — DIETITIAN INITIAL EVALUATION ADULT - ETIOLOGY
related to decreased ability to consume sufficient energy related to increased demand for nutrient (kcal/protein, micronutrients)

## 2023-10-19 NOTE — DIETITIAN INITIAL EVALUATION ADULT - PERTINENT MEDS FT
MEDICATIONS  (STANDING):  dextrose 5% + sodium chloride 0.45%. 1000 milliLiter(s) (50 mL/Hr) IV Continuous <Continuous>  finasteride 5 milliGRAM(s) Oral daily  piperacillin/tazobactam IVPB.. 3.375 Gram(s) IV Intermittent every 8 hours  potassium chloride  10 mEq/100 mL IVPB 10 milliEquivalent(s) IV Intermittent every 1 hour  tamsulosin 0.4 milliGRAM(s) Oral at bedtime    MEDICATIONS  (PRN):  acetaminophen     Tablet .. 650 milliGRAM(s) Oral every 6 hours PRN Temp greater or equal to 38.5C (101.3F), Moderate Pain (4 - 6)

## 2023-10-19 NOTE — CARE COORDINATION ASSESSMENT. - NSPASTMEDSURGHISTORY_GEN_ALL_CORE_FT
PAST MEDICAL & SURGICAL HISTORY:  Degeneration macular      Atrial fibrillation      HTN (hypertension)

## 2023-10-19 NOTE — DIETITIAN INITIAL EVALUATION ADULT - OTHER INFO
Pt is a "89-year-old male with a history of A-fib on coumadin,  HTN, macular degeneration , dx with COVID 10days ago, presents with weakness, cough, fatigue, dehydration, decreased p.o. intake, decreased urine output."    Visited pt at bedside this afternoon. Pt family present during visit. Pt with decreased appetite/intake. Observed pt consume ~25% of lunch meal. PO intakes % per nursing documentation. No food allergies. Pt with difficulty chewing some foods (tough meats, green salads); diet office made aware. No N/V/D/C. No BMs thus far. CBW on admission 149#. Family reports recent wt of 130#. Previous wt of 175# ~5 years ago. Gradual wt loss over the past 5 years. No edema noted. Skin: stage II pressure ulcer to sacrum. Pt currently on DASH/TLC diet. Recommend liberalizing diet to regular with ensure plus HP BID. Additional food preferences obtained to optimize po intake/tolerance.  Pt is a "89-year-old male with a history of A-fib on coumadin,  HTN, macular degeneration , dx with COVID 10days ago, presents with weakness, cough, fatigue, dehydration, decreased p.o. intake, decreased urine output."    Visited pt at bedside this afternoon. Pt family present during visit. Pt with decreased appetite/intake. Observed pt consume ~25% of lunch meal. PO intakes % per nursing documentation. No food allergies. Pt with difficulty chewing some foods (tough meats, green salads); diet office made aware. No N/V/D/C. No BMs thus far. CBW on admission 149#. Family reports recent wt of 130#. Previous wt of 175# ~5 years ago. Gradual wt loss over the past 5 years. No edema noted. Skin: stage II pressure ulcer to sacrum. Pt currently on DASH/TLC diet. Recommend liberalizing diet to regular with ensure plus HP BID. Additional food preferences obtained to optimize po intake/tolerance. Recommend assistance/encouragement at meal times. K low (2.8); KCl rx. Electrolyte replacement prn.

## 2023-10-19 NOTE — CASE MANAGEMENT PROGRESS NOTE - NSCMPROGRESSNOTE_GEN_ALL_CORE
CM consult for stairs at home / health literacy noted and reviewed. CM to remain available and monitor for home care needs and assist in providing disease oriented education.

## 2023-10-19 NOTE — PROGRESS NOTE ADULT - SUBJECTIVE AND OBJECTIVE BOX
INTERVAL Hx:  COVID (+).  No DVT/PE.  No acute events overnight.  Craig draining clear, yellow urine, with minimal to no blood.  Craig placed yesterday for retention of >1500cc urine.  Flomax and Proscar started.    MEDICATIONS  (STANDING):  dextrose 5% + sodium chloride 0.45%. 1000 milliLiter(s) (50 mL/Hr) IV Continuous <Continuous>  finasteride 5 milliGRAM(s) Oral daily  piperacillin/tazobactam IVPB.. 3.375 Gram(s) IV Intermittent every 8 hours  tamsulosin 0.4 milliGRAM(s) Oral at bedtime    MEDICATIONS  (PRN):        Vital Signs Last 24 Hrs  T(C): 36.6 (19 Oct 2023 04:48), Max: 37.1 (18 Oct 2023 22:06)  T(F): 97.9 (19 Oct 2023 04:48), Max: 98.7 (18 Oct 2023 22:06)  HR: 50 (19 Oct 2023 04:48) (38 - 110)  BP: 113/53 (19 Oct 2023 04:48) (108/74 - 158/67)  BP(mean): --  RR: 16 (19 Oct 2023 04:48) (16 - 18)  SpO2: 95% (19 Oct 2023 04:48) (95% - 98%)    Parameters below as of 19 Oct 2023 04:48  Patient On (Oxygen Delivery Method): room air        PHYSICAL EXAM:    ABDOMEN: soft, NT    Craig: minimal to no blood in urine drainage    LABS:                        11.3   11.93 )-----------( 318      ( 18 Oct 2023 12:17 )             33.4     10-18    139  |  102  |  44<H>  ----------------------------<  96  3.1<L>   |  25  |  1.60<H>    Ca    8.5      18 Oct 2023 06:25    TPro  6.9  /  Alb  2.9<L>  /  TBili  1.1  /  DBili  x   /  AST  29  /  ALT  22  /  AlkPhos  79  10-18      RADIOLOGY & ADDITIONAL TESTS:  < from: CT Chest No Cont (10.18.23 @ 15:53) >    ACC: [id number] EXAM:  CT CHEST   ORDERED BY: JACKY JAIN     PROCEDURE DATE:  10/18/2023          INTERPRETATION:  CLINICAL INFORMATION: Weakness, fatigue. Recent COVID   infection.    COMPARISON: Chest x-ray 10/18/2023.    CONTRAST/COMPLICATIONS:  IV Contrast: NONE  Oral Contrast: NONE  Complications: None reported at time of study completion    PROCEDURE:  CT of the Chest was performed.  Sagittal and coronal reformats were performed.    FINDINGS:    LUNGS AND AIRWAYS: PLEURA: Hyperinflated lungs with multiple thin wall   cyst likely reflecting bleb/bullae.  There are secretions bilateral mainstem bronchi, the central airways are   patent.  There are bilateral lower lobe pleural/parenchymal airspace   consolidations. There are prominent bilateral calcified pleural plaques.  Possible trace right pleural effusion.    MEDIASTINUM AND DONNA: No lymphadenopathy.    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  Coronary arterycalcification.    HEART:  Cardiomegaly.  Aortic and mitral valvular calcifications.   No pericardial effusion.    CHEST WALL AND LOWER NECK: Within normal limits.    VISUALIZED UPPER ABDOMEN:  Possible wall thickening at the gastroesophageal junction.  Cholelithiasis.    BONES: Degenerative changes.    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    Cystic airspace disease likely reflecting bullae/blebs.    Possible wall thickening at the gastroesophageal junction.  Recommend further clinical correlation.    < from: US Duplex Venous Lower Ext Ltd, Right (10.18.23 @ 15:42) >  ACC: [id number] EXAM:  US DPLX LWR EXT VEINS LTD RT   ORDERED BY: JACKY JAIN     PROCEDURE DATE:  10/18/2023          INTERPRETATION:  CLINICAL INFORMATION: Pneumonia. Recent COVID. Evaluate   for DVT.    COMPARISON: None available.    TECHNIQUE:Duplex sonography of the RIGHT LOWER extremity veins with   color and spectral Doppler, with and without compression.    FINDINGS:    There is normal compressibility of the right common femoral, femoral and   popliteal veins.  The contralateral common femoral vein is patent.  Doppler examination shows normal spontaneous and phasic flow.    No calf vein thrombosis is detected.    IMPRESSION:    No evidence of right lower extremity deep venous thrombosis.

## 2023-10-19 NOTE — PROGRESS NOTE ADULT - SUBJECTIVE AND OBJECTIVE BOX
Patient is a 89y old  Male who presents with a chief complaint of elevated troponin (19 Oct 2023 10:19)      INTERVAL HPI/OVERNIGHT EVENTS: noted  pt seen and examined this am   events noted  was bradycardic to 39 last night -pt asymptomatic  HR 40s-50s      Vital Signs Last 24 Hrs  T(C): 36.6 (19 Oct 2023 12:39), Max: 37.1 (18 Oct 2023 22:06)  T(F): 97.8 (19 Oct 2023 12:39), Max: 98.7 (18 Oct 2023 22:06)  HR: 56 (19 Oct 2023 12:39) (38 - 61)  BP: 130/38 (19 Oct 2023 12:39) (113/53 - 158/67)  BP(mean): --  RR: 17 (19 Oct 2023 12:39) (16 - 17)  SpO2: 95% (19 Oct 2023 12:39) (95% - 98%)    Parameters below as of 19 Oct 2023 12:39  Patient On (Oxygen Delivery Method): room air        acetaminophen     Tablet .. 650 milliGRAM(s) Oral every 6 hours PRN  dextrose 5% + sodium chloride 0.45%. 1000 milliLiter(s) IV Continuous <Continuous>  finasteride 5 milliGRAM(s) Oral daily  piperacillin/tazobactam IVPB.. 3.375 Gram(s) IV Intermittent every 8 hours  potassium chloride  10 mEq/100 mL IVPB 10 milliEquivalent(s) IV Intermittent every 1 hour  tamsulosin 0.4 milliGRAM(s) Oral at bedtime      PHYSICAL EXAM:  GENERAL: NAD,   EYES: conjunctiva and sclera clear  ENMT: Moist mucous membranes  NECK: Supple, No JVD, Normal thyroid  CHEST/LUNG: non labored, cta b/l  HEART: Regular rate and rhythm; No murmurs, rubs, or gallops  ABDOMEN: Soft, Nontender, Nondistended; Bowel sounds present  EXTREMITIES:  2+ Peripheral Pulses, No clubbing, cyanosis, or edema  LYMPH: No lymphadenopathy noted  SKIN: No rashes or lesions    Consultant(s) Notes Reviewed:  [x ] YES  [ ] NO  Care Discussed with Consultants/Other Providers [ x] YES  [ ] NO    LABS:                        10.2   9.43  )-----------( 278      ( 19 Oct 2023 05:40 )             29.7     10-19    140  |  106  |  29<H>  ----------------------------<  100<H>  2.8<LL>   |  27  |  1.00    Ca    7.9<L>      19 Oct 2023 05:40    TPro  6.9  /  Alb  2.9<L>  /  TBili  1.1  /  DBili  x   /  AST  29  /  ALT  22  /  AlkPhos  79  10-18    PT/INR - ( 19 Oct 2023 05:40 )   PT: 48.7 sec;   INR: 4.34 ratio         PTT - ( 18 Oct 2023 06:25 )  PTT:59.4 sec  Urinalysis Basic - ( 19 Oct 2023 05:40 )    Color: x / Appearance: x / SG: x / pH: x  Gluc: 100 mg/dL / Ketone: x  / Bili: x / Urobili: x   Blood: x / Protein: x / Nitrite: x   Leuk Esterase: x / RBC: x / WBC x   Sq Epi: x / Non Sq Epi: x / Bacteria: x      CAPILLARY BLOOD GLUCOSE            Urinalysis Basic - ( 19 Oct 2023 05:40 )    Color: x / Appearance: x / SG: x / pH: x  Gluc: 100 mg/dL / Ketone: x  / Bili: x / Urobili: x   Blood: x / Protein: x / Nitrite: x   Leuk Esterase: x / RBC: x / WBC x   Sq Epi: x / Non Sq Epi: x / Bacteria: x        Culture - Blood (collected 18 Oct 2023 06:35)  Source: .Blood Blood-Peripheral  Preliminary Report (19 Oct 2023 09:02):    No growth at 24 hours    Culture - Blood (collected 18 Oct 2023 06:25)  Source: .Blood Blood-Peripheral  Preliminary Report (19 Oct 2023 09:02):    No growth at 24 hours        RADIOLOGY & ADDITIONAL TESTS:    Imaging Personally Reviewed:  [x ] YES  [ ] NO

## 2023-10-19 NOTE — CARE COORDINATION ASSESSMENT. - NSCAREPROVIDERS_GEN_ALL_CORE_FT
CARE PROVIDERS:  Accepting Physician: Adriana Guajardo  Administration: Darren Todd  Admitting: Adriana Guajardo  Attending: Adriana Guajardo  Case Management: Ivan Lopez  Case Management: Behringer, Megan  Consultant: Ilya Rico  Consultant: Twin Geller  Consultant: Estela Albrecht  Consultant: Mariangel Gilbert  Consultant: Ronnell Ly  Consultant: Maryjane Saldivar  Consultant: Lorena Cruz  Consultant: Denise Sarabia  Covering Team: Nancie Denson  Covering Team: Layo Iglesias  ED Attending: Lisa Hall  ED Nurse: Chan Lee/Billing & Coding: Yanet Lindsay  Infection Control: Zink, Corinne  Nurse: Rahel Leal  Nurse: Vanita Kong  Nurse: Vanita Josue  Ordered: ServiceAccount, SCMMLM  Ordered: ADM, User  Outpatient Provider: Ronnell Ly  Outpatient Provider: Bin Kulkarni  Override: Francia Gold  Override: Natividad Thomas  Override: Kade Loo  Override: Radha Mensah  PCA/Nursing Assistant: Noy Reyes  Registered Dietitian: Amarilys Willis  Respiratory Therapy: Cynthia Conner  : Suzie Olea   Ear Star Wedge Flap Text: The defect edges were debeveled with a #15 blade scalpel.  Given the location of the defect and the proximity to free margins (helical rim) an ear star wedge flap was deemed most appropriate.  Using a sterile surgical marker, the appropriate flap was drawn incorporating the defect and placing the expected incisions between the helical rim and antihelix where possible.  The area thus outlined was incised through and through with a #15 scalpel blade.

## 2023-10-19 NOTE — CARE COORDINATION ASSESSMENT. - OTHER PERTINENT DISCHARGE PLANNING INFORMATION:
Met daughter at bedside.  Explained role of CM, verbalized understanding. Pt daughter was made aware a CM will remain available through hospitalization.  Contact information given in discharge/ transitions resource folder. Pt lives with spouse and needs assistance w ADL. Pt has private pay aide service 7 days weekly / 12 hours daily. Pt daughter drives to appointments, pt has 3 stairs to enter home, stated everything is on same level, pt daughter stated her father had previous HCS in the past but is unsure of the agency, also has private PT in the home prescribed by PCP. Pt has wheelchair, walker, shower seat, and raised toilet seat.

## 2023-10-19 NOTE — PROGRESS NOTE ADULT - SUBJECTIVE AND OBJECTIVE BOX
[facility], Division of Infectious Diseases  JIAN Carrizales Y. Patel, S. Shah, G. [facility]  [phone]    Name: GURINDER TURNER  Age: 89y  Gender: Male  MRN: [id number]    Interval History:  Patient seen and examined at bedside this morning  No acute overnight events.   Notes reviewed    Antibiotics:  piperacillin/tazobactam IVPB.. 3.375 Gram(s) IV Intermittent every 8 hours      Medications:  dextrose 5% + sodium chloride 0.45%. 1000 milliLiter(s) IV Continuous <Continuous>  finasteride 5 milliGRAM(s) Oral daily  piperacillin/tazobactam IVPB.. 3.375 Gram(s) IV Intermittent every 8 hours  tamsulosin 0.4 milliGRAM(s) Oral at bedtime      Review of Systems:  A 10-point review of systems was obtained.     Pertinent positives and negatives--  Constitutional: No fevers. No Chills. No Rigors.   Cardiovascular: No chest pain. No palpitations.  Respiratory: No shortness of breath. No cough.  Gastrointestinal: No nausea or vomiting. No diarrhea or constipation.   Psychiatric: Pleasant. Appropriate affect.    Review of systems otherwise negative except as previously noted.    Allergies: No Known Allergies    For details regarding the patient's past medical history, social history, family history, and other miscellaneous elements, please refer the initial infectious diseases consultation and/or the admitting history and physical examination for this admission.    Objective:  Vitals:   T(C): 36.6 (10-19-23 @ 12:39), Max: 37.1 (10-18-23 @ 22:06)  HR: 56 (10-19-23 @ 12:39) (38 - 61)  BP: 130/38 (10-19-23 @ 12:39) (113/53 - 158/67)  RR: 17 (10-19-23 @ 12:39) (16 - 17)  SpO2: 95% (10-19-23 @ 12:39) (95% - 98%)    Physical Examination:  General: no acute distress  HEENT: NC/AT, EOMI, anicteric, no oral lesions  Neck: supple, no palpable LAD  Cardio: S1, S2 heard, RRR, no murmurs  Resp: breath sounds heard bilaterally, no rales, wheezes or rhonchi  Abd: soft, NT, ND, + bowel sounds  Neuro: no obvious focal deficits  Ext: no edema or cyanosis  Skin: warm, dry, no visible rash      Laboratory Studies:  CBC:                       10.2   9.43  )-----------( 278      ( 19 Oct 2023 05:40 )             29.7     CMP: 10-19    140  |  106  |  29<H>  ----------------------------<  100<H>  2.8<LL>   |  27  |  1.00    Ca    7.9<L>      19 Oct 2023 05:40    TPro  6.9  /  Alb  2.9<L>  /  TBili  1.1  /  DBili  x   /  AST  29  /  ALT  22  /  AlkPhos  79  10-18    LIVER FUNCTIONS - ( 18 Oct 2023 06:25 )  Alb: 2.9 g/dL / Pro: 6.9 g/dL / ALK PHOS: 79 U/L / ALT: 22 U/L / AST: 29 U/L / GGT: x           Urinalysis Basic - ( 19 Oct 2023 05:40 )    Color: x / Appearance: x / SG: x / pH: x  Gluc: 100 mg/dL / Ketone: x  / Bili: x / Urobili: x   Blood: x / Protein: x / Nitrite: x   Leuk Esterase: x / RBC: x / WBC x   Sq Epi: x / Non Sq Epi: x / Bacteria: x        Microbiology: reviewed    Culture - Blood (collected 10-18-23 @ 06:35)  Source: .Blood Blood-Peripheral  Preliminary Report (10-19-23 @ 09:02):    No growth at 24 hours    Culture - Blood (collected 10-18-23 @ 06:25)  Source: .Blood Blood-Peripheral  Preliminary Report (10-19-23 @ 09:02):    No growth at 24 hours          Radiology: reviewed      < from: CT Chest No Cont (10.18.23 @ 15:53) >    ACC: [id number] EXAM:  CT CHEST   ORDERED BY: JACKY JAIN     PROCEDURE DATE:  10/18/2023          INTERPRETATION:  CLINICAL INFORMATION: Weakness, fatigue. Recent COVID   infection.    COMPARISON: Chest x-ray 10/18/2023.    CONTRAST/COMPLICATIONS:  IV Contrast: NONE  Oral Contrast: NONE  Complications: None reported at time of study completion    PROCEDURE:  CT of the Chest was performed.  Sagittal and coronal reformats were performed.    FINDINGS:    LUNGS AND AIRWAYS: PLEURA: Hyperinflated lungs with multiple thin wall   cyst likely reflecting bleb/bullae.  There are secretions bilateral mainstem bronchi, the central airways are   patent.  There are bilateral lower lobe pleural/parenchymal airspace   consolidations. There are prominent bilateral calcified pleural plaques.  Possible trace right pleural effusion.    MEDIASTINUM AND DONNA: No lymphadenopathy.    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  Coronary arterycalcification.    HEART:  Cardiomegaly.  Aortic and mitral valvular calcifications.   No pericardial effusion.    CHEST WALL AND LOWER NECK: Within normal limits.    VISUALIZED UPPER ABDOMEN:  Possible wall thickening at the gastroesophageal junction.  Cholelithiasis.    BONES: Degenerative changes.    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    Cystic airspace disease likely reflecting bullae/blebs.    Possible wall thickening at the gastroesophageal junction.  Recommend further clinical correlation.    Other findings as discussed above.        --- End of Report ---            RENEA BUSTILLO MD; Attending Radiologist  This document has been electronically signed. Oct 18 2023  5:21PM    < end of copied text >  < from: US Duplex Venous Lower Ext Ltd, Right (10.18.23 @ 15:42) >    ACC: [id number] EXAM:  US DPLX LWR EXT VEINS LTD RT   ORDERED BY: JACKY JAIN     PROCEDURE DATE:  10/18/2023          INTERPRETATION:  CLINICAL INFORMATION: Pneumonia. Recent COVID. Evaluate   for DVT.    COMPARISON: None available.    TECHNIQUE:Duplex sonography of the RIGHT LOWER extremity veins with   color and spectral Doppler, with and without compression.    FINDINGS:    There is normal compressibility of the right common femoral, femoral and   popliteal veins.  The contralateral common femoral vein is patent.  Doppler examination shows normal spontaneous and phasic flow.    No calf vein thrombosis is detected.    IMPRESSION:    No evidence of right lower extremity deep venous thrombosis.            --- End of Report ---            RENEA BUSTILLO MD; Attending Radiologist  This document has been electronically signed. Oct 18 2023  4:00PM    < end of copied text >

## 2023-10-20 LAB
ANION GAP SERPL CALC-SCNC: 7 MMOL/L — SIGNIFICANT CHANGE UP (ref 5–17)
ANION GAP SERPL CALC-SCNC: 7 MMOL/L — SIGNIFICANT CHANGE UP (ref 5–17)
BUN SERPL-MCNC: 19 MG/DL — SIGNIFICANT CHANGE UP (ref 7–23)
BUN SERPL-MCNC: 19 MG/DL — SIGNIFICANT CHANGE UP (ref 7–23)
CALCIUM SERPL-MCNC: 7.9 MG/DL — LOW (ref 8.5–10.1)
CALCIUM SERPL-MCNC: 7.9 MG/DL — LOW (ref 8.5–10.1)
CHLORIDE SERPL-SCNC: 110 MMOL/L — HIGH (ref 96–108)
CHLORIDE SERPL-SCNC: 110 MMOL/L — HIGH (ref 96–108)
CO2 SERPL-SCNC: 25 MMOL/L — SIGNIFICANT CHANGE UP (ref 22–31)
CO2 SERPL-SCNC: 25 MMOL/L — SIGNIFICANT CHANGE UP (ref 22–31)
CREAT SERPL-MCNC: 0.86 MG/DL — SIGNIFICANT CHANGE UP (ref 0.5–1.3)
CREAT SERPL-MCNC: 0.86 MG/DL — SIGNIFICANT CHANGE UP (ref 0.5–1.3)
EGFR: 81 ML/MIN/1.73M2 — SIGNIFICANT CHANGE UP
EGFR: 81 ML/MIN/1.73M2 — SIGNIFICANT CHANGE UP
GLUCOSE SERPL-MCNC: 92 MG/DL — SIGNIFICANT CHANGE UP (ref 70–99)
GLUCOSE SERPL-MCNC: 92 MG/DL — SIGNIFICANT CHANGE UP (ref 70–99)
HCT VFR BLD CALC: 31.1 % — LOW (ref 39–50)
HCT VFR BLD CALC: 31.1 % — LOW (ref 39–50)
HGB BLD-MCNC: 10.4 G/DL — LOW (ref 13–17)
HGB BLD-MCNC: 10.4 G/DL — LOW (ref 13–17)
INR BLD: 3.69 RATIO — HIGH (ref 0.85–1.18)
INR BLD: 3.69 RATIO — HIGH (ref 0.85–1.18)
MCHC RBC-ENTMCNC: 31.5 PG — SIGNIFICANT CHANGE UP (ref 27–34)
MCHC RBC-ENTMCNC: 31.5 PG — SIGNIFICANT CHANGE UP (ref 27–34)
MCHC RBC-ENTMCNC: 33.4 GM/DL — SIGNIFICANT CHANGE UP (ref 32–36)
MCHC RBC-ENTMCNC: 33.4 GM/DL — SIGNIFICANT CHANGE UP (ref 32–36)
MCV RBC AUTO: 94.2 FL — SIGNIFICANT CHANGE UP (ref 80–100)
MCV RBC AUTO: 94.2 FL — SIGNIFICANT CHANGE UP (ref 80–100)
NRBC # BLD: 0 /100 WBCS — SIGNIFICANT CHANGE UP (ref 0–0)
NRBC # BLD: 0 /100 WBCS — SIGNIFICANT CHANGE UP (ref 0–0)
PLATELET # BLD AUTO: 285 K/UL — SIGNIFICANT CHANGE UP (ref 150–400)
PLATELET # BLD AUTO: 285 K/UL — SIGNIFICANT CHANGE UP (ref 150–400)
POTASSIUM SERPL-MCNC: 3.8 MMOL/L — SIGNIFICANT CHANGE UP (ref 3.5–5.3)
POTASSIUM SERPL-MCNC: 3.8 MMOL/L — SIGNIFICANT CHANGE UP (ref 3.5–5.3)
POTASSIUM SERPL-SCNC: 3.8 MMOL/L — SIGNIFICANT CHANGE UP (ref 3.5–5.3)
POTASSIUM SERPL-SCNC: 3.8 MMOL/L — SIGNIFICANT CHANGE UP (ref 3.5–5.3)
PROTHROM AB SERPL-ACNC: 41.6 SEC — HIGH (ref 9.5–13)
PROTHROM AB SERPL-ACNC: 41.6 SEC — HIGH (ref 9.5–13)
RBC # BLD: 3.3 M/UL — LOW (ref 4.2–5.8)
RBC # BLD: 3.3 M/UL — LOW (ref 4.2–5.8)
RBC # FLD: 13.3 % — SIGNIFICANT CHANGE UP (ref 10.3–14.5)
RBC # FLD: 13.3 % — SIGNIFICANT CHANGE UP (ref 10.3–14.5)
SODIUM SERPL-SCNC: 142 MMOL/L — SIGNIFICANT CHANGE UP (ref 135–145)
SODIUM SERPL-SCNC: 142 MMOL/L — SIGNIFICANT CHANGE UP (ref 135–145)
TSH SERPL-MCNC: 1.24 UIU/ML — SIGNIFICANT CHANGE UP (ref 0.36–3.74)
TSH SERPL-MCNC: 1.24 UIU/ML — SIGNIFICANT CHANGE UP (ref 0.36–3.74)
WBC # BLD: 8.39 K/UL — SIGNIFICANT CHANGE UP (ref 3.8–10.5)
WBC # BLD: 8.39 K/UL — SIGNIFICANT CHANGE UP (ref 3.8–10.5)
WBC # FLD AUTO: 8.39 K/UL — SIGNIFICANT CHANGE UP (ref 3.8–10.5)
WBC # FLD AUTO: 8.39 K/UL — SIGNIFICANT CHANGE UP (ref 3.8–10.5)

## 2023-10-20 PROCEDURE — 99232 SBSQ HOSP IP/OBS MODERATE 35: CPT

## 2023-10-20 RX ORDER — WARFARIN SODIUM 2.5 MG/1
5 TABLET ORAL ONCE
Refills: 0 | Status: DISCONTINUED | OUTPATIENT
Start: 2023-10-20 | End: 2023-10-20

## 2023-10-20 RX ADMIN — PIPERACILLIN AND TAZOBACTAM 25 GRAM(S): 4; .5 INJECTION, POWDER, LYOPHILIZED, FOR SOLUTION INTRAVENOUS at 13:02

## 2023-10-20 RX ADMIN — PIPERACILLIN AND TAZOBACTAM 25 GRAM(S): 4; .5 INJECTION, POWDER, LYOPHILIZED, FOR SOLUTION INTRAVENOUS at 22:04

## 2023-10-20 RX ADMIN — Medication 500 MILLIGRAM(S): at 06:14

## 2023-10-20 RX ADMIN — TAMSULOSIN HYDROCHLORIDE 0.4 MILLIGRAM(S): 0.4 CAPSULE ORAL at 22:04

## 2023-10-20 RX ADMIN — Medication 1 TABLET(S): at 10:46

## 2023-10-20 RX ADMIN — SODIUM CHLORIDE 50 MILLILITER(S): 9 INJECTION, SOLUTION INTRAVENOUS at 13:02

## 2023-10-20 RX ADMIN — Medication 500 MILLIGRAM(S): at 17:15

## 2023-10-20 RX ADMIN — PIPERACILLIN AND TAZOBACTAM 25 GRAM(S): 4; .5 INJECTION, POWDER, LYOPHILIZED, FOR SOLUTION INTRAVENOUS at 06:14

## 2023-10-20 RX ADMIN — FINASTERIDE 5 MILLIGRAM(S): 5 TABLET, FILM COATED ORAL at 10:45

## 2023-10-20 NOTE — PROGRESS NOTE ADULT - SUBJECTIVE AND OBJECTIVE BOX
[facility] -- Cayla Orta, Jacky Ashraf Savella, Goodger, Cohen  Office # [phone]    Follow Up:  AF, elv trop     Subjective/Observations: seen and examined, awake, alert, resting comfortably in bed, denies chest pain, dyspnea, palpitations or dizziness, orthopnea and PND. Tolerating room air.     REVIEW OF SYSTEMS: All other review of systems is negative unless indicated above  PAST MEDICAL & SURGICAL HISTORY:  HTN (hypertension)      Atrial fibrillation      Degeneration macular        MEDICATIONS  (STANDING):  ascorbic acid 500 milliGRAM(s) Oral two times a day  dextrose 5% + sodium chloride 0.45%. 1000 milliLiter(s) (50 mL/Hr) IV Continuous <Continuous>  finasteride 5 milliGRAM(s) Oral daily  multivitamin/minerals 1 Tablet(s) Oral daily  piperacillin/tazobactam IVPB.. 3.375 Gram(s) IV Intermittent every 8 hours  tamsulosin 0.4 milliGRAM(s) Oral at bedtime    MEDICATIONS  (PRN):  acetaminophen     Tablet .. 650 milliGRAM(s) Oral every 6 hours PRN Temp greater or equal to 38.5C (101.3F), Moderate Pain (4 - 6)    Allergies    No Known Allergies    Intolerances      Vital Signs Last 24 Hrs  T(C): 36.7 (20 Oct 2023 05:00), Max: 36.7 (20 Oct 2023 05:00)  T(F): 98 (20 Oct 2023 05:00), Max: 98 (20 Oct 2023 05:00)  HR: 50 (20 Oct 2023 05:00) (50 - 59)  BP: 130/56 (20 Oct 2023 05:00) (130/38 - 137/54)  BP(mean): --  RR: 17 (20 Oct 2023 05:00) (17 - 18)  SpO2: 94% (20 Oct 2023 05:00) (94% - 97%)    Parameters below as of 20 Oct 2023 05:00  Patient On (Oxygen Delivery Method): room air      I&O's Summary    19 Oct 2023 07:01  -  20 Oct 2023 07:00  --------------------------------------------------------  IN: 0 mL / OUT: 1450 mL / NET: -1450 mL          TELE: Afib with SVR  30-50's   PHYSICAL EXAM:  Constitutional: NAD, awake and alert  HEENT: Moist Mucous Membranes, Anicteric  Pulmonary: Non-labored, breath sounds diminished, No wheezing, rales or rhonchi  Cardiovascular: IRRR, S1 and S2, No murmurs, rubs, gallops or clicks  Gastrointestinal: Bowel Sounds present, soft, nontender.   Lymph: No peripheral edema. No lymphadenopathy.  Skin: No visible rashes or ulcers.  Psych:  Mood & affect appropriate  LABS: All Labs Reviewed:                        10.4   8.39  )-----------( 285      ( 20 Oct 2023 07:10 )             31.1                         10.2   9.43  )-----------( 278      ( 19 Oct 2023 05:40 )             29.7                         11.3   11.93 )-----------( 318      ( 18 Oct 2023 12:17 )             33.4     20 Oct 2023 07:10    142    |  110    |  19     ----------------------------<  92     3.8     |  25     |  0.86   19 Oct 2023 18:15    139    |  105    |  25     ----------------------------<  111    3.5     |  26     |  1.00   19 Oct 2023 05:40    140    |  106    |  29     ----------------------------<  100    2.8     |  27     |  1.00     Ca    7.9        20 Oct 2023 07:10  Ca    8.1        19 Oct 2023 18:15  Ca    7.9        19 Oct 2023 05:40    TPro  6.9    /  Alb  2.9    /  TBili  1.1    /  DBili  x      /  AST  29     /  ALT  22     /  AlkPhos  79     18 Oct 2023 06:25    PT/INR - ( 20 Oct 2023 07:10 )   PT: 41.6 sec;   INR: 3.69 ratio               12 Lead ECG:   Ventricular Rate 54 BPM    QRS Duration 154 ms    Q-T Interval 498 ms    QTC Calculation(Bazett) 472 ms    R Axis 1 degrees    T Axis 37 degrees    Diagnosis Line Atrial fibrillation with slow ventricular response  Right bundle branch block  Voltage criteria for left ventricular hypertrophy  Abnormal ECG  Confirmed by anne Geller ([id number]) on 10/19/2023 2:29:09 PM (10-18-23 @ 21:23)        [facility] -- Cayla Orta, Jacky Ashraf Savella, Goodger, Cohen  Office # [phone]    Follow Up:  AF, elv trop     Subjective/Observations: seen and examined, awake, alert, resting comfortably in bed, denies chest pain, dyspnea, palpitations or dizziness, orthopnea and PND. Tolerating room air.     REVIEW OF SYSTEMS: All other review of systems is negative unless indicated above  PAST MEDICAL & SURGICAL HISTORY:  HTN (hypertension)      Atrial fibrillation      Degeneration macular        MEDICATIONS  (STANDING):  ascorbic acid 500 milliGRAM(s) Oral two times a day  dextrose 5% + sodium chloride 0.45%. 1000 milliLiter(s) (50 mL/Hr) IV Continuous <Continuous>  finasteride 5 milliGRAM(s) Oral daily  multivitamin/minerals 1 Tablet(s) Oral daily  piperacillin/tazobactam IVPB.. 3.375 Gram(s) IV Intermittent every 8 hours  tamsulosin 0.4 milliGRAM(s) Oral at bedtime    MEDICATIONS  (PRN):  acetaminophen     Tablet .. 650 milliGRAM(s) Oral every 6 hours PRN Temp greater or equal to 38.5C (101.3F), Moderate Pain (4 - 6)    Allergies    No Known Allergies    Intolerances      Vital Signs Last 24 Hrs  T(C): 36.7 (20 Oct 2023 05:00), Max: 36.7 (20 Oct 2023 05:00)  T(F): 98 (20 Oct 2023 05:00), Max: 98 (20 Oct 2023 05:00)  HR: 50 (20 Oct 2023 05:00) (50 - 59)  BP: 130/56 (20 Oct 2023 05:00) (130/38 - 137/54)  BP(mean): --  RR: 17 (20 Oct 2023 05:00) (17 - 18)  SpO2: 94% (20 Oct 2023 05:00) (94% - 97%)    Parameters below as of 20 Oct 2023 05:00  Patient On (Oxygen Delivery Method): room air      I&O's Summary    19 Oct 2023 07:01  -  20 Oct 2023 07:00  --------------------------------------------------------  IN: 0 mL / OUT: 1450 mL / NET: -1450 mL          TELE: Afib with SVR  30-50's   PHYSICAL EXAM:  Constitutional: NAD, awake and alert  HEENT: Moist Mucous Membranes, Anicteric  Pulmonary: Non-labored, breath sounds diminished, No wheezing, rales or rhonchi  Cardiovascular: IRRR, S1 and S2, +systolic murmurs  Gastrointestinal: Bowel Sounds present, soft, nontender.   Lymph: No peripheral edema. No lymphadenopathy.  Skin: No visible rashes or ulcers.  Psych:  Mood & affect appropriate  LABS: All Labs Reviewed:                        10.4   8.39  )-----------( 285      ( 20 Oct 2023 07:10 )             31.1                         10.2   9.43  )-----------( 278      ( 19 Oct 2023 05:40 )             29.7                         11.3   11.93 )-----------( 318      ( 18 Oct 2023 12:17 )             33.4     20 Oct 2023 07:10    142    |  110    |  19     ----------------------------<  92     3.8     |  25     |  0.86   19 Oct 2023 18:15    139    |  105    |  25     ----------------------------<  111    3.5     |  26     |  1.00   19 Oct 2023 05:40    140    |  106    |  29     ----------------------------<  100    2.8     |  27     |  1.00     Ca    7.9        20 Oct 2023 07:10  Ca    8.1        19 Oct 2023 18:15  Ca    7.9        19 Oct 2023 05:40    TPro  6.9    /  Alb  2.9    /  TBili  1.1    /  DBili  x      /  AST  29     /  ALT  22     /  AlkPhos  79     18 Oct 2023 06:25    PT/INR - ( 20 Oct 2023 07:10 )   PT: 41.6 sec;   INR: 3.69 ratio               12 Lead ECG:   Ventricular Rate 54 BPM    QRS Duration 154 ms    Q-T Interval 498 ms    QTC Calculation(Bazett) 472 ms    R Axis 1 degrees    T Axis 37 degrees    Diagnosis Line Atrial fibrillation with slow ventricular response  Right bundle branch block  Voltage criteria for left ventricular hypertrophy  Abnormal ECG  Confirmed by anne Geller ([id number]) on 10/19/2023 2:29:09 PM (10-18-23 @ 21:23)

## 2023-10-20 NOTE — PROGRESS NOTE ADULT - SUBJECTIVE AND OBJECTIVE BOX
INTERVAL Hx:  No acute events.  Palomino draining clear, yellow urine.  On Flomax, Proscar for urinary retention.  Pt had BM today, first since admission.  Family (son, wife) at bedside.  Pt comfortable, w/o complaints.  On IV antibiotics for ?PNA.  Urine cultures negative.  Renal indices improved since palomino placement.    MEDICATIONS  (STANDING):  ascorbic acid 500 milliGRAM(s) Oral two times a day  dextrose 5% + sodium chloride 0.45%. 1000 milliLiter(s) (50 mL/Hr) IV Continuous <Continuous>  finasteride 5 milliGRAM(s) Oral daily  multivitamin/minerals 1 Tablet(s) Oral daily  piperacillin/tazobactam IVPB.. 3.375 Gram(s) IV Intermittent every 8 hours  tamsulosin 0.4 milliGRAM(s) Oral at bedtime    MEDICATIONS  (PRN):  acetaminophen     Tablet .. 650 milliGRAM(s) Oral every 6 hours PRN Temp greater or equal to 38.5C (101.3F), Moderate Pain (4 - 6)        Vital Signs Last 24 Hrs  T(C): 36.9 (20 Oct 2023 12:50), Max: 36.9 (20 Oct 2023 12:50)  T(F): 98.5 (20 Oct 2023 12:50), Max: 98.5 (20 Oct 2023 12:50)  HR: 50 (20 Oct 2023 12:50) (50 - 59)  BP: 145/50 (20 Oct 2023 12:50) (130/56 - 145/50)  BP(mean): --  RR: 18 (20 Oct 2023 12:50) (17 - 18)  SpO2: 95% (20 Oct 2023 12:50) (94% - 97%)    Parameters below as of 20 Oct 2023 12:50  Patient On (Oxygen Delivery Method): room air        PHYSICAL EXAM:    ABDOMEN: soft, NT    Palomino: draining clear, yellow urine    LABS:                        10.4   8.39  )-----------( 285      ( 20 Oct 2023 07:10 )             31.1     10-20    142  |  110<H>  |  19  ----------------------------<  92  3.8   |  25  |  0.86    Ca    7.9<L>      20 Oct 2023 07:10      Urine culture:  10-18 @ 20:15 --   No growth  Urine culture:  10-18 @ 06:35 --   No growth at 48 Hours  Urine culture:  10-18 @ 06:25 --   No growth at 48 Hours    RADIOLOGY & ADDITIONAL TESTS:  < from: CT Chest No Cont (10.18.23 @ 15:53) >  ACC: [id number] EXAM:  CT CHEST   ORDERED BY: JACKY JAIN     PROCEDURE DATE:  10/18/2023          INTERPRETATION:  CLINICAL INFORMATION: Weakness, fatigue. Recent COVID   infection.    COMPARISON: Chest x-ray 10/18/2023.    CONTRAST/COMPLICATIONS:  IV Contrast: NONE  Oral Contrast: NONE  Complications: None reported at time of study completion    PROCEDURE:  CT of the Chest was performed.  Sagittal and coronal reformats were performed.    FINDINGS:    LUNGS AND AIRWAYS: PLEURA: Hyperinflated lungs with multiple thin wall   cyst likely reflecting bleb/bullae.  There are secretions bilateral mainstem bronchi, the central airways are   patent.  There are bilateral lower lobe pleural/parenchymal airspace   consolidations. There are prominent bilateral calcified pleural plaques.  Possible trace right pleural effusion.    MEDIASTINUM AND DONNA: No lymphadenopathy.    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  Coronary arterycalcification.    HEART:  Cardiomegaly.  Aortic and mitral valvular calcifications.   No pericardial effusion.    CHEST WALL AND LOWER NECK: Within normal limits.    VISUALIZED UPPER ABDOMEN:  Possible wall thickening at the gastroesophageal junction.  Cholelithiasis.    BONES: Degenerative changes.    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    Cystic airspace disease likely reflecting bullae/blebs.    Possible wall thickening at the gastroesophageal junction.  Recommend further clinical correlation.    < end of copied text >

## 2023-10-20 NOTE — CASE MANAGEMENT PROGRESS NOTE - NSCMPROGRESSNOTE_GEN_ALL_CORE
discussed patient on rounds, remains acute, on IV Zosyn, has palomino. PT recommends [facility] v [facility]. CM to remain available.

## 2023-10-20 NOTE — PHYSICAL THERAPY INITIAL EVALUATION ADULT - PERTINENT HX OF CURRENT PROBLEM, REHAB EVAL
89-year-old male with a history of A-fib on coumadin,  HTN, macular degeneration , dx with COVID 10days ago, presents with weakness, cough, fatigue, dehydration, decreased p.o. intake, decreased urine output. RLE doppler: negative DVT.

## 2023-10-20 NOTE — PROGRESS NOTE ADULT - SUBJECTIVE AND OBJECTIVE BOX
Patient is a 89y old  Male who presents with a chief complaint of Pneumonia due to infectious organism     (19 Oct 2023 14:08)    Patient seen in follow up for BERNA[facility]TE.        PAST MEDICAL HISTORY:  HTN (hypertension)    Atrial fibrillation    Degeneration macular      MEDICATIONS  (STANDING):  ascorbic acid 500 milliGRAM(s) Oral two times a day  dextrose 5% + sodium chloride 0.45%. 1000 milliLiter(s) (50 mL/Hr) IV Continuous <Continuous>  finasteride 5 milliGRAM(s) Oral daily  multivitamin/minerals 1 Tablet(s) Oral daily  piperacillin/tazobactam IVPB.. 3.375 Gram(s) IV Intermittent every 8 hours  tamsulosin 0.4 milliGRAM(s) Oral at bedtime  warfarin 5 milliGRAM(s) Oral once    MEDICATIONS  (PRN):  acetaminophen     Tablet .. 650 milliGRAM(s) Oral every 6 hours PRN Temp greater or equal to 38.5C (101.3F), Moderate Pain (4 - 6)    T(C): 36.9 (10-20-23 @ 12:50), Max: 37.1 (10-18-23 @ 22:06)  HR: 50 (10-20-23 @ 12:50) (38 - 61)  BP: 145/50 (10-20-23 @ 12:50) (113/53 - 158/67)  RR: 18 (10-20-23 @ 12:50)  SpO2: 95% (10-20-23 @ 12:50)  Wt(kg): --  I&O's Detail    19 Oct 2023 07:01  -  20 Oct 2023 07:00  --------------------------------------------------------  IN:  Total IN: 0 mL    OUT:    Indwelling Catheter - Urethral (mL): 1450 mL  Total OUT: 1450 mL    Total NET: -1450 mL                  PHYSICAL EXAM:  General: No distress  Respiratory: b/l air entry  Cardiovascular: S1 S2  Gastrointestinal: soft  Extremities:  edema                          LABORATORY:                        10.4   8.39  )-----------( 285      ( 20 Oct 2023 07:10 )             31.1     10-20    142  |  110<H>  |  19  ----------------------------<  92  3.8   |  25  |  0.86    Ca    7.9<L>      20 Oct 2023 07:10      Sodium: 142 mmol/L (10-20 @ 07:10)  Sodium: 139 mmol/L (10-19 @ 18:15)  Sodium: 140 mmol/L (10-19 @ 05:40)    Potassium: 3.8 mmol/L (10-20 @ 07:10)  Potassium: 3.5 mmol/L (10-19 @ 18:15)  Potassium: 2.8 mmol/L (10-19 @ 05:40)    Hemoglobin: 10.4 g/dL (10-20 @ 07:10)  Hemoglobin: 10.2 g/dL (10-19 @ 05:40)  Hemoglobin: 11.3 g/dL (10-18 @ 12:17)  Hemoglobin: 12.0 g/dL (10-18 @ 07:55)    Creatinine, Serum 0.86 (10-20 @ 07:10)  Creatinine, Serum 1.00 (10-19 @ 18:15)  Creatinine, Serum 1.00 (10-19 @ 05:40)  Creatinine, Serum 1.60 (10-18 @ 06:25)          Urinalysis Basic - ( 20 Oct 2023 07:10 )    Color: x / Appearance: x / SG: x / pH: x  Gluc: 92 mg/dL / Ketone: x  / Bili: x / Urobili: x   Blood: x / Protein: x / Nitrite: x   Leuk Esterase: x / RBC: x / WBC x   Sq Epi: x / Non Sq Epi: x / Bacteria: x

## 2023-10-20 NOTE — PATIENT CHOICE NOTE. - NSPTCHOICESTATE_GEN_ALL_CORE

## 2023-10-20 NOTE — PROGRESS NOTE ADULT - SUBJECTIVE AND OBJECTIVE BOX
[facility], Division of Infectious Diseases  JIAN Carrizales Y. Patel, S. Shah, G. [facility]  [phone]    Name: GURINDER TURNER  Age: 89y  Gender: Male  MRN: [id number]    Interval History:  Patient seen and examined at bedside   No acute overnight events. Afebrile  No complaints  Notes reviewed    Antibiotics:  piperacillin/tazobactam IVPB.. 3.375 Gram(s) IV Intermittent every 8 hours      Medications:  acetaminophen     Tablet .. 650 milliGRAM(s) Oral every 6 hours PRN  ascorbic acid 500 milliGRAM(s) Oral two times a day  dextrose 5% + sodium chloride 0.45%. 1000 milliLiter(s) IV Continuous <Continuous>  finasteride 5 milliGRAM(s) Oral daily  multivitamin/minerals 1 Tablet(s) Oral daily  piperacillin/tazobactam IVPB.. 3.375 Gram(s) IV Intermittent every 8 hours  tamsulosin 0.4 milliGRAM(s) Oral at bedtime      Review of Systems:  A 10-point review of systems was obtained.   Review of systems otherwise negative except as previously noted.    Allergies: No Known Allergies    For details regarding the patient's past medical history, social history, family history, and other miscellaneous elements, please refer the initial infectious diseases consultation and/or the admitting history and physical examination for this admission.    Objective:  Vitals:   T(C): 36.9 (10-20-23 @ 12:50), Max: 36.9 (10-20-23 @ 12:50)  HR: 50 (10-20-23 @ 12:50) (50 - 59)  BP: 145/50 (10-20-23 @ 12:50) (130/56 - 145/50)  RR: 18 (10-20-23 @ 12:50) (17 - 18)  SpO2: 95% (10-20-23 @ 12:50) (94% - 97%)    Physical Examination:  General: no acute distress  HEENT: NC/AT, EOMI,  Cardio: S1, S2 heard, RRR, no murmurs  Resp: decreased b/l breath sounds  Abd: soft, NT, ND  Ext: no edema or cyanosis  Skin: warm, dry, no visible rash      Laboratory Studies:  CBC:                       10.4   8.39  )-----------( 285      ( 20 Oct 2023 07:10 )             31.1     CMP: 10-20    142  |  110<H>  |  19  ----------------------------<  92  3.8   |  25  |  0.86    Ca    7.9<L>      20 Oct 2023 07:10        Urinalysis Basic - ( 20 Oct 2023 07:10 )    Color: x / Appearance: x / SG: x / pH: x  Gluc: 92 mg/dL / Ketone: x  / Bili: x / Urobili: x   Blood: x / Protein: x / Nitrite: x   Leuk Esterase: x / RBC: x / WBC x   Sq Epi: x / Non Sq Epi: x / Bacteria: x        Microbiology: reviewed    Culture - Urine (collected 10-18-23 @ 20:15)  Source: Clean Catch Clean Catch (Midstream)  Final Report (10-19-23 @ 19:32):    No growth    Culture - Blood (collected 10-18-23 @ 06:35)  Source: .Blood Blood-Peripheral  Preliminary Report (10-20-23 @ 09:01):    No growth at 48 Hours    Culture - Blood (collected 10-18-23 @ 06:25)  Source: .Blood Blood-Peripheral  Preliminary Report (10-20-23 @ 09:01):    No growth at 48 Hours          Radiology: reviewed

## 2023-10-20 NOTE — SOCIAL WORK PROGRESS NOTE - NSSWPROGRESSNOTE_GEN_ALL_CORE
ASHU discussed PT recommendations of home PT vs [facility]. Pt dtr Shilpa has decided for pt to be dc to [facility]. PRAKASH requested. Pt dtr Shilpa is unsure of [facility] choices. ASHU educated pt dtr Shilpa re sending referrals to [facility] near pt's home and she was in agreement. ASHU will continue to follow.

## 2023-10-20 NOTE — PHYSICAL THERAPY INITIAL EVALUATION ADULT - ADDITIONAL COMMENTS
Pt lives w/ his spouse in a house, + few steps to enter/exit. Pt ambulates w/ RW and assist and requires assist for ADLs. pt has HHA 7xwk for 12 hrs daily

## 2023-10-20 NOTE — PROGRESS NOTE ADULT - SUBJECTIVE AND OBJECTIVE BOX
Patient is a 89y old  Male who presents with a chief complaint of Pneumonia due to infectious organism     (19 Oct 2023 14:08)      INTERVAL HPI/OVERNIGHT EVENTS: noted  pt seen and examined this am   events noted  feels well      Vital Signs Last 24 Hrs  T(C): 36.7 (20 Oct 2023 05:00), Max: 36.7 (20 Oct 2023 05:00)  T(F): 98 (20 Oct 2023 05:00), Max: 98 (20 Oct 2023 05:00)  HR: 50 (20 Oct 2023 05:00) (50 - 59)  BP: 130/56 (20 Oct 2023 05:00) (130/56 - 137/54)  BP(mean): --  RR: 17 (20 Oct 2023 05:00) (17 - 18)  SpO2: 94% (20 Oct 2023 05:00) (94% - 97%)    Parameters below as of 20 Oct 2023 05:00  Patient On (Oxygen Delivery Method): room air        acetaminophen     Tablet .. 650 milliGRAM(s) Oral every 6 hours PRN  ascorbic acid 500 milliGRAM(s) Oral two times a day  dextrose 5% + sodium chloride 0.45%. 1000 milliLiter(s) IV Continuous <Continuous>  finasteride 5 milliGRAM(s) Oral daily  multivitamin/minerals 1 Tablet(s) Oral daily  piperacillin/tazobactam IVPB.. 3.375 Gram(s) IV Intermittent every 8 hours  tamsulosin 0.4 milliGRAM(s) Oral at bedtime      PHYSICAL EXAM:  GENERAL: NAD,   EYES: conjunctiva and sclera clear  ENMT: Moist mucous membranes  NECK: Supple, No JVD, Normal thyroid  CHEST/LUNG: non labored, cta b/l  HEART: Regular rate and rhythm; No murmurs, rubs, or gallops  ABDOMEN: Soft, Nontender, Nondistended; Bowel sounds present  EXTREMITIES:  2+ Peripheral Pulses, No clubbing, cyanosis, or edema  LYMPH: No lymphadenopathy noted  SKIN: No rashes or lesions    Consultant(s) Notes Reviewed:  [x ] YES  [ ] NO  Care Discussed with Consultants/Other Providers [ x] YES  [ ] NO    LABS:                        10.4   8.39  )-----------( 285      ( 20 Oct 2023 07:10 )             31.1     10-20    142  |  110<H>  |  19  ----------------------------<  92  3.8   |  25  |  0.86    Ca    7.9<L>      20 Oct 2023 07:10      PT/INR - ( 20 Oct 2023 07:10 )   PT: 41.6 sec;   INR: 3.69 ratio           Urinalysis Basic - ( 20 Oct 2023 07:10 )    Color: x / Appearance: x / SG: x / pH: x  Gluc: 92 mg/dL / Ketone: x  / Bili: x / Urobili: x   Blood: x / Protein: x / Nitrite: x   Leuk Esterase: x / RBC: x / WBC x   Sq Epi: x / Non Sq Epi: x / Bacteria: x      CAPILLARY BLOOD GLUCOSE            Urinalysis Basic - ( 20 Oct 2023 07:10 )    Color: x / Appearance: x / SG: x / pH: x  Gluc: 92 mg/dL / Ketone: x  / Bili: x / Urobili: x   Blood: x / Protein: x / Nitrite: x   Leuk Esterase: x / RBC: x / WBC x   Sq Epi: x / Non Sq Epi: x / Bacteria: x        Culture - Urine (collected 18 Oct 2023 20:15)  Source: Clean Catch Clean Catch (Midstream)  Final Report (19 Oct 2023 19:32):    No growth    Culture - Blood (collected 18 Oct 2023 06:35)  Source: .Blood Blood-Peripheral  Preliminary Report (20 Oct 2023 09:01):    No growth at 48 Hours    Culture - Blood (collected 18 Oct 2023 06:25)  Source: .Blood Blood-Peripheral  Preliminary Report (20 Oct 2023 09:01):    No growth at 48 Hours        RADIOLOGY & ADDITIONAL TESTS:    Imaging Personally Reviewed:  [x ] YES  [ ] NO

## 2023-10-21 LAB
ANION GAP SERPL CALC-SCNC: 7 MMOL/L — SIGNIFICANT CHANGE UP (ref 5–17)
ANION GAP SERPL CALC-SCNC: 7 MMOL/L — SIGNIFICANT CHANGE UP (ref 5–17)
BUN SERPL-MCNC: 13 MG/DL — SIGNIFICANT CHANGE UP (ref 7–23)
BUN SERPL-MCNC: 13 MG/DL — SIGNIFICANT CHANGE UP (ref 7–23)
CALCIUM SERPL-MCNC: 8.2 MG/DL — LOW (ref 8.5–10.1)
CALCIUM SERPL-MCNC: 8.2 MG/DL — LOW (ref 8.5–10.1)
CHLORIDE SERPL-SCNC: 105 MMOL/L — SIGNIFICANT CHANGE UP (ref 96–108)
CHLORIDE SERPL-SCNC: 105 MMOL/L — SIGNIFICANT CHANGE UP (ref 96–108)
CO2 SERPL-SCNC: 27 MMOL/L — SIGNIFICANT CHANGE UP (ref 22–31)
CO2 SERPL-SCNC: 27 MMOL/L — SIGNIFICANT CHANGE UP (ref 22–31)
CREAT SERPL-MCNC: 0.75 MG/DL — SIGNIFICANT CHANGE UP (ref 0.5–1.3)
CREAT SERPL-MCNC: 0.75 MG/DL — SIGNIFICANT CHANGE UP (ref 0.5–1.3)
EGFR: 84 ML/MIN/1.73M2 — SIGNIFICANT CHANGE UP
EGFR: 84 ML/MIN/1.73M2 — SIGNIFICANT CHANGE UP
GLUCOSE SERPL-MCNC: 96 MG/DL — SIGNIFICANT CHANGE UP (ref 70–99)
GLUCOSE SERPL-MCNC: 96 MG/DL — SIGNIFICANT CHANGE UP (ref 70–99)
HCT VFR BLD CALC: 31.6 % — LOW (ref 39–50)
HCT VFR BLD CALC: 31.6 % — LOW (ref 39–50)
HGB BLD-MCNC: 10.6 G/DL — LOW (ref 13–17)
HGB BLD-MCNC: 10.6 G/DL — LOW (ref 13–17)
INR BLD: 3.82 RATIO — HIGH (ref 0.85–1.18)
INR BLD: 3.82 RATIO — HIGH (ref 0.85–1.18)
MCHC RBC-ENTMCNC: 31.8 PG — SIGNIFICANT CHANGE UP (ref 27–34)
MCHC RBC-ENTMCNC: 31.8 PG — SIGNIFICANT CHANGE UP (ref 27–34)
MCHC RBC-ENTMCNC: 33.5 GM/DL — SIGNIFICANT CHANGE UP (ref 32–36)
MCHC RBC-ENTMCNC: 33.5 GM/DL — SIGNIFICANT CHANGE UP (ref 32–36)
MCV RBC AUTO: 94.9 FL — SIGNIFICANT CHANGE UP (ref 80–100)
MCV RBC AUTO: 94.9 FL — SIGNIFICANT CHANGE UP (ref 80–100)
NRBC # BLD: 0 /100 WBCS — SIGNIFICANT CHANGE UP (ref 0–0)
NRBC # BLD: 0 /100 WBCS — SIGNIFICANT CHANGE UP (ref 0–0)
PLATELET # BLD AUTO: 319 K/UL — SIGNIFICANT CHANGE UP (ref 150–400)
PLATELET # BLD AUTO: 319 K/UL — SIGNIFICANT CHANGE UP (ref 150–400)
POTASSIUM SERPL-MCNC: 3.3 MMOL/L — LOW (ref 3.5–5.3)
POTASSIUM SERPL-MCNC: 3.3 MMOL/L — LOW (ref 3.5–5.3)
POTASSIUM SERPL-SCNC: 3.3 MMOL/L — LOW (ref 3.5–5.3)
POTASSIUM SERPL-SCNC: 3.3 MMOL/L — LOW (ref 3.5–5.3)
PROTHROM AB SERPL-ACNC: 43 SEC — HIGH (ref 9.5–13)
PROTHROM AB SERPL-ACNC: 43 SEC — HIGH (ref 9.5–13)
RBC # BLD: 3.33 M/UL — LOW (ref 4.2–5.8)
RBC # BLD: 3.33 M/UL — LOW (ref 4.2–5.8)
RBC # FLD: 13.3 % — SIGNIFICANT CHANGE UP (ref 10.3–14.5)
RBC # FLD: 13.3 % — SIGNIFICANT CHANGE UP (ref 10.3–14.5)
SODIUM SERPL-SCNC: 139 MMOL/L — SIGNIFICANT CHANGE UP (ref 135–145)
SODIUM SERPL-SCNC: 139 MMOL/L — SIGNIFICANT CHANGE UP (ref 135–145)
WBC # BLD: 8.23 K/UL — SIGNIFICANT CHANGE UP (ref 3.8–10.5)
WBC # BLD: 8.23 K/UL — SIGNIFICANT CHANGE UP (ref 3.8–10.5)
WBC # FLD AUTO: 8.23 K/UL — SIGNIFICANT CHANGE UP (ref 3.8–10.5)
WBC # FLD AUTO: 8.23 K/UL — SIGNIFICANT CHANGE UP (ref 3.8–10.5)

## 2023-10-21 PROCEDURE — 99232 SBSQ HOSP IP/OBS MODERATE 35: CPT

## 2023-10-21 RX ORDER — POTASSIUM CHLORIDE 20 MEQ
40 PACKET (EA) ORAL ONCE
Refills: 0 | Status: COMPLETED | OUTPATIENT
Start: 2023-10-21 | End: 2023-10-21

## 2023-10-21 RX ADMIN — Medication 1 TABLET(S): at 11:27

## 2023-10-21 RX ADMIN — Medication 500 MILLIGRAM(S): at 06:28

## 2023-10-21 RX ADMIN — FINASTERIDE 5 MILLIGRAM(S): 5 TABLET, FILM COATED ORAL at 11:28

## 2023-10-21 RX ADMIN — Medication 500 MILLIGRAM(S): at 17:32

## 2023-10-21 RX ADMIN — SODIUM CHLORIDE 50 MILLILITER(S): 9 INJECTION, SOLUTION INTRAVENOUS at 21:17

## 2023-10-21 RX ADMIN — PIPERACILLIN AND TAZOBACTAM 25 GRAM(S): 4; .5 INJECTION, POWDER, LYOPHILIZED, FOR SOLUTION INTRAVENOUS at 06:29

## 2023-10-21 RX ADMIN — TAMSULOSIN HYDROCHLORIDE 0.4 MILLIGRAM(S): 0.4 CAPSULE ORAL at 21:16

## 2023-10-21 RX ADMIN — PIPERACILLIN AND TAZOBACTAM 25 GRAM(S): 4; .5 INJECTION, POWDER, LYOPHILIZED, FOR SOLUTION INTRAVENOUS at 13:44

## 2023-10-21 RX ADMIN — PIPERACILLIN AND TAZOBACTAM 25 GRAM(S): 4; .5 INJECTION, POWDER, LYOPHILIZED, FOR SOLUTION INTRAVENOUS at 21:19

## 2023-10-21 RX ADMIN — Medication 40 MILLIEQUIVALENT(S): at 21:17

## 2023-10-21 NOTE — SOCIAL WORK PROGRESS NOTE - NSSWPROGRESSNOTE_GEN_ALL_CORE
Case discussed at through put and MDs state almost ready for discharge. Referrals were sent to sub-acute rehab yesterday and several facilities accepted patient. I sent request for SAT auth to [facility] today. Social work to follow.  Case discussed at through put and MDs state almost ready for discharge. Referrals were sent to sub-acute rehab yesterday and several facilities accepted patient. [facility]. Social work to follow.

## 2023-10-21 NOTE — PROGRESS NOTE ADULT - SUBJECTIVE AND OBJECTIVE BOX
Patient is a 89y old  Male who presents with a chief complaint of Pneumonia due to infectious organism     (19 Oct 2023 14:08)      INTERVAL HPI/OVERNIGHT EVENTS: noted  pt seen and examined this am   events noted  feels well      Vital Signs Last 24 Hrs  T(C): 36.8 (21 Oct 2023 11:58), Max: 36.8 (21 Oct 2023 04:56)  T(F): 98.2 (21 Oct 2023 11:58), Max: 98.2 (21 Oct 2023 04:56)  HR: 61 (21 Oct 2023 11:58) (51 - 61)  BP: 148/61 (21 Oct 2023 11:58) (136/49 - 148/61)  BP(mean): --  RR: 18 (21 Oct 2023 11:58) (18 - 18)  SpO2: 97% (21 Oct 2023 11:58) (95% - 97%)    Parameters below as of 21 Oct 2023 11:58  Patient On (Oxygen Delivery Method): room air        acetaminophen     Tablet .. 650 milliGRAM(s) Oral every 6 hours PRN  ascorbic acid 500 milliGRAM(s) Oral two times a day  dextrose 5% + sodium chloride 0.45%. 1000 milliLiter(s) IV Continuous <Continuous>  finasteride 5 milliGRAM(s) Oral daily  multivitamin/minerals 1 Tablet(s) Oral daily  piperacillin/tazobactam IVPB.. 3.375 Gram(s) IV Intermittent every 8 hours  tamsulosin 0.4 milliGRAM(s) Oral at bedtime      PHYSICAL EXAM:  GENERAL: NAD,   EYES: conjunctiva and sclera clear  ENMT: Moist mucous membranes  NECK: Supple, No JVD, Normal thyroid  CHEST/LUNG: non labored, cta b/l  HEART: Regular rate and rhythm; No murmurs, rubs, or gallops  ABDOMEN: Soft, Nontender, Nondistended; Bowel sounds present  EXTREMITIES:  2+ Peripheral Pulses, No clubbing, cyanosis, or edema  LYMPH: No lymphadenopathy noted  SKIN: No rashes or lesions    Consultant(s) Notes Reviewed:  [x ] YES  [ ] NO  Care Discussed with Consultants/Other Providers [ x] YES  [ ] NO    LABS:                        10.6   8.23  )-----------( 319      ( 21 Oct 2023 06:05 )             31.6     10-21    139  |  105  |  13  ----------------------------<  96  3.3<L>   |  27  |  0.75    Ca    8.2<L>      21 Oct 2023 06:05      PT/INR - ( 21 Oct 2023 06:05 )   PT: 43.0 sec;   INR: 3.82 ratio           Urinalysis Basic - ( 21 Oct 2023 06:05 )    Color: x / Appearance: x / SG: x / pH: x  Gluc: 96 mg/dL / Ketone: x  / Bili: x / Urobili: x   Blood: x / Protein: x / Nitrite: x   Leuk Esterase: x / RBC: x / WBC x   Sq Epi: x / Non Sq Epi: x / Bacteria: x      CAPILLARY BLOOD GLUCOSE            Urinalysis Basic - ( 21 Oct 2023 06:05 )    Color: x / Appearance: x / SG: x / pH: x  Gluc: 96 mg/dL / Ketone: x  / Bili: x / Urobili: x   Blood: x / Protein: x / Nitrite: x   Leuk Esterase: x / RBC: x / WBC x   Sq Epi: x / Non Sq Epi: x / Bacteria: x        Culture - Urine (collected 18 Oct 2023 20:15)  Source: Clean Catch Clean Catch (Midstream)  Final Report (19 Oct 2023 19:32):    No growth        RADIOLOGY & ADDITIONAL TESTS:    Imaging Personally Reviewed:  [x ] YES  [ ] NO

## 2023-10-21 NOTE — PROGRESS NOTE ADULT - SUBJECTIVE AND OBJECTIVE BOX
INTERVAL Hx:  No acute events.  Hematuria from traumatic palomino attempt has resolved.  Palomino in for urinary retention.  Renal indices continue to improve with catheterization.    MEDICATIONS  (STANDING):  ascorbic acid 500 milliGRAM(s) Oral two times a day  dextrose 5% + sodium chloride 0.45%. 1000 milliLiter(s) (50 mL/Hr) IV Continuous <Continuous>  finasteride 5 milliGRAM(s) Oral daily  multivitamin/minerals 1 Tablet(s) Oral daily  piperacillin/tazobactam IVPB.. 3.375 Gram(s) IV Intermittent every 8 hours  tamsulosin 0.4 milliGRAM(s) Oral at bedtime    MEDICATIONS  (PRN):  acetaminophen     Tablet .. 650 milliGRAM(s) Oral every 6 hours PRN Temp greater or equal to 38.5C (101.3F), Moderate Pain (4 - 6)        Vital Signs Last 24 Hrs  T(C): 36.8 (21 Oct 2023 04:56), Max: 36.9 (20 Oct 2023 12:50)  T(F): 98.2 (21 Oct 2023 04:56), Max: 98.5 (20 Oct 2023 12:50)  HR: 51 (21 Oct 2023 04:56) (50 - 51)  BP: 136/49 (21 Oct 2023 04:56) (136/49 - 145/50)  BP(mean): --  RR: 18 (21 Oct 2023 04:56) (18 - 18)  SpO2: 95% (21 Oct 2023 04:56) (95% - 95%)    Parameters below as of 21 Oct 2023 04:56  Patient On (Oxygen Delivery Method): room air        LABS:                        10.6   8.23  )-----------( 319      ( 21 Oct 2023 06:05 )             31.6     10-21    139  |  105  |  13  ----------------------------<  96  3.3<L>   |  27  |  0.75    Ca    8.2<L>      21 Oct 2023 06:05      Urine culture:  10-18 @ 20:15 --   No growth  Urine culture:  10-18 @ 06:35 --   No growth at 72 Hours  Urine culture:  10-18 @ 06:25 --   No growth at 72 Hours

## 2023-10-21 NOTE — PROGRESS NOTE ADULT - SUBJECTIVE AND OBJECTIVE BOX
[facility] -- Cayla Orta Pannella, Patel, Savella, Goodger, Cohen  Office # [phone]    Follow Up:  AF, elv trop   Subjective/Observations:  seen and examined, asleep, easily awaken in bed, denies chest pain, dyspnea, palpitations or dizziness, orthopnea and PND. Tolerating room air.     REVIEW OF SYSTEMS: All other review of systems is negative unless indicated above  PAST MEDICAL & SURGICAL HISTORY:  HTN (hypertension)      Atrial fibrillation      Degeneration macular        MEDICATIONS  (STANDING):  ascorbic acid 500 milliGRAM(s) Oral two times a day  dextrose 5% + sodium chloride 0.45%. 1000 milliLiter(s) (50 mL/Hr) IV Continuous <Continuous>  finasteride 5 milliGRAM(s) Oral daily  multivitamin/minerals 1 Tablet(s) Oral daily  piperacillin/tazobactam IVPB.. 3.375 Gram(s) IV Intermittent every 8 hours  tamsulosin 0.4 milliGRAM(s) Oral at bedtime    MEDICATIONS  (PRN):  acetaminophen     Tablet .. 650 milliGRAM(s) Oral every 6 hours PRN Temp greater or equal to 38.5C (101.3F), Moderate Pain (4 - 6)    Allergies    No Known Allergies    Intolerances      Vital Signs Last 24 Hrs  T(C): 36.8 (21 Oct 2023 04:56), Max: 36.9 (20 Oct 2023 12:50)  T(F): 98.2 (21 Oct 2023 04:56), Max: 98.5 (20 Oct 2023 12:50)  HR: 51 (21 Oct 2023 04:56) (50 - 51)  BP: 136/49 (21 Oct 2023 04:56) (136/49 - 145/50)  BP(mean): --  RR: 18 (21 Oct 2023 04:56) (18 - 18)  SpO2: 95% (21 Oct 2023 04:56) (95% - 95%)    Parameters below as of 21 Oct 2023 04:56  Patient On (Oxygen Delivery Method): room air      I&O's Summary    20 Oct 2023 07:01  -  21 Oct 2023 07:00  --------------------------------------------------------  IN: 0 mL / OUT: 800 mL / NET: -800 mL        TELE: Afib 50's <-> SR 60's   PHYSICAL EXAM:  Constitutional: NAD, awake and alert  HEENT: Moist Mucous Membranes, Anicteric  Pulmonary: Non-labored, breath sounds diminished, No wheezing, rales or rhonchi  Cardiovascular: IRRR, S1 and S2, +systolic murmurs  Gastrointestinal: Bowel Sounds present, soft, nontender.   Lymph: No peripheral edema. No lymphadenopathy.  Skin: No visible rashes or ulcers.  Psych:  Mood & affect appropriate  LABS: All Labs Reviewed:                        10.6 8.23  )-----------( 319      ( 21 Oct 2023 06:05 )             31.6                         10.4   8.39  )-----------( 285      ( 20 Oct 2023 07:10 )             31.1                         10.2   9.43  )-----------( 278      ( 19 Oct 2023 05:40 )             29.7     21 Oct 2023 06:05    139    |  105    |  13     ----------------------------<  96     3.3     |  27     |  0.75   20 Oct 2023 07:10    142    |  110    |  19     ----------------------------<  92     3.8     |  25     |  0.86   19 Oct 2023 18:15    139    |  105    |  25     ----------------------------<  111    3.5     |  26     |  1.00     Ca    8.2        21 Oct 2023 06:05  Ca    7.9        20 Oct 2023 07:10  Ca    8.1        19 Oct 2023 18:15      PT/INR - ( 21 Oct 2023 06:05 )   PT: 43.0 sec;   INR: 3.82 ratio               12 Lead ECG:   Ventricular Rate 54 BPM    QRS Duration 154 ms    Q-T Interval 498 ms    QTC Calculation(Bazett) 472 ms    R Axis 1 degrees    T Axis 37 degrees    Diagnosis Line Atrial fibrillation with slow ventricular response  Right bundle branch block  Voltage criteria for left ventricular hypertrophy  Abnormal ECG  Confirmed by anne Geller ([id number]) on 10/19/2023 2:29:09 PM (10-18-23 @ 21:23)

## 2023-10-22 LAB
ANION GAP SERPL CALC-SCNC: 7 MMOL/L — SIGNIFICANT CHANGE UP (ref 5–17)
ANION GAP SERPL CALC-SCNC: 7 MMOL/L — SIGNIFICANT CHANGE UP (ref 5–17)
BUN SERPL-MCNC: 12 MG/DL — SIGNIFICANT CHANGE UP (ref 7–23)
BUN SERPL-MCNC: 12 MG/DL — SIGNIFICANT CHANGE UP (ref 7–23)
CALCIUM SERPL-MCNC: 8.4 MG/DL — LOW (ref 8.5–10.1)
CALCIUM SERPL-MCNC: 8.4 MG/DL — LOW (ref 8.5–10.1)
CHLORIDE SERPL-SCNC: 109 MMOL/L — HIGH (ref 96–108)
CHLORIDE SERPL-SCNC: 109 MMOL/L — HIGH (ref 96–108)
CO2 SERPL-SCNC: 25 MMOL/L — SIGNIFICANT CHANGE UP (ref 22–31)
CO2 SERPL-SCNC: 25 MMOL/L — SIGNIFICANT CHANGE UP (ref 22–31)
CREAT SERPL-MCNC: 0.71 MG/DL — SIGNIFICANT CHANGE UP (ref 0.5–1.3)
CREAT SERPL-MCNC: 0.71 MG/DL — SIGNIFICANT CHANGE UP (ref 0.5–1.3)
EGFR: 86 ML/MIN/1.73M2 — SIGNIFICANT CHANGE UP
EGFR: 86 ML/MIN/1.73M2 — SIGNIFICANT CHANGE UP
GLUCOSE SERPL-MCNC: 82 MG/DL — SIGNIFICANT CHANGE UP (ref 70–99)
GLUCOSE SERPL-MCNC: 82 MG/DL — SIGNIFICANT CHANGE UP (ref 70–99)
HCT VFR BLD CALC: 32.3 % — LOW (ref 39–50)
HCT VFR BLD CALC: 32.3 % — LOW (ref 39–50)
HGB BLD-MCNC: 10.7 G/DL — LOW (ref 13–17)
HGB BLD-MCNC: 10.7 G/DL — LOW (ref 13–17)
INR BLD: 3.51 RATIO — HIGH (ref 0.85–1.18)
INR BLD: 3.51 RATIO — HIGH (ref 0.85–1.18)
MCHC RBC-ENTMCNC: 31.1 PG — SIGNIFICANT CHANGE UP (ref 27–34)
MCHC RBC-ENTMCNC: 31.1 PG — SIGNIFICANT CHANGE UP (ref 27–34)
MCHC RBC-ENTMCNC: 33.1 GM/DL — SIGNIFICANT CHANGE UP (ref 32–36)
MCHC RBC-ENTMCNC: 33.1 GM/DL — SIGNIFICANT CHANGE UP (ref 32–36)
MCV RBC AUTO: 93.9 FL — SIGNIFICANT CHANGE UP (ref 80–100)
MCV RBC AUTO: 93.9 FL — SIGNIFICANT CHANGE UP (ref 80–100)
NRBC # BLD: 0 /100 WBCS — SIGNIFICANT CHANGE UP (ref 0–0)
NRBC # BLD: 0 /100 WBCS — SIGNIFICANT CHANGE UP (ref 0–0)
PLATELET # BLD AUTO: 324 K/UL — SIGNIFICANT CHANGE UP (ref 150–400)
PLATELET # BLD AUTO: 324 K/UL — SIGNIFICANT CHANGE UP (ref 150–400)
POTASSIUM SERPL-MCNC: 4.2 MMOL/L — SIGNIFICANT CHANGE UP (ref 3.5–5.3)
POTASSIUM SERPL-MCNC: 4.2 MMOL/L — SIGNIFICANT CHANGE UP (ref 3.5–5.3)
POTASSIUM SERPL-SCNC: 4.2 MMOL/L — SIGNIFICANT CHANGE UP (ref 3.5–5.3)
POTASSIUM SERPL-SCNC: 4.2 MMOL/L — SIGNIFICANT CHANGE UP (ref 3.5–5.3)
PROTHROM AB SERPL-ACNC: 39.6 SEC — HIGH (ref 9.5–13)
PROTHROM AB SERPL-ACNC: 39.6 SEC — HIGH (ref 9.5–13)
RBC # BLD: 3.44 M/UL — LOW (ref 4.2–5.8)
RBC # BLD: 3.44 M/UL — LOW (ref 4.2–5.8)
RBC # FLD: 13.2 % — SIGNIFICANT CHANGE UP (ref 10.3–14.5)
RBC # FLD: 13.2 % — SIGNIFICANT CHANGE UP (ref 10.3–14.5)
SODIUM SERPL-SCNC: 141 MMOL/L — SIGNIFICANT CHANGE UP (ref 135–145)
SODIUM SERPL-SCNC: 141 MMOL/L — SIGNIFICANT CHANGE UP (ref 135–145)
WBC # BLD: 8.4 K/UL — SIGNIFICANT CHANGE UP (ref 3.8–10.5)
WBC # BLD: 8.4 K/UL — SIGNIFICANT CHANGE UP (ref 3.8–10.5)
WBC # FLD AUTO: 8.4 K/UL — SIGNIFICANT CHANGE UP (ref 3.8–10.5)
WBC # FLD AUTO: 8.4 K/UL — SIGNIFICANT CHANGE UP (ref 3.8–10.5)

## 2023-10-22 PROCEDURE — 99232 SBSQ HOSP IP/OBS MODERATE 35: CPT

## 2023-10-22 RX ORDER — LATANOPROST 0.05 MG/ML
1 SOLUTION/ DROPS OPHTHALMIC; TOPICAL AT BEDTIME
Refills: 0 | Status: DISCONTINUED | OUTPATIENT
Start: 2023-10-22 | End: 2023-10-23

## 2023-10-22 RX ADMIN — Medication 500 MILLIGRAM(S): at 06:03

## 2023-10-22 RX ADMIN — PIPERACILLIN AND TAZOBACTAM 25 GRAM(S): 4; .5 INJECTION, POWDER, LYOPHILIZED, FOR SOLUTION INTRAVENOUS at 05:59

## 2023-10-22 RX ADMIN — FINASTERIDE 5 MILLIGRAM(S): 5 TABLET, FILM COATED ORAL at 11:52

## 2023-10-22 RX ADMIN — PIPERACILLIN AND TAZOBACTAM 25 GRAM(S): 4; .5 INJECTION, POWDER, LYOPHILIZED, FOR SOLUTION INTRAVENOUS at 21:38

## 2023-10-22 RX ADMIN — Medication 1 TABLET(S): at 11:52

## 2023-10-22 RX ADMIN — PIPERACILLIN AND TAZOBACTAM 25 GRAM(S): 4; .5 INJECTION, POWDER, LYOPHILIZED, FOR SOLUTION INTRAVENOUS at 14:00

## 2023-10-22 RX ADMIN — LATANOPROST 1 DROP(S): 0.05 SOLUTION/ DROPS OPHTHALMIC; TOPICAL at 21:38

## 2023-10-22 RX ADMIN — TAMSULOSIN HYDROCHLORIDE 0.4 MILLIGRAM(S): 0.4 CAPSULE ORAL at 21:40

## 2023-10-22 NOTE — PROGRESS NOTE ADULT - SUBJECTIVE AND OBJECTIVE BOX
Patient is a 89y old  Male who presents with a chief complaint of Pneumonia due to infectious organism     (19 Oct 2023 14:08)      INTERVAL HPI/OVERNIGHT EVENTS: noted  pt seen and examined this am   events noted  feels well      Vital Signs Last 24 Hrs  T(C): 36.6 (22 Oct 2023 11:37), Max: 36.7 (22 Oct 2023 05:05)  T(F): 97.9 (22 Oct 2023 11:37), Max: 98 (22 Oct 2023 05:05)  HR: 51 (22 Oct 2023 11:37) (50 - 53)  BP: 161/61 (22 Oct 2023 11:37) (147/58 - 161/61)  BP(mean): --  RR: 17 (22 Oct 2023 11:37) (17 - 18)  SpO2: 91% (22 Oct 2023 11:37) (91% - 98%)    Parameters below as of 22 Oct 2023 11:37  Patient On (Oxygen Delivery Method): room air        acetaminophen     Tablet .. 650 milliGRAM(s) Oral every 6 hours PRN  ascorbic acid 500 milliGRAM(s) Oral two times a day  finasteride 5 milliGRAM(s) Oral daily  latanoprost 0.005% Ophthalmic Solution 1 Drop(s) Both EYES at bedtime  multivitamin/minerals 1 Tablet(s) Oral daily  piperacillin/tazobactam IVPB.. 3.375 Gram(s) IV Intermittent every 8 hours  tamsulosin 0.4 milliGRAM(s) Oral at bedtime      PHYSICAL EXAM:  GENERAL: NAD,   EYES: conjunctiva and sclera clear  ENMT: Moist mucous membranes  NECK: Supple, No JVD, Normal thyroid  CHEST/LUNG: non labored, cta b/l  HEART: Regular rate and rhythm; No murmurs, rubs, or gallops  ABDOMEN: Soft, Nontender, Nondistended; Bowel sounds present  EXTREMITIES:  2+ Peripheral Pulses, No clubbing, cyanosis, or edema  LYMPH: No lymphadenopathy noted  SKIN: No rashes or lesions    Consultant(s) Notes Reviewed:  [x ] YES  [ ] NO  Care Discussed with Consultants/Other Providers [ x] YES  [ ] NO    LABS:                        10.7   8.40  )-----------( 324      ( 22 Oct 2023 05:45 )             32.3     10-22    141  |  109<H>  |  12  ----------------------------<  82  4.2   |  25  |  0.71    Ca    8.4<L>      22 Oct 2023 05:45      PT/INR - ( 22 Oct 2023 05:45 )   PT: 39.6 sec;   INR: 3.51 ratio           Urinalysis Basic - ( 22 Oct 2023 05:45 )    Color: x / Appearance: x / SG: x / pH: x  Gluc: 82 mg/dL / Ketone: x  / Bili: x / Urobili: x   Blood: x / Protein: x / Nitrite: x   Leuk Esterase: x / RBC: x / WBC x   Sq Epi: x / Non Sq Epi: x / Bacteria: x      CAPILLARY BLOOD GLUCOSE            Urinalysis Basic - ( 22 Oct 2023 05:45 )    Color: x / Appearance: x / SG: x / pH: x  Gluc: 82 mg/dL / Ketone: x  / Bili: x / Urobili: x   Blood: x / Protein: x / Nitrite: x   Leuk Esterase: x / RBC: x / WBC x   Sq Epi: x / Non Sq Epi: x / Bacteria: x          RADIOLOGY & ADDITIONAL TESTS:    Imaging Personally Reviewed:  [x ] YES  [ ] NO

## 2023-10-22 NOTE — PROGRESS NOTE ADULT - SUBJECTIVE AND OBJECTIVE BOX
[facility] -- Cayla Orta, Jacky Ashraf Savella, Goodger, Cohen  Office # [phone]    Follow Up:  AF, elv trop     Subjective/Observations:  seen and examined, asleep, easily awaken in bed, denies chest pain, dyspnea, palpitations or dizziness, orthopnea and PND. Tolerating room air.       REVIEW OF SYSTEMS: All other review of systems is negative unless indicated above  PAST MEDICAL & SURGICAL HISTORY:  HTN (hypertension)      Atrial fibrillation      Degeneration macular        MEDICATIONS  (STANDING):  ascorbic acid 500 milliGRAM(s) Oral two times a day  dextrose 5% + sodium chloride 0.45%. 1000 milliLiter(s) (50 mL/Hr) IV Continuous <Continuous>  finasteride 5 milliGRAM(s) Oral daily  multivitamin/minerals 1 Tablet(s) Oral daily  piperacillin/tazobactam IVPB.. 3.375 Gram(s) IV Intermittent every 8 hours  tamsulosin 0.4 milliGRAM(s) Oral at bedtime    MEDICATIONS  (PRN):  acetaminophen     Tablet .. 650 milliGRAM(s) Oral every 6 hours PRN Temp greater or equal to 38.5C (101.3F), Moderate Pain (4 - 6)    Allergies    No Known Allergies    Intolerances      Vital Signs Last 24 Hrs  T(C): 36.7 (22 Oct 2023 05:05), Max: 36.8 (21 Oct 2023 11:58)  T(F): 98 (22 Oct 2023 05:05), Max: 98.2 (21 Oct 2023 11:58)  HR: 50 (22 Oct 2023 05:05) (50 - 61)  BP: 156/60 (22 Oct 2023 05:05) (147/58 - 156/60)  BP(mean): --  RR: 18 (22 Oct 2023 05:05) (18 - 18)  SpO2: 98% (22 Oct 2023 05:05) (97% - 98%)    Parameters below as of 22 Oct 2023 05:05  Patient On (Oxygen Delivery Method): room air      I&O's Summary    21 Oct 2023 07:01  -  22 Oct 2023 07:00  --------------------------------------------------------  IN: 1795 mL / OUT: 152 mL / NET: 1643 mL          TELE:   PHYSICAL EXAM:  Constitutional: NAD, awake and alert  HEENT: Moist Mucous Membranes, Anicteric  Pulmonary: Non-labored, breath sounds are clear bilaterally, No wheezing, rales or rhonchi  Cardiovascular: IRRRegular, S1 and S2, No murmurs, rubs, gallops or clicks  Gastrointestinal: Bowel Sounds present, soft, nontender.   Lymph: No peripheral edema. No lymphadenopathy.  Skin: No visible rashes or ulcers.  Psych:  Mood & affect appropriate  LABS: All Labs Reviewed:                        10.7   8.40  )-----------( 324      ( 22 Oct 2023 05:45 )             32.3                         10.6   8.23  )-----------( 319      ( 21 Oct 2023 06:05 )             31.6                         10.4   8.39  )-----------( 285      ( 20 Oct 2023 07:10 )             31.1     21 Oct 2023 06:05    139    |  105    |  13     ----------------------------<  96     3.3     |  27     |  0.75   20 Oct 2023 07:10    142    |  110    |  19     ----------------------------<  92     3.8     |  25     |  0.86   19 Oct 2023 18:15    139    |  105    |  25     ----------------------------<  111    3.5     |  26     |  1.00     Ca    8.2        21 Oct 2023 06:05  Ca    7.9        20 Oct 2023 07:10  Ca    8.1        19 Oct 2023 18:15      PT/INR - ( 22 Oct 2023 05:45 )   PT: 39.6 sec;   INR: 3.51 ratio               12 Lead ECG:   Ventricular Rate 54 BPM    QRS Duration 154 ms    Q-T Interval 498 ms    QTC Calculation(Bazett) 472 ms    R Axis 1 degrees    T Axis 37 degrees    Diagnosis Line Atrial fibrillation with slow ventricular response  Right bundle branch block  Voltage criteria for left ventricular hypertrophy  Abnormal ECG  Confirmed by anne Geller ([id number]) on 10/19/2023 2:29:09 PM (10-18-23 @ 21:23)        [facility] -- Cayla Orta, Jacky Ashraf Savella, Goodger, Cohen  Office # [phone]    Follow Up:  AF, elv trop     Subjective/Observations:  seen and examined, asleep, easily awaken in bed, denies chest pain, dyspnea, palpitations or dizziness, orthopnea and PND. Tolerating room air.       REVIEW OF SYSTEMS: All other review of systems is negative unless indicated above  PAST MEDICAL & SURGICAL HISTORY:  HTN (hypertension)      Atrial fibrillation      Degeneration macular        MEDICATIONS  (STANDING):  ascorbic acid 500 milliGRAM(s) Oral two times a day  dextrose 5% + sodium chloride 0.45%. 1000 milliLiter(s) (50 mL/Hr) IV Continuous <Continuous>  finasteride 5 milliGRAM(s) Oral daily  multivitamin/minerals 1 Tablet(s) Oral daily  piperacillin/tazobactam IVPB.. 3.375 Gram(s) IV Intermittent every 8 hours  tamsulosin 0.4 milliGRAM(s) Oral at bedtime    MEDICATIONS  (PRN):  acetaminophen     Tablet .. 650 milliGRAM(s) Oral every 6 hours PRN Temp greater or equal to 38.5C (101.3F), Moderate Pain (4 - 6)    Allergies    No Known Allergies    Intolerances      Vital Signs Last 24 Hrs  T(C): 36.7 (22 Oct 2023 05:05), Max: 36.8 (21 Oct 2023 11:58)  T(F): 98 (22 Oct 2023 05:05), Max: 98.2 (21 Oct 2023 11:58)  HR: 50 (22 Oct 2023 05:05) (50 - 61)  BP: 156/60 (22 Oct 2023 05:05) (147/58 - 156/60)  BP(mean): --  RR: 18 (22 Oct 2023 05:05) (18 - 18)  SpO2: 98% (22 Oct 2023 05:05) (97% - 98%)    Parameters below as of 22 Oct 2023 05:05  Patient On (Oxygen Delivery Method): room air      I&O's Summary    21 Oct 2023 07:01  -  22 Oct 2023 07:00  --------------------------------------------------------  IN: 1795 mL / OUT: 152 mL / NET: 1643 mL        TELE: Afib 30's- 40's   PHYSICAL EXAM:  Constitutional: NAD, awake and alert  HEENT: Moist Mucous Membranes, Anicteric  Pulmonary: Non-labored, breath sounds diminished, No wheezing, rales or rhonchi  Cardiovascular: IRRR, S1 and S2, +systolic murmurs  Gastrointestinal: Bowel Sounds present, soft, nontender.   Lymph: No peripheral edema. No lymphadenopathy.  Skin: No visible rashes or ulcers.  Psych:  Mood & affect appropriate  LABS: All Labs Reviewed:                        10.7   8.40  )-----------( 324      ( 22 Oct 2023 05:45 )             32.3                         10.6   8.23  )-----------( 319      ( 21 Oct 2023 06:05 )             31.6                         10.4   8.39  )-----------( 285      ( 20 Oct 2023 07:10 )             31.1     21 Oct 2023 06:05    139    |  105    |  13     ----------------------------<  96     3.3     |  27     |  0.75   20 Oct 2023 07:10    142    |  110    |  19     ----------------------------<  92     3.8     |  25     |  0.86   19 Oct 2023 18:15    139    |  105    |  25     ----------------------------<  111    3.5     |  26     |  1.00     Ca    8.2        21 Oct 2023 06:05  Ca    7.9        20 Oct 2023 07:10  Ca    8.1        19 Oct 2023 18:15      PT/INR - ( 22 Oct 2023 05:45 )   PT: 39.6 sec;   INR: 3.51 ratio               12 Lead ECG:   Ventricular Rate 54 BPM    QRS Duration 154 ms    Q-T Interval 498 ms    QTC Calculation(Bazett) 472 ms    R Axis 1 degrees    T Axis 37 degrees    Diagnosis Line Atrial fibrillation with slow ventricular response  Right bundle branch block  Voltage criteria for left ventricular hypertrophy  Abnormal ECG  Confirmed by anne Geller [id number]) on 10/19/2023 2:29:09 PM (10-18-23 @ 21:23)

## 2023-10-22 NOTE — CHART NOTE - NSCHARTNOTEFT_GEN_A_CORE
Assessment: Pt seen for nutrition follow-up. Chart reviewed, hospital course noted.    Brief hx: Pt is a "89-year-old male with a history of A-fib on coumadin,  HTN, macular degeneration , dx with COVID 10days ago, presents with weakness, cough, fatigue, dehydration, decreased p.o. intake, decreased urine output."    Visited pt at bedside this am. Pt reports fair appetite/intake. Observed pt consume ~75% of breakfast meal this am. Had a few sips of ensure supplement. PO intakes % per nursing documentation. Assisted with feeding. Tolerating diet well. Denies N/V/D/C. +BM 10/21. Pt currently on regular diet. Diet office aware to provide softer foods and cut-up protein. Additional food preferences previously obtained from family to optimize pt's po intake/tolerance. Will continue to provide ensure plus HP BID. Receiving IVFs; D5+NaCl@50ml/hr. Recommend continued assistance/encouragement at meal times.     Factors impacting intake: [X] none [ ] nausea  [ ] vomiting [ ] diarrhea [ ] constipation  [ ]chewing problems [ ] swallowing issues  [ ] other:     Diet Prescription: Diet, Regular:   Supplement Feeding Modality:  Oral  Ensure Plus High Protein Cans or Servings Per Day:  1       Frequency:  Two Times a day (10-19-23 @ 14:25)    Intake: good    Current Weight: Weight (kg): 68 (10-18 @ 06:06), 10/22 138.8# (no edema noted)  % Weight Change    Pertinent Medications: MEDICATIONS  (STANDING):  ascorbic acid 500 milliGRAM(s) Oral two times a day  dextrose 5% + sodium chloride 0.45%. 1000 milliLiter(s) (50 mL/Hr) IV Continuous <Continuous>  finasteride 5 milliGRAM(s) Oral daily  multivitamin/minerals 1 Tablet(s) Oral daily  piperacillin/tazobactam IVPB.. 3.375 Gram(s) IV Intermittent every 8 hours  tamsulosin 0.4 milliGRAM(s) Oral at bedtime    MEDICATIONS  (PRN):  acetaminophen     Tablet .. 650 milliGRAM(s) Oral every 6 hours PRN Temp greater or equal to 38.5C (101.3F), Moderate Pain (4 - 6)    Pertinent Labs: 10-22 Na141 mmol/L Glu 82 mg/dL K+ 4.2 mmol/L Cr  0.71 mg/dL BUN 12 mg/dL 10-18 Alb 2.9 g/dL<L>    Skin: pressure ulcers; stage I/II to sacrum, stage I to L buttocks, BL heels, L elbow    Estimated Needs:   [X] no change since previous assessment: based on CBW on admit 130#/58.9kg  25-30kcal/kg (1472-1767kcal)  1.2-1.4g pro/kg (70-82gm protein)  [ ] recalculated:     Previous Nutrition Diagnosis:   [X] Increased Nutrient Needs (kcal/protein, vit/micronutrients)  [X] Malnutrition (severe, chronic)    Nutrition Diagnosis is [X] ongoing  [ ] resolved [ ] not applicable     New Nutrition Diagnosis: [X] not applicable     Interventions:   Recommend  [ ] Change Diet To:  [ ] Nutrition Supplement  [ ] Nutrition Support  [X] Other: Continue current diet as tolerated; regular diet with ensure plus HP bid  Recommend MVI daily, continue Vit C 500mg bid    Monitoring and Evaluation:   [X] PO intake [ x ] Tolerance to diet prescription [ x ] weights [ x ] labs[ x ] follow up per protocol  [X] other: s/s GI distress, bowel function, skin integrity/ edema Assessment: Pt seen for nutrition follow-up. Chart reviewed, hospital course noted.    Brief hx: Pt is a "89-year-old male with a history of A-fib on coumadin,  HTN, macular degeneration , dx with COVID 10days ago, presents with weakness, cough, fatigue, dehydration, decreased p.o. intake, decreased urine output."    Visited pt at bedside this am. Pt reports fair appetite/intake. Observed pt consume ~75% of breakfast meal this am. Had a few sips of ensure supplement. PO intakes % per nursing documentation. Assisted with feeding. Tolerating diet well. Denies N/V/D/C. +BM 10/21. Pt currently on regular diet. Diet office aware to provide softer foods and cut-up protein. Additional food preferences previously obtained from family to optimize pt's po intake/tolerance. Will continue to provide ensure plus HP BID. Receiving IVFs; D5+NaCl@50ml/hr. Recommend continued assistance/encouragement at meal times.     Factors impacting intake: [X] none [ ] nausea  [ ] vomiting [ ] diarrhea [ ] constipation  [ ]chewing problems [ ] swallowing issues  [ ] other:     Diet Prescription: Diet, Regular:   Supplement Feeding Modality:  Oral  Ensure Plus High Protein Cans or Servings Per Day:  1       Frequency:  Two Times a day (10-19-23 @ 14:25)    Intake: good    Current Weight: Weight (kg): 68 (10-18 @ 06:06), 10/22 138.8# (no edema noted)  % Weight Change    Pertinent Medications: MEDICATIONS  (STANDING):  ascorbic acid 500 milliGRAM(s) Oral two times a day  dextrose 5% + sodium chloride 0.45%. 1000 milliLiter(s) (50 mL/Hr) IV Continuous <Continuous>  finasteride 5 milliGRAM(s) Oral daily  multivitamin/minerals 1 Tablet(s) Oral daily  piperacillin/tazobactam IVPB.. 3.375 Gram(s) IV Intermittent every 8 hours  tamsulosin 0.4 milliGRAM(s) Oral at bedtime    MEDICATIONS  (PRN):  acetaminophen     Tablet .. 650 milliGRAM(s) Oral every 6 hours PRN Temp greater or equal to 38.5C (101.3F), Moderate Pain (4 - 6)    Pertinent Labs: 10-22 Na141 mmol/L Glu 82 mg/dL K+ 4.2 mmol/L Cr  0.71 mg/dL BUN 12 mg/dL 10-18 Alb 2.9 g/dL<L>    Skin: pressure ulcers; stage I/II to sacrum, stage I to L buttocks, BL heels, L elbow    Estimated Needs:   [X] no change since previous assessment: based on CBW on admit 130#/58.9kg  25-30kcal/kg (1472-1767kcal)  1.2-1.4g pro/kg (70-82gm protein)  [ ] recalculated:     Previous Nutrition Diagnosis:   [X] Increased Nutrient Needs (kcal/protein, vit/micronutrients)  [X] Malnutrition (severe, chronic)    Nutrition Diagnosis is [X] ongoing  [ ] resolved [ ] not applicable     New Nutrition Diagnosis: [X] not applicable     Interventions:   Recommend  [ ] Change Diet To:  [ ] Nutrition Supplement  [ ] Nutrition Support  [X] Other: Continue current diet as tolerated; regular diet with ensure plus HP bid  Continue MVI/minerals daily and Vit C 500mg bid    Monitoring and Evaluation:   [X] PO intake [ x ] Tolerance to diet prescription [ x ] weights [ x ] labs[ x ] follow up per protocol  [X] other: s/s GI distress, bowel function, skin integrity/ edema

## 2023-10-22 NOTE — PROGRESS NOTE ADULT - SUBJECTIVE AND OBJECTIVE BOX
INTERVAL Hx:  Craig removed at HS last night.  Pt has not been able to void.    MEDICATIONS  (STANDING):  ascorbic acid 500 milliGRAM(s) Oral two times a day  dextrose 5% + sodium chloride 0.45%. 1000 milliLiter(s) (50 mL/Hr) IV Continuous <Continuous>  finasteride 5 milliGRAM(s) Oral daily  multivitamin/minerals 1 Tablet(s) Oral daily  piperacillin/tazobactam IVPB.. 3.375 Gram(s) IV Intermittent every 8 hours  tamsulosin 0.4 milliGRAM(s) Oral at bedtime    MEDICATIONS  (PRN):  acetaminophen     Tablet .. 650 milliGRAM(s) Oral every 6 hours PRN Temp greater or equal to 38.5C (101.3F), Moderate Pain (4 - 6)        Vital Signs Last 24 Hrs  T(C): 36.7 (22 Oct 2023 05:05), Max: 36.8 (21 Oct 2023 11:58)  T(F): 98 (22 Oct 2023 05:05), Max: 98.2 (21 Oct 2023 11:58)  HR: 50 (22 Oct 2023 05:05) (50 - 61)  BP: 156/60 (22 Oct 2023 05:05) (147/58 - 156/60)  BP(mean): --  RR: 18 (22 Oct 2023 05:05) (18 - 18)  SpO2: 98% (22 Oct 2023 05:05) (97% - 98%)    Parameters below as of 22 Oct 2023 05:05  Patient On (Oxygen Delivery Method): room air        PHYSICAL EXAM:    ABDOMEN: mild suprapubic distention and tenderness    Craig: [place] cath drainage bag dry    LABS:                        10.7   8.40  )-----------( 324      ( 22 Oct 2023 05:45 )             32.3     10-22    141  |  109<H>  |  12  ----------------------------<  82  4.2   |  25  |  0.71    Ca    8.4<L>      22 Oct 2023 05:45      Urine culture:  10-18 @ 20:15 --   No growth

## 2023-10-23 ENCOUNTER — TRANSCRIPTION ENCOUNTER (OUTPATIENT)
Age: 88
End: 2023-10-23

## 2023-10-23 VITALS
TEMPERATURE: 98 F | HEART RATE: 60 BPM | DIASTOLIC BLOOD PRESSURE: 56 MMHG | OXYGEN SATURATION: 98 % | RESPIRATION RATE: 18 BRPM | SYSTOLIC BLOOD PRESSURE: 181 MMHG

## 2023-10-23 LAB
ANION GAP SERPL CALC-SCNC: 7 MMOL/L — SIGNIFICANT CHANGE UP (ref 5–17)
ANION GAP SERPL CALC-SCNC: 7 MMOL/L — SIGNIFICANT CHANGE UP (ref 5–17)
BUN SERPL-MCNC: 12 MG/DL — SIGNIFICANT CHANGE UP (ref 7–23)
BUN SERPL-MCNC: 12 MG/DL — SIGNIFICANT CHANGE UP (ref 7–23)
CALCIUM SERPL-MCNC: 8.4 MG/DL — LOW (ref 8.5–10.1)
CALCIUM SERPL-MCNC: 8.4 MG/DL — LOW (ref 8.5–10.1)
CHLORIDE SERPL-SCNC: 110 MMOL/L — HIGH (ref 96–108)
CHLORIDE SERPL-SCNC: 110 MMOL/L — HIGH (ref 96–108)
CO2 SERPL-SCNC: 25 MMOL/L — SIGNIFICANT CHANGE UP (ref 22–31)
CO2 SERPL-SCNC: 25 MMOL/L — SIGNIFICANT CHANGE UP (ref 22–31)
CREAT SERPL-MCNC: 0.82 MG/DL — SIGNIFICANT CHANGE UP (ref 0.5–1.3)
CREAT SERPL-MCNC: 0.82 MG/DL — SIGNIFICANT CHANGE UP (ref 0.5–1.3)
CULTURE RESULTS: SIGNIFICANT CHANGE UP
EGFR: 82 ML/MIN/1.73M2 — SIGNIFICANT CHANGE UP
EGFR: 82 ML/MIN/1.73M2 — SIGNIFICANT CHANGE UP
GLUCOSE SERPL-MCNC: 82 MG/DL — SIGNIFICANT CHANGE UP (ref 70–99)
GLUCOSE SERPL-MCNC: 82 MG/DL — SIGNIFICANT CHANGE UP (ref 70–99)
HCT VFR BLD CALC: 29.1 % — LOW (ref 39–50)
HCT VFR BLD CALC: 29.1 % — LOW (ref 39–50)
HGB BLD-MCNC: 9.7 G/DL — LOW (ref 13–17)
HGB BLD-MCNC: 9.7 G/DL — LOW (ref 13–17)
INR BLD: 2.4 RATIO — HIGH (ref 0.85–1.18)
INR BLD: 2.4 RATIO — HIGH (ref 0.85–1.18)
MCHC RBC-ENTMCNC: 31.6 PG — SIGNIFICANT CHANGE UP (ref 27–34)
MCHC RBC-ENTMCNC: 31.6 PG — SIGNIFICANT CHANGE UP (ref 27–34)
MCHC RBC-ENTMCNC: 33.3 GM/DL — SIGNIFICANT CHANGE UP (ref 32–36)
MCHC RBC-ENTMCNC: 33.3 GM/DL — SIGNIFICANT CHANGE UP (ref 32–36)
MCV RBC AUTO: 94.8 FL — SIGNIFICANT CHANGE UP (ref 80–100)
MCV RBC AUTO: 94.8 FL — SIGNIFICANT CHANGE UP (ref 80–100)
NRBC # BLD: 0 /100 WBCS — SIGNIFICANT CHANGE UP (ref 0–0)
NRBC # BLD: 0 /100 WBCS — SIGNIFICANT CHANGE UP (ref 0–0)
PLATELET # BLD AUTO: 307 K/UL — SIGNIFICANT CHANGE UP (ref 150–400)
PLATELET # BLD AUTO: 307 K/UL — SIGNIFICANT CHANGE UP (ref 150–400)
POTASSIUM SERPL-MCNC: 3.9 MMOL/L — SIGNIFICANT CHANGE UP (ref 3.5–5.3)
POTASSIUM SERPL-MCNC: 3.9 MMOL/L — SIGNIFICANT CHANGE UP (ref 3.5–5.3)
POTASSIUM SERPL-SCNC: 3.9 MMOL/L — SIGNIFICANT CHANGE UP (ref 3.5–5.3)
POTASSIUM SERPL-SCNC: 3.9 MMOL/L — SIGNIFICANT CHANGE UP (ref 3.5–5.3)
PROTHROM AB SERPL-ACNC: 27.4 SEC — HIGH (ref 9.5–13)
PROTHROM AB SERPL-ACNC: 27.4 SEC — HIGH (ref 9.5–13)
RBC # BLD: 3.07 M/UL — LOW (ref 4.2–5.8)
RBC # BLD: 3.07 M/UL — LOW (ref 4.2–5.8)
RBC # FLD: 13.2 % — SIGNIFICANT CHANGE UP (ref 10.3–14.5)
RBC # FLD: 13.2 % — SIGNIFICANT CHANGE UP (ref 10.3–14.5)
SODIUM SERPL-SCNC: 142 MMOL/L — SIGNIFICANT CHANGE UP (ref 135–145)
SODIUM SERPL-SCNC: 142 MMOL/L — SIGNIFICANT CHANGE UP (ref 135–145)
SPECIMEN SOURCE: SIGNIFICANT CHANGE UP
WBC # BLD: 7.92 K/UL — SIGNIFICANT CHANGE UP (ref 3.8–10.5)
WBC # BLD: 7.92 K/UL — SIGNIFICANT CHANGE UP (ref 3.8–10.5)
WBC # FLD AUTO: 7.92 K/UL — SIGNIFICANT CHANGE UP (ref 3.8–10.5)
WBC # FLD AUTO: 7.92 K/UL — SIGNIFICANT CHANGE UP (ref 3.8–10.5)

## 2023-10-23 PROCEDURE — 99232 SBSQ HOSP IP/OBS MODERATE 35: CPT

## 2023-10-23 RX ORDER — ACETAMINOPHEN 500 MG
2 TABLET ORAL
Qty: 0 | Refills: 0 | DISCHARGE
Start: 2023-10-23

## 2023-10-23 RX ORDER — TAMSULOSIN HYDROCHLORIDE 0.4 MG/1
0.8 CAPSULE ORAL AT BEDTIME
Refills: 0 | Status: DISCONTINUED | OUTPATIENT
Start: 2023-10-23 | End: 2023-10-23

## 2023-10-23 RX ORDER — TAMSULOSIN HYDROCHLORIDE 0.4 MG/1
2 CAPSULE ORAL
Qty: 0 | Refills: 0 | DISCHARGE
Start: 2023-10-23

## 2023-10-23 RX ORDER — FINASTERIDE 5 MG/1
1 TABLET, FILM COATED ORAL
Qty: 0 | Refills: 0 | DISCHARGE
Start: 2023-10-23

## 2023-10-23 RX ORDER — FUROSEMIDE 40 MG
1 TABLET ORAL
Refills: 0 | DISCHARGE

## 2023-10-23 RX ORDER — WARFARIN SODIUM 2.5 MG/1
1 TABLET ORAL
Qty: 30 | Refills: 0
Start: 2023-10-23 | End: 2023-11-21

## 2023-10-23 RX ORDER — MULTIVIT-MIN/FERROUS GLUCONATE 9 MG/15 ML
1 LIQUID (ML) ORAL
Qty: 0 | Refills: 0 | DISCHARGE
Start: 2023-10-23

## 2023-10-23 RX ORDER — CEPHALEXIN 500 MG
1 CAPSULE ORAL
Refills: 0 | DISCHARGE

## 2023-10-23 RX ORDER — HYDRALAZINE HCL 50 MG
1 TABLET ORAL
Refills: 0 | DISCHARGE

## 2023-10-23 RX ORDER — WARFARIN SODIUM 2.5 MG/1
1 TABLET ORAL
Refills: 0 | DISCHARGE

## 2023-10-23 RX ORDER — HYDROCHLOROTHIAZIDE 25 MG
1 TABLET ORAL
Refills: 0 | DISCHARGE

## 2023-10-23 RX ADMIN — Medication 1 TABLET(S): at 12:13

## 2023-10-23 RX ADMIN — Medication 500 MILLIGRAM(S): at 05:50

## 2023-10-23 RX ADMIN — PIPERACILLIN AND TAZOBACTAM 25 GRAM(S): 4; .5 INJECTION, POWDER, LYOPHILIZED, FOR SOLUTION INTRAVENOUS at 05:10

## 2023-10-23 RX ADMIN — FINASTERIDE 5 MILLIGRAM(S): 5 TABLET, FILM COATED ORAL at 12:13

## 2023-10-23 NOTE — PROGRESS NOTE ADULT - PROVIDER SPECIALTY LIST ADULT
Nephrology
Urology
Urology
Cardiology
Cardiology
Internal Medicine
Internal Medicine
Urology
Cardiology
Infectious Disease
Internal Medicine
Internal Medicine
Nephrology
Urology
Urology

## 2023-10-23 NOTE — DISCHARGE NOTE PROVIDER - NSDCMRMEDTOKEN_GEN_ALL_CORE_FT
acetaminophen 325 mg oral tablet: 2 tab(s) orally every 6 hours As needed Temp greater or equal to 38.5C (101.3F), Moderate Pain (4 - 6)  brinzolamide 1% ophthalmic suspension: 1 drop(s) in each eye once a day  cephalexin 500 mg oral capsule: 1 cap(s) orally 2 times a day for 7 days ends 10/21/23  Coumadin 7.5 mg oral tablet: 1 tab(s) orally once a day (at bedtime)  finasteride 5 mg oral tablet: 1 tab(s) orally once a day  furosemide 40 mg oral tablet: 1 tab(s) orally once a day as needed  hydrALAZINE 25 mg oral tablet: 1 tab(s) orally once a day  hydroCHLOROthiazide 25 mg oral tablet: 1 tab(s) orally once a day  latanoprost 0.005% ophthalmic solution: 1 drop(s) in each eye once a day  losartan 100 mg oral tablet: 1 tab(s) orally once a day  Multiple Vitamins oral tablet: 1 tab(s) orally once a day  Multiple Vitamins with Minerals oral tablet: 1 tab(s) orally once a day  Norvasc 10 mg oral tablet: 1 tab(s) orally once a day  tamsulosin 0.4 mg oral capsule: 2 cap(s) orally once a day (at bedtime)   acetaminophen 325 mg oral tablet: 2 tab(s) orally every 6 hours As needed Temp greater or equal to 38.5C (101.3F), Moderate Pain (4 - 6)  brinzolamide 1% ophthalmic suspension: 1 drop(s) in each eye once a day  finasteride 5 mg oral tablet: 1 tab(s) orally once a day  latanoprost 0.005% ophthalmic solution: 1 drop(s) in each eye once a day  losartan 100 mg oral tablet: 1 tab(s) orally once a day  Multiple Vitamins with Minerals oral tablet: 1 tab(s) orally once a day  Norvasc 10 mg oral tablet: 1 tab(s) orally once a day  tamsulosin 0.4 mg oral capsule: 2 cap(s) orally once a day (at bedtime)  warfarin 3 mg oral tablet: 1 tab(s) orally once a day (at bedtime)

## 2023-10-23 NOTE — SOCIAL WORK PROGRESS NOTE - NSSWPROGRESSNOTE_GEN_ALL_CORE
Per medical team, pt cleared for dc today. Pt to be dc to [facility] for LASHUAN. Pt to be p/u via ambulette at 3PM. SW made pt and pt daughter Janell made aware of dc /transport for today and they were in agreement. SW also left VM for pt spouse informing her of dc for today. No further SW needs indicated at this time and will remain available as needed. Per medical team, pt cleared for dc today. Pt to be dc to [facility] for LASHAUN. Pt to be p/u via [facility] ambulette (P#[phone]) at 4:30PM. SW made pt and pt daughter Janell made aware of dc /transport for today and they were in agreement. SW also left VM for pt spouse informing her of dc for today. No further SW needs indicated at this time and will remain available as needed.

## 2023-10-23 NOTE — DISCHARGE NOTE PROVIDER - CARE PROVIDER_API CALL
Maryjane Saldivar  Urology  [address] [place] [address], [address]  [place] [address]  Phone: [phone]  Fax: [phone]  Follow Up Time: 2 weeks    Ilya Rico  Cardiology  [address] [place] [address]  Phone: [phone]  Fax: [phone]  Follow Up Time: 2 weeks    Hang Leger  Internal Medicine  [address] [place] [address]  Phone: [phone]  Fax: [phone]  Follow Up Time: 2 weeks

## 2023-10-23 NOTE — SOCIAL WORK PROGRESS NOTE - NSSWPROGRESSNOTE_GEN_ALL_CORE
Pt was approved for auth; auth number not generated yet. [id number]. Pt was approved for 5 days from 10/21-10/25. SW will continue to follow.

## 2023-10-23 NOTE — DISCHARGE NOTE NURSING/CASE MANAGEMENT/SOCIAL WORK - MODE OF TRANSPORTATION
Ambulette [facility] ambulette (P#[phone])/Ambulette

## 2023-10-23 NOTE — PROGRESS NOTE ADULT - SUBJECTIVE AND OBJECTIVE BOX
INTERVAL Hx:  Pt on Flomax and Proscar.  Failed TOV; palomino replaced yesterday.  Pt in good spirits, no complaints.    MEDICATIONS  (STANDING):  ascorbic acid 500 milliGRAM(s) Oral two times a day  finasteride 5 milliGRAM(s) Oral daily  latanoprost 0.005% Ophthalmic Solution 1 Drop(s) Both EYES at bedtime  multivitamin/minerals 1 Tablet(s) Oral daily  piperacillin/tazobactam IVPB.. 3.375 Gram(s) IV Intermittent every 8 hours  tamsulosin 0.4 milliGRAM(s) Oral at bedtime    MEDICATIONS  (PRN):  acetaminophen     Tablet .. 650 milliGRAM(s) Oral every 6 hours PRN Temp greater or equal to 38.5C (101.3F), Moderate Pain (4 - 6)        Vital Signs Last 24 Hrs  T(C): 36.8 (23 Oct 2023 05:40), Max: 36.8 (23 Oct 2023 05:40)  T(F): 98.2 (23 Oct 2023 05:40), Max: 98.2 (23 Oct 2023 05:40)  HR: 52 (23 Oct 2023 05:40) (51 - 52)  BP: 160/55 (23 Oct 2023 05:40) (160/55 - 167/57)  BP(mean): --  RR: 18 (23 Oct 2023 05:40) (18 - 18)  SpO2: 96% (23 Oct 2023 05:40) (96% - 96%)    Parameters below as of 23 Oct 2023 05:40  Patient On (Oxygen Delivery Method): room air        PHYSICAL EXAM:    ABDOMEN: soft, NT    Palomino: draining clear, yellow urine    LABS:                        9.7    7.92  )-----------( 307      ( 23 Oct 2023 06:08 )             29.1     10-23    142  |  110<H>  |  12  ----------------------------<  82  3.9   |  25  |  0.82    Ca    8.4<L>      23 Oct 2023 06:08

## 2023-10-23 NOTE — DISCHARGE NOTE PROVIDER - HOSPITAL COURSE
89-year-old male with a history of A-fib on coumadin,  HTN, macular degeneration , dx with COVID 10days ago, presents with weakness, cough, fatigue, dehydration, decreased p.o. intake, decreased urine output.  leucocytosis,  + COVID, CXR- ? b/l infiltrates    #Pneumonia vs UTI  sp zosyn course  remote COVID- off isolation    #BERNADETTE- likely prerenal- dehydration+post renal  resolved  #hypokalemia- replete lytes    #urinary retention  failed tov, palomino replaced  flomax and proscar started  outpt fu Dr Jacobson in 2wks    #Bradycardia-40s, 50s when alert, asymptomatic  avoid AVN blockers  cards outpt fu  #HTN- bp optimal  restarted home losartan and norvasc  hold home lasix and hctz- to add slowly as bp accepatable    #supratherapeutic INR-   likely dt dec po vit k deffi  sp vitamin K  restated coumadin at low dose 3  close monitoring INR q2 days initially    Vitals last 24 hrs  T(C): 36.9 (10-23-23 @ 12:40), Max: 36.9 (10-23-23 @ 12:40)  HR: 60 (10-23-23 @ 12:40) (51 - 60)  BP: 181/56 (10-23-23 @ 12:40) (160/55 - 181/56)  RR: 18 (10-23-23 @ 12:40) (18 - 18)  SpO2: 98% (10-23-23 @ 12:40) (96% - 98%)    PHYSICAL EXAM:  GENERAL: NAD, well-groomed, well-developed  HEAD:  Atraumatic, Normocephalic  EYES: EOMI, PERRLA, conjunctiva and sclera clear  ENMT: No tonsillar erythema, exudates, or enlargement; Moist mucous membranes  NECK: Supple, No JVD, Normal thyroid  HEART: Regular rate and rhythm; No murmurs, rubs, or gallops  RESPIRATORY: CTA B/L, No W/R/R  ABDOMEN: Soft, Nontender, Nondistended; Bowel sounds present  NEUROLOGY: A&Ox3, nonfocal, moving all extremities  EXTREMITIES:  2+ Peripheral Pulses, No clubbing, cyanosis, or edema  SKIN: warm, dry, normal color, no rash or abnormal lesions

## 2023-10-23 NOTE — PROGRESS NOTE ADULT - NS ATTEND AMEND GEN_ALL_CORE FT
89 y/o M with Afib (on Coumadin) and HTN presented to the ED with FTT, weakness, fatigue, dehydration, and decreased urine output with recent Dx of Covid (10 days ago), found to have supratherapeutic INR (~8), BERNADETTE, and mildly elevated troponin of 76.    Afib (on Coumadin)  - Presented with supratherapeutic INR (~8), likely, in the setting of poor PO intake.  - Hold Coumadin for now.  Would resume when INR close to 2-3  - Hold home AVN blockers for now, bradycardic overnight     - Appears compensated from HF POV, and on drier side  - ID eval for infection  - Nutritional support  - Please continue to maintain strict I/Os, monitor daily weights, Cr, and K.   - Monitor and replete electrolytes. Keep K>4.0 and Mg>2.0.   - Further cardiac workup will depend on clinical course.
89 y/o M with Afib (on Coumadin) and HTN presented to the ED with FTT, weakness, fatigue, dehydration, and decreased urine output with recent Dx of Covid (10 days ago), found to have supratherapeutic INR (~8), BERNADETTE, and mildly elevated troponin of 76.    Afib (on Coumadin)  - Presented with supratherapeutic INR (~8), likely, in the setting of poor PO intake. INR this AM 3.51.   - Hold Coumadin for now.  Would resume when INR close to 2.5/3  - Hold home AVN blockers, continue to remain in bradycardia overnight but up to 50's while awake, asymptomatic.     - Appears compensated from HF POV  - Please continue to maintain strict I/Os, monitor daily weights, Cr, and K.   - Monitor and replete electrolytes. Keep K>4.0 and Mg>2.0.   - Further cardiac workup will depend on clinical course.
89 y/o M with Afib (on Coumadin) and HTN presented to the ED with FTT, weakness, fatigue, dehydration, and decreased urine output with recent Dx of Covid (10 days ago), found to have supratherapeutic INR (~8), BERNADETTE, and mildly elevated troponin of 76.    Afib (on Coumadin)  - Presented with supratherapeutic INR (~8), likely, in the setting of poor PO intake.  - Hold Coumadin for now.  Would resume when INR = 2  - EKG showed Afib with SVR, 58. with RBBB (chronic).  Tele showing rates of 40's.  BP's stable  - Hold home AVN blockers for now    Elevated Troponin  - Trivial troponin,  likely demand in the setting of dehydration, infection, among other things  - ekg nonischemic and unchanges.   - TTE in 2021 showed normal LVSF, EF 55%, severe LAE, and mod LVH.  Can repeat routinely    - Appears compensated from HF POV, and on drier side  - ID eval for infection  - Nutrional support  - Please continue to maintain strict I/Os, monitor daily weights, Cr, and K.   - Monitor and replete electrolytes. Keep K>4.0 and Mg>2.0.   - Further cardiac workup will depend on clinical course.
89 y/o M with Afib (on Coumadin) and HTN presented to the ED with FTT, weakness, fatigue, dehydration, and decreased urine output with recent Dx of Covid (10 days ago), found to have supratherapeutic INR (~8), BERNADETTE, and mildly elevated troponin of 76.    Afib (on Coumadin)  - Presented with supratherapeutic INR (~8), likely, in the setting of poor PO intake.  - Hold Coumadin for now.  Would resume when INR close to 2.5  - Hold home AVN blockers, continue to remain in bradycardia overnight     - Appears compensated from HF POV  - Please continue to maintain strict I/Os, monitor daily weights, Cr, and K.   - Monitor and replete electrolytes. Keep K>4.0 and Mg>2.0.   - Further cardiac workup will depend on clinical course.
89 y/o M with Afib (on Coumadin) and HTN presented to the ED with FTT, weakness, fatigue, dehydration, and decreased urine output with recent Dx of Covid (10 days ago), found to have supratherapeutic INR (~8), BERNADETTE, and mildly elevated troponin of 76.    Afib (on Coumadin)  - Presented with supratherapeutic INR (~8), likely, in the setting of poor PO intake. INR 2.4  - resume coumadin  - Hold home AVN blockers, continue to remain in bradycardia overnight but up to 50's while awake, asymptomatic.     - Appears compensated from HF POV  - Please continue to maintain strict I/Os, monitor daily weights, Cr, and K.   - Monitor and replete electrolytes. Keep K>4.0 and Mg>2.0.   - Further cardiac workup will depend on clinical course.

## 2023-10-23 NOTE — PROGRESS NOTE ADULT - SUBJECTIVE AND OBJECTIVE BOX
[facility] -- Cayla Orta Pannella, Patel, Savella Goodger, Cohen  Office # [phone]      Follow Up:    AF, elevated troponin     Subjective/Observations:       REVIEW OF SYSTEMS: All other review of systems is negative unless indicated above    PAST MEDICAL & SURGICAL HISTORY:  HTN (hypertension)      Atrial fibrillation      Degeneration macular          MEDICATIONS  (STANDING):  ascorbic acid 500 milliGRAM(s) Oral two times a day  finasteride 5 milliGRAM(s) Oral daily  latanoprost 0.005% Ophthalmic Solution 1 Drop(s) Both EYES at bedtime  multivitamin/minerals 1 Tablet(s) Oral daily  piperacillin/tazobactam IVPB.. 3.375 Gram(s) IV Intermittent every 8 hours  tamsulosin 0.4 milliGRAM(s) Oral at bedtime    MEDICATIONS  (PRN):  acetaminophen     Tablet .. 650 milliGRAM(s) Oral every 6 hours PRN Temp greater or equal to 38.5C (101.3F), Moderate Pain (4 - 6)      Allergies    No Known Allergies    Intolerances        Vital Signs Last 24 Hrs  T(C): 36.8 (23 Oct 2023 05:40), Max: 36.8 (23 Oct 2023 05:40)  T(F): 98.2 (23 Oct 2023 05:40), Max: 98.2 (23 Oct 2023 05:40)  HR: 52 (23 Oct 2023 05:40) (51 - 52)  BP: 160/55 (23 Oct 2023 05:40) (160/55 - 167/57)  BP(mean): --  RR: 18 (23 Oct 2023 05:40) (17 - 18)  SpO2: 96% (23 Oct 2023 05:40) (91% - 96%)    Parameters below as of 23 Oct 2023 05:40  Patient On (Oxygen Delivery Method): room air        I&O's Summary    22 Oct 2023 07:01  -  23 Oct 2023 07:00  --------------------------------------------------------  IN: 545 mL / OUT: 1652 mL / NET: -1107 mL          PHYSICAL EXAM:  TELE: Af  Constitutional: NAD, awake and alert, well-developed  HEENT: Moist Mucous Membranes, Anicteric  Pulmonary: Non-labored, breath sounds are dim   Cardiovascular: IRRegular, S1 and S2 nl, murmur   Gastrointestinal: Bowel Sounds present, soft, nontender.   Lymph: No lymphadenopathy. No peripheral edema.  Skin: No visible rashes or ulcers.  Psych:  Mood & affect appropriate    LABS: All Labs Reviewed:                        9.7    7.92  )-----------( 307      ( 23 Oct 2023 06:08 )             29.1                         10.7   8.40  )-----------( 324      ( 22 Oct 2023 05:45 )             32.3                         10.6   8.23  )-----------( 319      ( 21 Oct 2023 06:05 )             31.6     23 Oct 2023 06:08    142    |  110    |  12     ----------------------------<  82     3.9     |  25     |  0.82   22 Oct 2023 05:45    141    |  109    |  12     ----------------------------<  82     4.2     |  25     |  0.71   21 Oct 2023 06:05    139    |  105    |  13     ----------------------------<  96     3.3     |  27     |  0.75     Ca    8.4        23 Oct 2023 06:08  Ca    8.4        22 Oct 2023 05:45  Ca    8.2        21 Oct 2023 06:05      PT/INR - ( 23 Oct 2023 06:08 )   PT: 27.4 sec;   INR: 2.40 ratio                  EC Lead ECG:   Ventricular Rate 54 BPM    QRS Duration 154 ms    Q-T Interval 498 ms    QTC Calculation(Bazett) 472 ms    R Axis 1 degrees    T Axis 37 degrees    Diagnosis Line Atrial fibrillation with slow ventricular response  Right bundle branch block  Voltage criteria for left ventricular hypertrophy  Abnormal ECG  Confirmed by anne Geller ([id number]) on 10/19/2023 2:29:09 PM (10-18-23 @ 21:23)        Radiology:

## 2023-10-23 NOTE — DISCHARGE NOTE NURSING/CASE MANAGEMENT/SOCIAL WORK - PATIENT PORTAL LINK FT
You can access the FollowMyHealth Patient Portal offered by [facility] by registering at the following website: [email or web address]. By joining [facility] FollowMyHealth portal, you will also be able to view your health information using other applications (apps) compatible with our system.

## 2023-10-23 NOTE — PROGRESS NOTE ADULT - ASSESSMENT
89 y/o M with Afib (on Coumadin) and HTN presented to the ED with FTT, weakness, fatigue, dehydration, and decreased urine output with recent Dx of Covid (10 days ago), found to have supratherapeutic INR (~8), BERNADETTE, and mildly elevated troponin of 76.    Afib, elev trop   - p/w supratherapeutic INR (~8), likely, in the setting of poor PO intake now 2.40  - Hold Coumadin for now INR 2.4 this am can resume tomorrow likely  goal INR 2-3     - EKG: Afib with SVR, 58. with RBBB (chronic).    - Afib with SVR 30-40' s nocturnal, mostly 50's when awake   - Hold AVN blockers for now    - Troponin: 86.8, -> 61.1 likely demand in the setting of dehydration, infection,   - no anginal complaints, no need to trend further   - EKG without sig ischemic changes. baseline RBBB  - TTE (2021) normal LVSF, EF 55%, severe LAE, and mod LVH.  Can repeat routinely    - Appears compensated from hf POV   - on room air     - Monitor and replete Lytes. Keep K > 4 and Mg > 2
89-year-old male with a history of A-fib on coumadin,  HTN, macular degeneration , dx with COVID 10days ago, presents with weakness, cough, fatigue, dehydration, decreased p.o. intake, decreased urine output.  leucocytosis,  + COVID, CXR- ? b/l infiltrates    #Pneumonia  +COVID dx >10days ago  zosyn  CT chest reviewed  pt sating well on RA    #BERNADETTE- likely prerenal- dehydration+post renal  resolved  #hypokalemia- replete lytes    #urinary retention   consulted- /w Dr Griffith cs appreciated   palomino placed, failed tov, palomino replaced  monitor for bleeding  flomax and proscar started    #Bradycardia-40s   asymptomatic  cards fu noted- monitor    #Elevated trops- likely demand  downtrending    #supratherapeutic INR  hold coumadin  sp vitamin K  monitor INR- 3.5 today      #HTN- bp optimal  hold home meds for now    #DVT ppx- elev inr    d/w dgtr at bedside-re goc/adv directives- she is hcp    [facility]   [phone]      
89-year-old male with a history of A-fib on coumadin,  HTN, macular degeneration , dx with COVID 10days ago, presents with weakness, cough, fatigue, dehydration, decreased p.o. intake, decreased urine output.  leucocytosis,  + COVID, CXR- ? b/l infiltrates    #Pneumonia  +COVID dx >10days ago  zosyn  CT chest reviewed  pt sating well on RA    #BERNADETTE- likely prerenal- dehydration+post renal  resolved  #hypokalemia- replete lytes    #urinary retention   consulted- /w Dr Griffith cs appreciated   palomino placed, fu i/o  plan TOV tonight  monitor for bleeding  flomax and proscar started    #Bradycardia-40s   asymptomatic  cards fu moted- monitor    #Elevated trops- likely demand  downtrending    #supratherapeutic INR  hold coumadin  sp vitamin K  monitor INR- 3.8 today      #HTN- bp optimal  hold home meds for now    #DVT ppx- coumadin    d/w dgtr at bedside-re goc/adv directives- she is hcp    [facility]   [phone]      
89-year-old male with a history of A-fib on coumadin,  HTN, macular degeneration , dx with COVID 10days ago, presents with weakness, cough, fatigue, dehydration, decreased p.o. intake, decreased urine output.  leucocytosis,  + COVID, CXR- b/l infiltrates    #Pneumonia  +COVID dx >10days ago  zosyn  CT chest reviewed  pt sating well on RA    #BERNADETTE- likely prerenal- dehydration+post renal  resolved  #hypokalemia- replete lytes    #urinary retention   consulted- /w Dr Robbins cs appreciated   palomino placed, fu i/o  monitor for bleeding  flomax and proscar started    #Bradycardia-40s   asymptomatic  cards fu moted- moniotr    #Elevated trops- likely demand  downtrending    #supratherapeutic INR  hold coumadin  sp vitamin K  monitor INR- 4 today    #HTN- bp optimal  hold home meds for now    #DVT ppx- supratherapeutic INR    [facility]   [phone]    d/w dgtr at bedside plan of care- dgtr Morena is HCP    
Resolved hematuria    Urinary retention    d/c palomino tonight at  for trial of void    Continue Flomax and Proscar upon d/c from hospital
Urinary retention  Continue Proscar  Increase Flomax to 0.8mg    OK to transfer to SNF with palomino  Outpatient f/u with me
89-year-old male with a history of A-fib, HTN, macular degeneration presents with weakness, fatigue, dehydration, decreased p.o. intake, decreased urine output.   Recent COVID infection. Reporting that he is having RLE pain    COVID Infection  possible superimposed PNA  Acute Cystitis  FTT    CXR clear lungs, however pt reporting URI sx  CT chest w/ Extensive calcified pleural plaques and pleural/parenchymal consolidation lower lobes, possibly reflecting sequelae of asbestosis.  DVT study negative  BCx NGTD  UA noted, possibly contaminated  S/p ceftriaxone/azithromycin in the ED  Cx NGTD    Recommendations:  C/w zosyn, can switch to ceftin 500mg BID to complete x5 day course until 10/22  Trend temps/WBC  Additional care per primary team    D/w Dr. Guajardo    Over the weekend Dr. Qureshi will be covering for our group.  Infectious Diseases will continue to follow. Please call with any questions.   Denise Sarabia M.D.  [facility] Division of Infectious Diseases [phone]  For after 5 P.M. and weekends, please call [phone]    
89-year-old male with a history of A-fib, HTN, macular degeneration presents with weakness, fatigue, dehydration, decreased p.o. intake, decreased urine output.   Recent COVID infection. Reporting that he is having RLE pain    COVID Infection  possible superimposed PNA  Acute Cystitis  FTT    CXR clear lungs, however pt reporting URI sx  CT chest w/ Extensive calcified pleural plaques and pleural/parenchymal consolidation lower lobes, possibly reflecting sequelae of asbestosis.  DVT study negative  BCx NGTD  UA noted, possibly contaminated  S/p ceftriaxone/azithromycin in the ED  Cx NGTD    Recommendations:  Monitor off Abx  Trend temps/WBC  Additional care per primary team    Infectious Diseases will continue to follow. Please call with any questions.   Denise Sarabia M.D.  [facility] Division of Infectious Diseases [phone]  For after 5 P.M. and weekends, please call [phone]    
89-year-old male with a history of A-fib, HTN, macular degeneration presents with weakness, fatigue, dehydration, decreased p.o. intake, decreased urine output.   Recent COVID infection. Reporting that he is having RLE pain    COVID Infection  possible superimposed PNA  Acute Cystitis  FTT    CXR clear lungs, however pt reporting URI sx  CT chest w/ Extensive calcified pleural plaques and pleural/parenchymal consolidation lower lobes, possibly reflecting sequelae of asbestosis.  DVT study negative  BCx NGTD  UA noted, possibly contaminated  S/p ceftriaxone/azithromycin in the ED  Leukocytosis to 11    Recommendations:  C/w zosyn  F/u pending cultures  Trend temps/WBC  Additional care per primary team    D/w Dr. Guajardo  Infectious Diseases will continue to follow. Please call with any questions.   Denise Sarabia M.D.  [facility] Division of Infectious Diseases [phone]  For after 5 P.M. and weekends, please call [phone]  
89 y/o M with Afib (on Coumadin) and HTN presented to the ED with FTT, weakness, fatigue, dehydration, and decreased urine output with recent Dx of Covid (10 days ago), found to have supratherapeutic INR (~8), BERNADETTE, and mildly elevated troponin of 76.    Afib, elev trop   - p/w supratherapeutic INR (~8), likely, in the setting of poor PO intake now 3.69    - Hold Coumadin for now.  Would resume when INR = 2    - EKG: Afib with SVR, 58. with RBBB (chronic).    - overnight remains Afib with SVR 30-50's    - Hold AVN blockers for now    - Troponin: 86.8, -> 61.1 likely demand in the setting of dehydration, infection,   - no anginal complaints, no need to trend further   - EKG without sig ischemic changes. baseline RBBB  - TTE (2021) normal LVSF, EF 55%, severe LAE, and mod LVH.  Can repeat routinely    - No evidence of volume overload  - No O2 requirement and SPO2 97%     - +Covid: iso and management per primary   - Monitor and replete Lytes. Keep K > 4 and Mg > 2    - Will continue to follow.    Mariangel Gilbert [facility]  Nurse Practitioner - Cardiology   call TEAMS    
89 y/o M with Afib (on Coumadin) and HTN presented to the ED with FTT, weakness, fatigue, dehydration, and decreased urine output with recent Dx of Covid (10 days ago), found to have supratherapeutic INR (~8), BERNADETTE, and mildly elevated troponin of 76.    Afib, elev trop   - p/w supratherapeutic INR (~8), likely, in the setting of poor PO intake now 3.82  - Hold Coumadin for now.  Would resume when INR = 2    - EKG: Afib with SVR, 58. with RBBB (chronic).    - rate controlled Afib 50's <-> SR 60's    - Hold AVN blockers for now    - Troponin: 86.8, -> 61.1 likely demand in the setting of dehydration, infection,   - no anginal complaints, no need to trend further   - EKG without sig ischemic changes. baseline RBBB  - TTE (2021) normal LVSF, EF 55%, severe LAE, and mod LVH.  Can repeat routinely    - No evidence of volume overload  - No O2 requirement and SPO2 97%     - +Covid: iso and management per primary   - Monitor and replete Lytes. Keep K > 4 and Mg > 2    - Will continue to follow.    MAYI AlvarezCLARISA  Nurse Practitioner - Cardiology   call TEAMS  
BERNADETTE: Prerenal azotemia, Urinary retention  Urinary retention  Hypokalemia  COVID 19+  Supratherapeutic INR    10/18/23: Gentle IV hydration. Will follow creatinine trend post palomino placement. Strict I & O. Potassium supplementation.   Avoid nephrotoxic meds as possible. Will follow electrolytes and renal function trend. Encourage PO intake as tolerated.   On flomax.  follow up.  10/19/23: Improved renal indices. Encourage PO intake as tolerated. Potassium supplementation. 
BERNADETTE: Prerenal azotemia, Urinary retention  Urinary retention  Hypokalemia  COVID 19+  Supratherapeutic INR    10/18/23: Gentle IV hydration. Will follow creatinine trend post palomino placement. Strict I & O. Potassium supplementation.   Avoid nephrotoxic meds as possible. Will follow electrolytes and renal function trend. Encourage PO intake as tolerated.   On flomax.  follow up.  10/19/23: Improved renal indices. Encourage PO intake as tolerated. Potassium supplementation.   10/20/23: Improved and stable renal indices. Potassium levels better. To continue current meds. Encourage PO intake as tolerated. 
Urinary retention  Continue Palomino/Flomax/Proscar    Hematuria from traumatic palomino attempt and INR>8  Resolved
Urinary retention.  Continue palomino, possible TOV tomorrow night  Continue Flomax, Proscar upon d/c from hospital    All questions from family members answered.
89 y/o M with Afib (on Coumadin) and HTN presented to the ED with FTT, weakness, fatigue, dehydration, and decreased urine output with recent Dx of Covid (10 days ago), found to have supratherapeutic INR (~8), BERNADETTE, and mildly elevated troponin of 76.    Afib, elev trop   - p/w supratherapeutic INR (~8), likely, in the setting of poor PO intake now 3.51  - Hold Coumadin for now.  Would resume when INR = 2    - EKG: Afib with SVR, 58. with RBBB (chronic).    - Afib with SVR 30-40' s nocturnal, mostly 50's when awake   - Hold AVN blockers for now    - Troponin: 86.8, -> 61.1 likely demand in the setting of dehydration, infection,   - no anginal complaints, no need to trend further   - EKG without sig ischemic changes. baseline RBBB  - TTE (2021) normal LVSF, EF 55%, severe LAE, and mod LVH.  Can repeat routinely    - No evidence of volume overload  - No O2 requirement and SPO2 97%     - +Covid: iso and management per primary   - Monitor and replete Lytes. Keep K > 4 and Mg > 2    - Will continue to follow.    Mariangel Gilbert [facility]  Nurse Practitioner - Cardiology   call TEAMS    
89-year-old male with a history of A-fib on coumadin,  HTN, macular degeneration , dx with COVID 10days ago, presents with weakness, cough, fatigue, dehydration, decreased p.o. intake, decreased urine output.  leucocytosis,  + COVID, CXR- ? b/l infiltrates    #Pneumonia  +COVID dx >10days ago  zosyn  CT chest reviewed  pt sating well on RA    #BERNADETTE- likely prerenal- dehydration+post renal  resolved  #hypokalemia- replete lytes    #urinary retention   consulted- /w Dr Robbins cs appreciated   palomino placed, fu i/o  monitor for bleeding  flomax and proscar started    #Bradycardia-40s   asymptomatic  cards fu moted- monitor    #Elevated trops- likely demand  downtrending    #supratherapeutic INR  hold coumadin  sp vitamin K  monitor INR- 3.6 today  will start dosing coumadin and closely monitor    #HTN- bp optimal  hold home meds for now    #DVT ppx- coumadin    d/w dgtr at bedside-re goc/adv directives- she is hcp    [facility]   [phone]      
Failed TOV  I was unable to place 16 Fr palomino.  I was able to place an 18 Fr coude catheter, with prompt drainage for 800cc clear, yellow urine.    Continue Flomax, Proscar  Pt should be discharged from hospital with palomino cath.
 89 y/o M with Afib (on Coumadin) and HTN presented to the ED with FTT, weakness, fatigue, dehydration, and decreased urine output with recent Dx of Covid (10 days ago), found to have supratherapeutic INR (~8), BERNADETTE, and mildly elevated troponin of 76.    Afib  - Presented with supratherapeutic INR (~8), likely, in the setting of poor PO intake .improving not 4.34   - Hold Coumadin for now.  Would resume when INR = 2    - EKG showed Afib with SVR, 58. with RBBB (chronic).  Tele showing rates of 40's on 10/18 per notes   overnight tele Afib 70's   - Hold AVN blockers for now    Elevated Troponin  - Trivial troponin,  likely demand in the setting of dehydration, infection,   -  trend till peak  - EKG without sig ischemic changes. baseline RBBB  - TTE in 2021 showed normal LVSF, EF 55%, severe LAE, and mod LVH.  Can repeat routinely    Hypokalemia  -K 2.8 this am , supplement for goal K > 4.0     - No evidence of volume overload  - No O2 requirement and satting 97%     Covid per primary     Monitor and replete electrolytes. Keep K>4.0 and Mg>2.0.  Lorena Cruz FNP-C  Cardiology NP  call teams

## 2023-10-23 NOTE — DISCHARGE NOTE PROVIDER - NSDCCPCAREPLAN_GEN_ALL_CORE_FT
PRINCIPAL DISCHARGE DIAGNOSIS  Diagnosis: RLL pneumonia  Assessment and Plan of Treatment: sepsis present on admission dt pna +/- UTI-sp zosyn course      SECONDARY DISCHARGE DIAGNOSES  Diagnosis: Hypokalemia  Assessment and Plan of Treatment:     Diagnosis: Elevated INR  Assessment and Plan of Treatment: coumadin home dose dectreased 3mg qd( INR today-2.4), close monitoring INR check daily for now    Diagnosis: Adult failure to thrive  Assessment and Plan of Treatment:     Diagnosis: Bradycardia  Assessment and Plan of Treatment: afib with bradycardia- pt asymptomatic HR 40s,- 50s, cards -no intervention, outpt cards fu

## 2023-10-23 NOTE — DISCHARGE NOTE PROVIDER - PROVIDER TOKENS
PROVIDER:[TOKEN:[[id number]],FOLLOWUP:[2 weeks]],PROVIDER:[TOKEN:[[id number]],FOLLOWUP:[2 weeks]],PROVIDER:[TOKEN:[[id number]],FOLLOWUP:[2 weeks]]

## 2023-10-23 NOTE — PROGRESS NOTE ADULT - SUBJECTIVE AND OBJECTIVE BOX
[facility], Division of Infectious Diseases  JIAN Carrizales Y. Patel, S. Shah, G. [facility]  [phone]    Name: GURINDER TURNER  Age: 89y  Gender: Male  MRN: [id number]    Interval History:  Patient seen and examined at bedside this morning  No acute overnight events. Afebrile  Notes reviewed    Antibiotics:      Medications:  acetaminophen     Tablet .. 650 milliGRAM(s) Oral every 6 hours PRN  ascorbic acid 500 milliGRAM(s) Oral two times a day  finasteride 5 milliGRAM(s) Oral daily  latanoprost 0.005% Ophthalmic Solution 1 Drop(s) Both EYES at bedtime  multivitamin/minerals 1 Tablet(s) Oral daily  tamsulosin 0.8 milliGRAM(s) Oral at bedtime      Review of Systems:  A 10-point review of systems was obtained.     Pertinent positives and negatives--  Constitutional: No fevers. No Chills. No Rigors.   Cardiovascular: No chest pain. No palpitations.  Respiratory: No shortness of breath. No cough.  Gastrointestinal: No nausea or vomiting. No diarrhea or constipation.   Psychiatric: Pleasant. Appropriate affect.    Review of systems otherwise negative except as previously noted.    Allergies: No Known Allergies    For details regarding the patient's past medical history, social history, family history, and other miscellaneous elements, please refer the initial infectious diseases consultation and/or the admitting history and physical examination for this admission.    Objective:  Vitals:   T(C): 36.9 (10-23-23 @ 12:40), Max: 36.9 (10-23-23 @ 12:40)  HR: 60 (10-23-23 @ 12:40) (51 - 60)  BP: 181/56 (10-23-23 @ 12:40) (160/55 - 181/56)  RR: 18 (10-23-23 @ 12:40) (18 - 18)  SpO2: 98% (10-23-23 @ 12:40) (96% - 98%)    Physical Examination:  General: no acute distress  HEENT: NC/AT, EOMI,   Cardio: S1, S2 heard, RRR, no murmurs  Resp: decreased breath sounds  Abd: soft, NT, ND  Ext: no edema or cyanosis  Skin: warm, dry, no visible rash      Laboratory Studies:  CBC:                       9.7    7.92  )-----------( 307      ( 23 Oct 2023 06:08 )             29.1     CMP: 10-23    142  |  110<H>  |  12  ----------------------------<  82  3.9   |  25  |  0.82    Ca    8.4<L>      23 Oct 2023 06:08        Urinalysis Basic - ( 23 Oct 2023 06:08 )    Color: x / Appearance: x / SG: x / pH: x  Gluc: 82 mg/dL / Ketone: x  / Bili: x / Urobili: x   Blood: x / Protein: x / Nitrite: x   Leuk Esterase: x / RBC: x / WBC x   Sq Epi: x / Non Sq Epi: x / Bacteria: x        Microbiology: reviewed    Culture - Urine (collected 10-18-23 @ 20:15)  Source: Clean Catch Clean Catch (Midstream)  Final Report (10-19-23 @ 19:32):    No growth    Culture - Blood (collected 10-18-23 @ 06:35)  Source: .Blood Blood-Peripheral  Final Report (10-23-23 @ 09:01):    No growth at 5 days    Culture - Blood (collected 10-18-23 @ 06:25)  Source: .Blood Blood-Peripheral  Final Report (10-23-23 @ 09:01):    No growth at 5 days          Radiology: reviewed

## 2023-10-23 NOTE — DISCHARGE NOTE PROVIDER - CARE PROVIDERS DIRECT ADDRESSES
,[email or web address],DirectAddress_Unknown

## 2023-10-23 NOTE — DISCHARGE NOTE NURSING/CASE MANAGEMENT/SOCIAL WORK - NSDCPEFALRISK_GEN_ALL_CORE
For information on Fall & Injury Prevention, visit: [email or web address] OR  [email or web address] OR  https://www.cdc.gov/steadi/patient.html

## 2023-11-01 ENCOUNTER — LABORATORY RESULT (OUTPATIENT)
Age: 88
End: 2023-11-01

## 2023-11-13 PROCEDURE — 85610 PROTHROMBIN TIME: CPT

## 2023-11-13 PROCEDURE — 96374 THER/PROPH/DIAG INJ IV PUSH: CPT

## 2023-11-13 PROCEDURE — 71045 X-RAY EXAM CHEST 1 VIEW: CPT

## 2023-11-13 PROCEDURE — 99285 EMERGENCY DEPT VISIT HI MDM: CPT | Mod: 25

## 2023-11-13 PROCEDURE — 93005 ELECTROCARDIOGRAM TRACING: CPT

## 2023-11-13 PROCEDURE — 85730 THROMBOPLASTIN TIME PARTIAL: CPT

## 2023-11-13 PROCEDURE — 97161 PT EVAL LOW COMPLEX 20 MIN: CPT

## 2023-11-13 PROCEDURE — 0225U NFCT DS DNA&RNA 21 SARSCOV2: CPT

## 2023-11-13 PROCEDURE — 84443 ASSAY THYROID STIM HORMONE: CPT

## 2023-11-13 PROCEDURE — 71250 CT THORAX DX C-: CPT

## 2023-11-13 PROCEDURE — 80048 BASIC METABOLIC PNL TOTAL CA: CPT

## 2023-11-13 PROCEDURE — 85027 COMPLETE CBC AUTOMATED: CPT

## 2023-11-13 PROCEDURE — 36415 COLL VENOUS BLD VENIPUNCTURE: CPT

## 2023-11-13 PROCEDURE — 85025 COMPLETE CBC W/AUTO DIFF WBC: CPT

## 2023-11-13 PROCEDURE — 84484 ASSAY OF TROPONIN QUANT: CPT

## 2023-11-13 PROCEDURE — 93971 EXTREMITY STUDY: CPT

## 2023-11-13 PROCEDURE — 87086 URINE CULTURE/COLONY COUNT: CPT

## 2023-11-13 PROCEDURE — 87040 BLOOD CULTURE FOR BACTERIA: CPT

## 2023-11-13 PROCEDURE — 83605 ASSAY OF LACTIC ACID: CPT

## 2023-11-13 PROCEDURE — 81001 URINALYSIS AUTO W/SCOPE: CPT

## 2023-11-13 PROCEDURE — 80053 COMPREHEN METABOLIC PANEL: CPT

## 2023-11-13 PROCEDURE — 96375 TX/PRO/DX INJ NEW DRUG ADDON: CPT

## 2023-12-05 ENCOUNTER — LABORATORY RESULT (OUTPATIENT)
Age: 88
End: 2023-12-05

## 2023-12-05 ENCOUNTER — NON-APPOINTMENT (OUTPATIENT)
Age: 88
End: 2023-12-05

## 2023-12-06 RX ORDER — TAMSULOSIN HYDROCHLORIDE 0.4 MG/1
0.4 CAPSULE ORAL AT BEDTIME
Refills: 0 | Status: ACTIVE | COMMUNITY
Start: 2023-12-06

## 2023-12-06 RX ORDER — FINASTERIDE 5 MG/1
5 TABLET, FILM COATED ORAL DAILY
Refills: 0 | Status: ACTIVE | COMMUNITY
Start: 2023-12-06

## 2023-12-08 ENCOUNTER — LABORATORY RESULT (OUTPATIENT)
Age: 88
End: 2023-12-08

## 2023-12-08 ENCOUNTER — NON-APPOINTMENT (OUTPATIENT)
Age: 88
End: 2023-12-08

## 2023-12-08 NOTE — ED PROVIDER NOTE - WR INTERPRETED BY 1
RD aware of wounds. See nutrition note from earlier today.  Grisel Coleman RD, LDN, CNSC  Please contact via nutrition consult or [employer] secure chat. Phone      Lisa Hall

## 2023-12-13 ENCOUNTER — LABORATORY RESULT (OUTPATIENT)
Age: 88
End: 2023-12-13

## 2023-12-13 ENCOUNTER — NON-APPOINTMENT (OUTPATIENT)
Age: 88
End: 2023-12-13

## 2023-12-27 ENCOUNTER — NON-APPOINTMENT (OUTPATIENT)
Age: 88
End: 2023-12-27

## 2023-12-27 ENCOUNTER — LABORATORY RESULT (OUTPATIENT)
Age: 88
End: 2023-12-27

## 2023-12-28 ENCOUNTER — NON-APPOINTMENT (OUTPATIENT)
Age: 88
End: 2023-12-28

## 2024-01-03 ENCOUNTER — NON-APPOINTMENT (OUTPATIENT)
Age: 89
End: 2024-01-03

## 2024-01-03 ENCOUNTER — LABORATORY RESULT (OUTPATIENT)
Age: 89
End: 2024-01-03

## 2024-01-05 ENCOUNTER — INPATIENT (INPATIENT)
Facility: HOSPITAL | Age: 89
LOS: 7 days | Discharge: HOME | DRG: 698 | End: 2024-01-13
Attending: INTERNAL MEDICINE
Payer: MEDICARE

## 2024-01-05 VITALS
WEIGHT: 139.99 LBS | HEART RATE: 62 BPM | OXYGEN SATURATION: 97 % | RESPIRATION RATE: 18 BRPM | SYSTOLIC BLOOD PRESSURE: 188 MMHG | DIASTOLIC BLOOD PRESSURE: 64 MMHG | TEMPERATURE: 98 F

## 2024-01-05 DIAGNOSIS — N39.0 URINARY TRACT INFECTION, SITE NOT SPECIFIED: ICD-10-CM

## 2024-01-05 PROBLEM — I48.91 UNSPECIFIED ATRIAL FIBRILLATION: Chronic | Status: ACTIVE | Noted: 2023-10-18

## 2024-01-05 PROBLEM — I10 ESSENTIAL (PRIMARY) HYPERTENSION: Chronic | Status: ACTIVE | Noted: 2023-10-18

## 2024-01-05 PROBLEM — H35.30 UNSPECIFIED MACULAR DEGENERATION: Chronic | Status: ACTIVE | Noted: 2023-10-18

## 2024-01-05 LAB
ALBUMIN SERPL ELPH-MCNC: 3.1 G/DL — LOW (ref 3.3–5)
ALBUMIN SERPL ELPH-MCNC: 3.1 G/DL — LOW (ref 3.3–5)
ALP SERPL-CCNC: 119 U/L — SIGNIFICANT CHANGE UP (ref 40–120)
ALP SERPL-CCNC: 119 U/L — SIGNIFICANT CHANGE UP (ref 40–120)
ALT FLD-CCNC: 28 U/L — SIGNIFICANT CHANGE UP (ref 12–78)
ALT FLD-CCNC: 28 U/L — SIGNIFICANT CHANGE UP (ref 12–78)
ANION GAP SERPL CALC-SCNC: 9 MMOL/L — SIGNIFICANT CHANGE UP (ref 5–17)
ANION GAP SERPL CALC-SCNC: 9 MMOL/L — SIGNIFICANT CHANGE UP (ref 5–17)
APPEARANCE UR: ABNORMAL
APPEARANCE UR: ABNORMAL
APTT BLD: 47.8 SEC — HIGH (ref 24.5–35.6)
APTT BLD: 47.8 SEC — HIGH (ref 24.5–35.6)
AST SERPL-CCNC: 25 U/L — SIGNIFICANT CHANGE UP (ref 15–37)
AST SERPL-CCNC: 25 U/L — SIGNIFICANT CHANGE UP (ref 15–37)
BACTERIA # UR AUTO: ABNORMAL /HPF
BACTERIA # UR AUTO: ABNORMAL /HPF
BASOPHILS # BLD AUTO: 0 K/UL — SIGNIFICANT CHANGE UP (ref 0–0.2)
BASOPHILS # BLD AUTO: 0 K/UL — SIGNIFICANT CHANGE UP (ref 0–0.2)
BASOPHILS NFR BLD AUTO: 0 % — SIGNIFICANT CHANGE UP (ref 0–2)
BASOPHILS NFR BLD AUTO: 0 % — SIGNIFICANT CHANGE UP (ref 0–2)
BILIRUB SERPL-MCNC: 0.9 MG/DL — SIGNIFICANT CHANGE UP (ref 0.2–1.2)
BILIRUB SERPL-MCNC: 0.9 MG/DL — SIGNIFICANT CHANGE UP (ref 0.2–1.2)
BILIRUB UR-MCNC: NEGATIVE — SIGNIFICANT CHANGE UP
BILIRUB UR-MCNC: NEGATIVE — SIGNIFICANT CHANGE UP
BUN SERPL-MCNC: 34 MG/DL — HIGH (ref 7–23)
BUN SERPL-MCNC: 34 MG/DL — HIGH (ref 7–23)
CALCIUM SERPL-MCNC: 9.1 MG/DL — SIGNIFICANT CHANGE UP (ref 8.5–10.1)
CALCIUM SERPL-MCNC: 9.1 MG/DL — SIGNIFICANT CHANGE UP (ref 8.5–10.1)
CHLORIDE SERPL-SCNC: 102 MMOL/L — SIGNIFICANT CHANGE UP (ref 96–108)
CHLORIDE SERPL-SCNC: 102 MMOL/L — SIGNIFICANT CHANGE UP (ref 96–108)
CO2 SERPL-SCNC: 23 MMOL/L — SIGNIFICANT CHANGE UP (ref 22–31)
CO2 SERPL-SCNC: 23 MMOL/L — SIGNIFICANT CHANGE UP (ref 22–31)
COLOR SPEC: YELLOW — SIGNIFICANT CHANGE UP
COLOR SPEC: YELLOW — SIGNIFICANT CHANGE UP
CREAT SERPL-MCNC: 1.3 MG/DL — SIGNIFICANT CHANGE UP (ref 0.5–1.3)
CREAT SERPL-MCNC: 1.3 MG/DL — SIGNIFICANT CHANGE UP (ref 0.5–1.3)
DIFF PNL FLD: ABNORMAL
DIFF PNL FLD: ABNORMAL
EGFR: 51 ML/MIN/1.73M2 — LOW
EGFR: 51 ML/MIN/1.73M2 — LOW
EOSINOPHIL # BLD AUTO: 0 K/UL — SIGNIFICANT CHANGE UP (ref 0–0.5)
EOSINOPHIL # BLD AUTO: 0 K/UL — SIGNIFICANT CHANGE UP (ref 0–0.5)
EOSINOPHIL NFR BLD AUTO: 0 % — SIGNIFICANT CHANGE UP (ref 0–6)
EOSINOPHIL NFR BLD AUTO: 0 % — SIGNIFICANT CHANGE UP (ref 0–6)
GLUCOSE SERPL-MCNC: 129 MG/DL — HIGH (ref 70–99)
GLUCOSE SERPL-MCNC: 129 MG/DL — HIGH (ref 70–99)
GLUCOSE UR QL: NEGATIVE MG/DL — SIGNIFICANT CHANGE UP
GLUCOSE UR QL: NEGATIVE MG/DL — SIGNIFICANT CHANGE UP
HCT VFR BLD CALC: 38.3 % — LOW (ref 39–50)
HCT VFR BLD CALC: 38.3 % — LOW (ref 39–50)
HGB BLD-MCNC: 12.3 G/DL — LOW (ref 13–17)
HGB BLD-MCNC: 12.3 G/DL — LOW (ref 13–17)
INR BLD: 3.61 RATIO — HIGH (ref 0.85–1.18)
INR BLD: 3.61 RATIO — HIGH (ref 0.85–1.18)
KETONES UR-MCNC: NEGATIVE MG/DL — SIGNIFICANT CHANGE UP
KETONES UR-MCNC: NEGATIVE MG/DL — SIGNIFICANT CHANGE UP
LEUKOCYTE ESTERASE UR-ACNC: ABNORMAL
LEUKOCYTE ESTERASE UR-ACNC: ABNORMAL
LYMPHOCYTES # BLD AUTO: 0.28 K/UL — LOW (ref 1–3.3)
LYMPHOCYTES # BLD AUTO: 0.28 K/UL — LOW (ref 1–3.3)
LYMPHOCYTES # BLD AUTO: 1 % — LOW (ref 13–44)
LYMPHOCYTES # BLD AUTO: 1 % — LOW (ref 13–44)
MCHC RBC-ENTMCNC: 30.8 PG — SIGNIFICANT CHANGE UP (ref 27–34)
MCHC RBC-ENTMCNC: 30.8 PG — SIGNIFICANT CHANGE UP (ref 27–34)
MCHC RBC-ENTMCNC: 32.1 GM/DL — SIGNIFICANT CHANGE UP (ref 32–36)
MCHC RBC-ENTMCNC: 32.1 GM/DL — SIGNIFICANT CHANGE UP (ref 32–36)
MCV RBC AUTO: 96 FL — SIGNIFICANT CHANGE UP (ref 80–100)
MCV RBC AUTO: 96 FL — SIGNIFICANT CHANGE UP (ref 80–100)
MONOCYTES # BLD AUTO: 0.56 K/UL — SIGNIFICANT CHANGE UP (ref 0–0.9)
MONOCYTES # BLD AUTO: 0.56 K/UL — SIGNIFICANT CHANGE UP (ref 0–0.9)
MONOCYTES NFR BLD AUTO: 2 % — SIGNIFICANT CHANGE UP (ref 2–14)
MONOCYTES NFR BLD AUTO: 2 % — SIGNIFICANT CHANGE UP (ref 2–14)
NEUTROPHILS # BLD AUTO: 27.38 K/UL — HIGH (ref 1.8–7.4)
NEUTROPHILS # BLD AUTO: 27.38 K/UL — HIGH (ref 1.8–7.4)
NEUTROPHILS NFR BLD AUTO: 91 % — HIGH (ref 43–77)
NEUTROPHILS NFR BLD AUTO: 91 % — HIGH (ref 43–77)
NITRITE UR-MCNC: POSITIVE
NITRITE UR-MCNC: POSITIVE
NRBC # BLD: SIGNIFICANT CHANGE UP /100 WBCS (ref 0–0)
NRBC # BLD: SIGNIFICANT CHANGE UP /100 WBCS (ref 0–0)
PH UR: 8.5 (ref 5–8)
PH UR: 8.5 (ref 5–8)
PLATELET # BLD AUTO: 418 K/UL — HIGH (ref 150–400)
PLATELET # BLD AUTO: 418 K/UL — HIGH (ref 150–400)
POTASSIUM SERPL-MCNC: 4.3 MMOL/L — SIGNIFICANT CHANGE UP (ref 3.5–5.3)
POTASSIUM SERPL-MCNC: 4.3 MMOL/L — SIGNIFICANT CHANGE UP (ref 3.5–5.3)
POTASSIUM SERPL-SCNC: 4.3 MMOL/L — SIGNIFICANT CHANGE UP (ref 3.5–5.3)
POTASSIUM SERPL-SCNC: 4.3 MMOL/L — SIGNIFICANT CHANGE UP (ref 3.5–5.3)
PROT SERPL-MCNC: 7.8 G/DL — SIGNIFICANT CHANGE UP (ref 6–8.3)
PROT SERPL-MCNC: 7.8 G/DL — SIGNIFICANT CHANGE UP (ref 6–8.3)
PROT UR-MCNC: 300 MG/DL
PROT UR-MCNC: 300 MG/DL
PROTHROM AB SERPL-ACNC: 40.7 SEC — HIGH (ref 9.5–13)
PROTHROM AB SERPL-ACNC: 40.7 SEC — HIGH (ref 9.5–13)
RBC # BLD: 3.99 M/UL — LOW (ref 4.2–5.8)
RBC # BLD: 3.99 M/UL — LOW (ref 4.2–5.8)
RBC # FLD: 16.1 % — HIGH (ref 10.3–14.5)
RBC # FLD: 16.1 % — HIGH (ref 10.3–14.5)
RBC CASTS # UR COMP ASSIST: ABNORMAL /HPF
RBC CASTS # UR COMP ASSIST: ABNORMAL /HPF
SODIUM SERPL-SCNC: 134 MMOL/L — LOW (ref 135–145)
SODIUM SERPL-SCNC: 134 MMOL/L — LOW (ref 135–145)
SP GR SPEC: 1.01 — SIGNIFICANT CHANGE UP (ref 1–1.03)
SP GR SPEC: 1.01 — SIGNIFICANT CHANGE UP (ref 1–1.03)
UROBILINOGEN FLD QL: 1 MG/DL — SIGNIFICANT CHANGE UP (ref 0.2–1)
UROBILINOGEN FLD QL: 1 MG/DL — SIGNIFICANT CHANGE UP (ref 0.2–1)
WBC # BLD: 28.23 K/UL — HIGH (ref 3.8–10.5)
WBC # BLD: 28.23 K/UL — HIGH (ref 3.8–10.5)
WBC # FLD AUTO: 28.23 K/UL — HIGH (ref 3.8–10.5)
WBC # FLD AUTO: 28.23 K/UL — HIGH (ref 3.8–10.5)
WBC UR QL: 6 /HPF — HIGH (ref 0–5)
WBC UR QL: 6 /HPF — HIGH (ref 0–5)

## 2024-01-05 PROCEDURE — 99285 EMERGENCY DEPT VISIT HI MDM: CPT

## 2024-01-05 PROCEDURE — 99222 1ST HOSP IP/OBS MODERATE 55: CPT

## 2024-01-05 PROCEDURE — 71045 X-RAY EXAM CHEST 1 VIEW: CPT | Mod: 26

## 2024-01-05 RX ORDER — CEFTRIAXONE 500 MG/1
1000 INJECTION, POWDER, FOR SOLUTION INTRAMUSCULAR; INTRAVENOUS ONCE
Refills: 0 | Status: COMPLETED | OUTPATIENT
Start: 2024-01-05 | End: 2024-01-05

## 2024-01-05 RX ORDER — SODIUM CHLORIDE 9 MG/ML
1000 INJECTION INTRAMUSCULAR; INTRAVENOUS; SUBCUTANEOUS
Refills: 0 | Status: DISCONTINUED | OUTPATIENT
Start: 2024-01-05 | End: 2024-01-08

## 2024-01-05 RX ORDER — TAMSULOSIN HYDROCHLORIDE 0.4 MG/1
0.8 CAPSULE ORAL AT BEDTIME
Refills: 0 | Status: DISCONTINUED | OUTPATIENT
Start: 2024-01-05 | End: 2024-01-06

## 2024-01-05 RX ORDER — FINASTERIDE 5 MG/1
5 TABLET, FILM COATED ORAL DAILY
Refills: 0 | Status: DISCONTINUED | OUTPATIENT
Start: 2024-01-05 | End: 2024-01-13

## 2024-01-05 RX ORDER — ACETAMINOPHEN 500 MG
650 TABLET ORAL EVERY 6 HOURS
Refills: 0 | Status: DISCONTINUED | OUTPATIENT
Start: 2024-01-05 | End: 2024-01-13

## 2024-01-05 RX ORDER — ONDANSETRON 8 MG/1
4 TABLET, FILM COATED ORAL EVERY 8 HOURS
Refills: 0 | Status: DISCONTINUED | OUTPATIENT
Start: 2024-01-05 | End: 2024-01-13

## 2024-01-05 RX ORDER — CEFTRIAXONE 500 MG/1
1000 INJECTION, POWDER, FOR SOLUTION INTRAMUSCULAR; INTRAVENOUS EVERY 24 HOURS
Refills: 0 | Status: DISCONTINUED | OUTPATIENT
Start: 2024-01-05 | End: 2024-01-06

## 2024-01-05 RX ORDER — LANOLIN ALCOHOL/MO/W.PET/CERES
3 CREAM (GRAM) TOPICAL AT BEDTIME
Refills: 0 | Status: DISCONTINUED | OUTPATIENT
Start: 2024-01-05 | End: 2024-01-13

## 2024-01-05 RX ADMIN — TAMSULOSIN HYDROCHLORIDE 0.8 MILLIGRAM(S): 0.4 CAPSULE ORAL at 23:09

## 2024-01-05 RX ADMIN — CEFTRIAXONE 100 MILLIGRAM(S): 500 INJECTION, POWDER, FOR SOLUTION INTRAMUSCULAR; INTRAVENOUS at 19:37

## 2024-01-05 NOTE — ED ADULT NURSE NOTE - NSFALLHARMRISKINTERV_ED_ALL_ED
Assistance OOB with selected safe patient handling equipment if applicable/Assistance with ambulation/Communicate risk of Fall with Harm to all staff, patient, and family/Monitor gait and stability/Provide patient with walking aids/Provide visual cue: red socks, yellow wristband, yellow gown, etc/Reinforce activity limits and safety measures with patient and family/Toileting schedule using arm’s reach rule for commode and bathroom/Bed in lowest position, wheels locked, appropriate side rails in place/Call bell, personal items and telephone in reach/Instruct patient to call for assistance before getting out of bed/chair/stretcher/Non-slip footwear applied when patient is off stretcher/[place] to call system/Physically safe environment - no spills, clutter or unnecessary equipment/Purposeful Proactive Rounding/Room/bathroom lighting operational, light cord in reach Assistance OOB with selected safe patient handling equipment if applicable/Assistance with ambulation/Communicate risk of Fall with Harm to all staff, patient, and family/Monitor gait and stability/Provide patient with walking aids/Provide visual cue: red socks, yellow wristband, yellow gown, etc/Reinforce activity limits and safety measures with patient and family/Toileting schedule using arm’s reach rule for commode and bathroom/Bed in lowest position, wheels locked, appropriate side rails in place/Call bell, personal items and telephone in reach/Instruct patient to call for assistance before getting out of bed/chair/stretcher/Non-slip footwear applied when patient is off stretcher/[place] to call system/Physically safe environment - no spills, clutter or unnecessary equipment/Purposeful Proactive Rounding/Room/bathroom lighting operational, light cord in reach

## 2024-01-05 NOTE — ED PROVIDER NOTE - PROGRESS NOTE DETAILS
UA significant for infection--->covered with CTX and admitted to hospitalist service. CXR reviewed c/w pleural plaques noted on previous CT chest.

## 2024-01-05 NOTE — CONSULT NOTE ADULT - SUBJECTIVE AND OBJECTIVE BOX
HISTORY OF PRESENT ILLNESS:  89 y/o male on chronic palomino drainage since admission to this hospital 2 months ago for over-anticoagulation with Coumadin and urinary retention, brought in for poor urine output from palomino.  In the ED, staff was unable to place new palomino once the old one was removed.    PAST MEDICAL & SURGICAL HISTORY:  HTN (hypertension)      Atrial fibrillation      Degeneration macular        MEDICATIONS  (STANDING):  cefTRIAXone   IVPB 1000 milliGRAM(s) IV Intermittent once      Allergies    No Known Allergies      SOCIAL HISTORY:   Work:  worked for [employer]      FAMILY HISTORY:      Vital Signs Last 24 Hrs  T(C): 36.8 (05 Jan 2024 15:02), Max: 36.8 (05 Jan 2024 15:02)  T(F): 98.3 (05 Jan 2024 15:02), Max: 98.3 (05 Jan 2024 15:02)  HR: 64 (05 Jan 2024 17:56) (62 - 64)  BP: 155/89 (05 Jan 2024 17:56) (155/89 - 188/64)  BP(mean): --  RR: 17 (05 Jan 2024 17:56) (17 - 18)  SpO2: 96% (05 Jan 2024 17:56) (96% - 97%)    Parameters below as of 05 Jan 2024 17:56  Patient On (Oxygen Delivery Method): room air        PHYSICAL EXAM:    slightly confused male in NAD  Marked iatrogenic hypospadias to penoscrotal junction due to long term palomino      LABS:                        12.3   28.23 )-----------( 418      ( 05 Jan 2024 17:59 )             38.3     01-05    134<L>  |  102  |  34<H>  ----------------------------<  129<H>  4.3   |  23  |  1.30    Ca    9.1      05 Jan 2024 17:59    TPro  7.8  /  Alb  3.1<L>  /  TBili  0.9  /  DBili  x   /  AST  25  /  ALT  28  /  AlkPhos  119  01-05    PT/INR - ( 05 Jan 2024 17:59 )   PT: 40.7 sec;   INR: 3.61 ratio         PTT - ( 05 Jan 2024 17:59 )  PTT:47.8 sec  Urinalysis Basic - ( 05 Jan 2024 17:59 )    Color: x / Appearance: x / SG: x / pH: x  Gluc: 129 mg/dL / Ketone: x  / Bili: x / Urobili: x   Blood: x / Protein: x / Nitrite: x   Leuk Esterase: x / RBC: x / WBC x   Sq Epi: x / Non Sq Epi: x / Bacteria: x

## 2024-01-05 NOTE — ED ADULT TRIAGE NOTE - SOURCE OF INFORMATION
SUBJECTIVE: 89-year-old female with a past medical history of diabetes, hypertension and hepatitis C presents with daughter. She comes to clinic wanting to know if she had a stroke. For the last 2-3 weeks her speech has not been as clear as usual and she is noticing drooling. She denies balance problems. She denies extremity weakness. She denies numbness. Her daughter is noticing that her handwriting is very small. She has an occasional tremor. She does not take aspirin daily. She has a head CT from March 2018 that shows a right scalp hematoma after a fall. Her last glycohemoglobin was 6.1%. She has a carotid ultrasound scheduled next week. She takes atenolol and spironolactone/hydrochlorothiazide for high blood pressure. She takes metformin for diabetes. She states she is always tired.    Current Outpatient Prescriptions   Medication   • spironolactone-hydrochlorothiazide (ALDACTAZIDE) 25-25 MG per tablet   • metformin (GLUCOPHAGE-XR) 500 MG 24 hr tablet   • ONE TOUCH ULTRA TEST test strip   • aspirin 81 MG tablet   • prednisoLONE acetate (PRED FORTE) 1 % ophthalmic suspension   • ketorolac (ACULAR) 0.5 % ophthalmic solution   • tobramycin (TOBREX) 0.3 % ophthalmic solution   • atenolol (TENORMIN) 25 MG tablet   • DETROL LA 4 MG 24 hr capsule   • tobramycin (TOBREX) 0.3 % ophthalmic solution   • ONETOUCH DELICA LANCETS FINE Misc   • polyethylene glycol (MIRALAX) powder   • ONETOUCH DELICA LANCETS FINE Misc   • Calcium Carbonate-Vitamin D (CALCIUM 600+D3 PO)     No current facility-administered medications for this visit.        OBJECTIVE:   Visit Vitals  /56   Pulse 81   Resp 18   Wt 48.5 kg   LMP 11/23/1968   BMI 20.20 kg/m²     Body mass index is 20.2 kg/m².    GENERAL:  No distress/answers questions but slow/speech is not clear  HEENT:  Moist mucous membranes  CARDIOVASCULAR:  Regular rate and rhythm with systolic ejection murmur/bilateral carotid bruits  LUNG:  Clear to auscultation  ABDOMEN:  Positive  bowel sounds/hernia present  EXTREMITIES:  No edema  Neuro-speech is not clear/decreased sensation right leg/decreased sensation left arm/normal cerebellar function/extraocular muscles intact/gait with some shuffling/no tremor/no arm or leg weakness/smile is symmetric/ no change in sensation on the face    ASSESSMENT/PLAN:    1. Drooling/unclear speech-neurological exam is not consistent with an event-given diabetes/hypertension/lack of regular aspirin/above stated neurological symptoms check brain MRI-daughter is in favor of checking brain MRI-patient states that she is too old and it really doesn't matter if she has had a stroke-I recommended that she take daily aspirin  2. Bilateral carotid bruits-carotid ultrasound has been ordered  3. Abdominal hernia-asymptomatic  4. Diabetes-continue metformin/aspirin  5. Hypertension-continue spironolactone/hydrochlorothiazide and atenolol    I have asked patient's daughter to call me after obtaining brain MRI     Patient/EMS

## 2024-01-05 NOTE — H&P ADULT - ASSESSMENT
urinary retention:  -s/p 16 fr caude insertion by urology in ED  -C/w flomax/finesteride  -dc home with palomino with OP urology F/u  -ua indicative of UTI  -C/w ceftriaxone  -gentle IV hydration  -bladder scan PRN of poor U/O in palomino  -F/u urine cx andsensitivity    Afib:  -holding coumadin for supratherapeutic INR    Full code-discussed with Pts son

## 2024-01-05 NOTE — ED PROVIDER NOTE - PHYSICAL EXAMINATION
GENERAL: no acute respiratory distress   HEAD:  Atraumatic, Normocephalic  EYES: EOMI, PERRLA,   ENT: MMM; oropharynx clear  NECK: Supple, No JVD  CHEST/LUNG: Clear to auscultation bilaterally; No wheeze  HEART: Regular rate and rhythm; No murmurs, rubs, or gallops  ABDOMEN: Soft, Nontender, Nondistended; Bowel sounds present  EXTREMITIES:  2+ Peripheral Pulses, No clubbing, cyanosis, or edema  NEUROLOGY: no focal motor or sensory deficits. 5/5 muscle strength in all extremities.   SKIN: No rashes or lesions  : penis with hypospadia

## 2024-01-05 NOTE — H&P ADULT - HISTORY OF PRESENT ILLNESS
89 y/o M with PMH of Afib on Coumadin,  HTN, macular degeneration, BPH c/b urinary retention presented to ED to be evaluated for urinary retention for 36 hours. ED staff tried to  place in palomino but could not pass throgh. urology consulted who ended up placing 16 fr coude in ED with 800 cc u/o. pt labs indicative of UTI with WBC of 28.23 K . per 2 sons at bedside pt also been pleasantly confused for past few days,

## 2024-01-05 NOTE — ED ADULT NURSE NOTE - NEURO BEHAVIOR
Patient notified as well. Advised patient to call with any changes, worsening/persistent symptoms, questions or concerns. Patient in agreement and verbalized understanding. calm

## 2024-01-05 NOTE — ED ADULT NURSE NOTE - OBJECTIVE STATEMENT
abdominal distention. pt has  a palomino and stopped draining. Bladder scanned 1240 ml urnary retention.  Has stage 3 pressre ulcer on sacral area. Unsteady gait at baseline

## 2024-01-05 NOTE — ED PROVIDER NOTE - CLINICAL SUMMARY MEDICAL DECISION MAKING FREE TEXT BOX
89 y/o M with PMH of Afib on Coumadin,  HTN, macular degeneration, BPH c/b urinary retention presenting with malfunctioning of Craig catheter.   Urology called for assistance in placing catheter   Leukocytosis and gross pyuria/bacteuria concerning for CAUTI-->urine sent off new catheter  Covered with CTX and admitted for complicated UTI  Reviewed prior cultures.

## 2024-01-05 NOTE — CONSULT NOTE ADULT - ASSESSMENT
16 Fr coude catheter placed.  Drained >800cc pink urine    Discussed with Dr. Pineda, and patient's 2 sons.

## 2024-01-05 NOTE — ED PROVIDER NOTE - OBJECTIVE STATEMENT
89 y/o M with PMH of Afib on Coumadin,  HTN, macular degeneration, BPH c/b urinary retention presenting with malfunctioning of Craig catheter.     As per son at bedside, catheter has not been draining since yesterday-->noted 'pink tinged' urine and has been intermittently confused by no fevers/chills. Lives at home with spouse and has HHA. 89 y/o M with PMH of Afib on Coumadin,  HTN, macular degeneration, BPH c/b urinary retention presenting with malfunctioning of Craig catheter.     As per son at bedside, catheter has not been draining since yesterday-->noted 'pink tinged' urine and has been intermittently confused by no fevers/chills. Lives at home with spouse and has HHA.

## 2024-01-06 LAB
ANION GAP SERPL CALC-SCNC: 4 MMOL/L — LOW (ref 5–17)
ANION GAP SERPL CALC-SCNC: 4 MMOL/L — LOW (ref 5–17)
BUN SERPL-MCNC: 36 MG/DL — HIGH (ref 7–23)
BUN SERPL-MCNC: 36 MG/DL — HIGH (ref 7–23)
CALCIUM SERPL-MCNC: 8.6 MG/DL — SIGNIFICANT CHANGE UP (ref 8.5–10.1)
CALCIUM SERPL-MCNC: 8.6 MG/DL — SIGNIFICANT CHANGE UP (ref 8.5–10.1)
CHLORIDE SERPL-SCNC: 108 MMOL/L — SIGNIFICANT CHANGE UP (ref 96–108)
CHLORIDE SERPL-SCNC: 108 MMOL/L — SIGNIFICANT CHANGE UP (ref 96–108)
CO2 SERPL-SCNC: 23 MMOL/L — SIGNIFICANT CHANGE UP (ref 22–31)
CO2 SERPL-SCNC: 23 MMOL/L — SIGNIFICANT CHANGE UP (ref 22–31)
CREAT SERPL-MCNC: 0.75 MG/DL — SIGNIFICANT CHANGE UP (ref 0.5–1.3)
CREAT SERPL-MCNC: 0.75 MG/DL — SIGNIFICANT CHANGE UP (ref 0.5–1.3)
E COLI DNA BLD POS QL NAA+NON-PROBE: SIGNIFICANT CHANGE UP
E COLI DNA BLD POS QL NAA+NON-PROBE: SIGNIFICANT CHANGE UP
EGFR: 84 ML/MIN/1.73M2 — SIGNIFICANT CHANGE UP
EGFR: 84 ML/MIN/1.73M2 — SIGNIFICANT CHANGE UP
GLUCOSE SERPL-MCNC: 89 MG/DL — SIGNIFICANT CHANGE UP (ref 70–99)
GLUCOSE SERPL-MCNC: 89 MG/DL — SIGNIFICANT CHANGE UP (ref 70–99)
GRAM STN FLD: ABNORMAL
GRAM STN FLD: ABNORMAL
HCT VFR BLD CALC: 30.8 % — LOW (ref 39–50)
HCT VFR BLD CALC: 30.8 % — LOW (ref 39–50)
HGB BLD-MCNC: 9.8 G/DL — LOW (ref 13–17)
HGB BLD-MCNC: 9.8 G/DL — LOW (ref 13–17)
INR BLD: 3.52 RATIO — HIGH (ref 0.85–1.18)
INR BLD: 3.52 RATIO — HIGH (ref 0.85–1.18)
MCHC RBC-ENTMCNC: 31.3 PG — SIGNIFICANT CHANGE UP (ref 27–34)
MCHC RBC-ENTMCNC: 31.3 PG — SIGNIFICANT CHANGE UP (ref 27–34)
MCHC RBC-ENTMCNC: 31.8 GM/DL — LOW (ref 32–36)
MCHC RBC-ENTMCNC: 31.8 GM/DL — LOW (ref 32–36)
MCV RBC AUTO: 98.4 FL — SIGNIFICANT CHANGE UP (ref 80–100)
MCV RBC AUTO: 98.4 FL — SIGNIFICANT CHANGE UP (ref 80–100)
METHOD TYPE: SIGNIFICANT CHANGE UP
METHOD TYPE: SIGNIFICANT CHANGE UP
NRBC # BLD: 0 /100 WBCS — SIGNIFICANT CHANGE UP (ref 0–0)
NRBC # BLD: 0 /100 WBCS — SIGNIFICANT CHANGE UP (ref 0–0)
PLATELET # BLD AUTO: 320 K/UL — SIGNIFICANT CHANGE UP (ref 150–400)
PLATELET # BLD AUTO: 320 K/UL — SIGNIFICANT CHANGE UP (ref 150–400)
POTASSIUM SERPL-MCNC: 3.6 MMOL/L — SIGNIFICANT CHANGE UP (ref 3.5–5.3)
POTASSIUM SERPL-MCNC: 3.6 MMOL/L — SIGNIFICANT CHANGE UP (ref 3.5–5.3)
POTASSIUM SERPL-SCNC: 3.6 MMOL/L — SIGNIFICANT CHANGE UP (ref 3.5–5.3)
POTASSIUM SERPL-SCNC: 3.6 MMOL/L — SIGNIFICANT CHANGE UP (ref 3.5–5.3)
PROTHROM AB SERPL-ACNC: 39.7 SEC — HIGH (ref 9.5–13)
PROTHROM AB SERPL-ACNC: 39.7 SEC — HIGH (ref 9.5–13)
RBC # BLD: 3.13 M/UL — LOW (ref 4.2–5.8)
RBC # BLD: 3.13 M/UL — LOW (ref 4.2–5.8)
RBC # FLD: 16.3 % — HIGH (ref 10.3–14.5)
RBC # FLD: 16.3 % — HIGH (ref 10.3–14.5)
SODIUM SERPL-SCNC: 135 MMOL/L — SIGNIFICANT CHANGE UP (ref 135–145)
SODIUM SERPL-SCNC: 135 MMOL/L — SIGNIFICANT CHANGE UP (ref 135–145)
SPECIMEN SOURCE: SIGNIFICANT CHANGE UP
SPECIMEN SOURCE: SIGNIFICANT CHANGE UP
WBC # BLD: 17.47 K/UL — HIGH (ref 3.8–10.5)
WBC # BLD: 17.47 K/UL — HIGH (ref 3.8–10.5)
WBC # FLD AUTO: 17.47 K/UL — HIGH (ref 3.8–10.5)
WBC # FLD AUTO: 17.47 K/UL — HIGH (ref 3.8–10.5)

## 2024-01-06 RX ORDER — CEFTRIAXONE 500 MG/1
2000 INJECTION, POWDER, FOR SOLUTION INTRAMUSCULAR; INTRAVENOUS EVERY 24 HOURS
Refills: 0 | Status: COMPLETED | OUTPATIENT
Start: 2024-01-07 | End: 2024-01-13

## 2024-01-06 RX ADMIN — SODIUM CHLORIDE 84 MILLILITER(S): 9 INJECTION INTRAMUSCULAR; INTRAVENOUS; SUBCUTANEOUS at 06:07

## 2024-01-06 RX ADMIN — CEFTRIAXONE 100 MILLIGRAM(S): 500 INJECTION, POWDER, FOR SOLUTION INTRAMUSCULAR; INTRAVENOUS at 06:07

## 2024-01-06 RX ADMIN — FINASTERIDE 5 MILLIGRAM(S): 5 TABLET, FILM COATED ORAL at 13:00

## 2024-01-06 NOTE — PROGRESS NOTE ADULT - SUBJECTIVE AND OBJECTIVE BOX
Patient is a 89y old  Male who presents with a chief complaint of urinary retention (2024 11:12)        INTERVAL HPI/OVERNIGHT EVENTS:   no complaints  pt seen and examined         Vital Signs Last 24 Hrs  T(C): 36.5 (2024 12:57), Max: 36.7 (2024 06:29)  T(F): 97.7 (2024 12:57), Max: 98.1 (2024 06:29)  HR: 67 (2024 12:57) (62 - 67)  BP: 141/54 (2024 12:57) (122/50 - 155/89)  BP(mean): --  RR: 18 (2024 12:57) (16 - 20)  SpO2: 93% (2024 12:57) (93% - 96%)    Parameters below as of 2024 12:57  Patient On (Oxygen Delivery Method): room air        acetaminophen     Tablet .. 650 milliGRAM(s) Oral every 6 hours PRN  aluminum hydroxide/magnesium hydroxide/simethicone Suspension 30 milliLiter(s) Oral every 4 hours PRN  finasteride 5 milliGRAM(s) Oral daily  melatonin 3 milliGRAM(s) Oral at bedtime PRN  ondansetron Injectable 4 milliGRAM(s) IV Push every 8 hours PRN  sodium chloride 0.9%. 1000 milliLiter(s) IV Continuous <Continuous>      PHYSICAL EXAM:  GENERAL: NAD   EYES: conjunctiva and sclera clear  ENMT: Moist mucous membranes  NECK: Supple, No JVD, Normal thyroid  CHEST/LUNG: non labored, cta b/l  HEART: Regular rate and rhythm; No murmurs, rubs, or gallops  ABDOMEN: Soft, Nontender, Nondistended; Bowel sounds present  : genital trauma noted  EXTREMITIES:  2+ Peripheral Pulses, No clubbing, no cyanosis, no edema  LYMPH: No lymphadenopathy noted  SKIN: No rashes or lesions  NEURO: no focal deficits    Consultant(s) Notes Reviewed:  [x ] YES  [ ] NO  Care Discussed with Consultants/Other Providers [ x] YES  [ ] NO    LABS:                        9.8    17.47 )-----------( 320      ( 2024 16:45 )             30.8     01-05    134<L>  |  102  |  34<H>  ----------------------------<  129<H>  4.3   |  23  |  1.30    Ca    9.1      2024 17:59    TPro  7.8  /  Alb  3.1<L>  /  TBili  0.9  /  DBili  x   /  AST  25  /  ALT  28  /  AlkPhos  119  -05    PT/INR - ( 2024 05:54 )   PT: 39.7 sec;   INR: 3.52 ratio         PTT - ( 2024 17:59 )  PTT:47.8 sec  Urinalysis Basic - ( 2024 19:40 )    Color: Red / Appearance: Turbid / S.015 / pH: x  Gluc: x / Ketone: Negative mg/dL  / Bili: Negative / Urobili: 1.0 mg/dL   Blood: x / Protein: 300 mg/dL / Nitrite: Positive   Leuk Esterase: Large / RBC: Too Numerous to count /HPF / WBC 6 /HPF   Sq Epi: x / Non Sq Epi: x / Bacteria: Moderate /HPF      CAPILLARY BLOOD GLUCOSE            Urinalysis Basic - ( 2024 19:40 )    Color: Red / Appearance: Turbid / S.015 / pH: x  Gluc: x / Ketone: Negative mg/dL  / Bili: Negative / Urobili: 1.0 mg/dL   Blood: x / Protein: 300 mg/dL / Nitrite: Positive   Leuk Esterase: Large / RBC: Too Numerous to count /HPF / WBC 6 /HPF   Sq Epi: x / Non Sq Epi: x / Bacteria: Moderate /HPF        Culture - Blood (collected 2024 19:20)  Source: .Blood Blood-Peripheral  Gram Stain (2024 12:41):    Growth in aerobic bottle: Gram Negative Rods  Preliminary Report (2024 12:42):    Growth in aerobic bottle: Gram Negative Rods    Direct identification is available within approximately 3-5    hours either by Blood Panel Multiplexed PCR or Direct    MALDI-TOF. Details: [email or web address]  Organism: Blood Culture PCR (2024 15:33)  Organism: Blood Culture PCR (2024 15:33)        RADIOLOGY & ADDITIONAL TESTS:    Imaging Personally Reviewed  Reviewed consultants input Patient is a 89y old  Male who presents with a chief complaint of urinary retention (2024 11:12)        INTERVAL HPI/OVERNIGHT EVENTS:   no complaints  pt seen and examined         Vital Signs Last 24 Hrs  T(C): 36.5 (2024 12:57), Max: 36.7 (2024 06:29)  T(F): 97.7 (2024 12:57), Max: 98.1 (2024 06:29)  HR: 67 (2024 12:57) (62 - 67)  BP: 141/54 (2024 12:57) (122/50 - 155/89)  BP(mean): --  RR: 18 (2024 12:57) (16 - 20)  SpO2: 93% (2024 12:57) (93% - 96%)    Parameters below as of 2024 12:57  Patient On (Oxygen Delivery Method): room air        acetaminophen     Tablet .. 650 milliGRAM(s) Oral every 6 hours PRN  aluminum hydroxide/magnesium hydroxide/simethicone Suspension 30 milliLiter(s) Oral every 4 hours PRN  finasteride 5 milliGRAM(s) Oral daily  melatonin 3 milliGRAM(s) Oral at bedtime PRN  ondansetron Injectable 4 milliGRAM(s) IV Push every 8 hours PRN  sodium chloride 0.9%. 1000 milliLiter(s) IV Continuous <Continuous>      PHYSICAL EXAM:  GENERAL: NAD   EYES: conjunctiva and sclera clear  ENMT: Moist mucous membranes  NECK: Supple, No JVD, Normal thyroid  CHEST/LUNG: non labored, cta b/l  HEART: Regular rate and rhythm; No murmurs, rubs, or gallops  ABDOMEN: Soft, Nontender, Nondistended; Bowel sounds present  : genital trauma noted  EXTREMITIES:  2+ Peripheral Pulses, No clubbing, no cyanosis, no edema  LYMPH: No lymphadenopathy noted  SKIN: No rashes or lesions  NEURO: no focal deficits    Consultant(s) Notes Reviewed:  [x ] YES  [ ] NO  Care Discussed with Consultants/Other Providers [ x] YES  [ ] NO    LABS:                        9.8    17.47 )-----------( 320      ( 2024 16:45 )             30.8     01-05    134<L>  |  102  |  34<H>  ----------------------------<  129<H>  4.3   |  23  |  1.30    Ca    9.1      2024 17:59    TPro  7.8  /  Alb  3.1<L>  /  TBili  0.9  /  DBili  x   /  AST  25  /  ALT  28  /  AlkPhos  119  -05    PT/INR - ( 2024 05:54 )   PT: 39.7 sec;   INR: 3.52 ratio         PTT - ( 2024 17:59 )  PTT:47.8 sec  Urinalysis Basic - ( 2024 19:40 )    Color: Red / Appearance: Turbid / S.015 / pH: x  Gluc: x / Ketone: Negative mg/dL  / Bili: Negative / Urobili: 1.0 mg/dL   Blood: x / Protein: 300 mg/dL / Nitrite: Positive   Leuk Esterase: Large / RBC: Too Numerous to count /HPF / WBC 6 /HPF   Sq Epi: x / Non Sq Epi: x / Bacteria: Moderate /HPF      CAPILLARY BLOOD GLUCOSE            Urinalysis Basic - ( 2024 19:40 )    Color: Red / Appearance: Turbid / S.015 / pH: x  Gluc: x / Ketone: Negative mg/dL  / Bili: Negative / Urobili: 1.0 mg/dL   Blood: x / Protein: 300 mg/dL / Nitrite: Positive   Leuk Esterase: Large / RBC: Too Numerous to count /HPF / WBC 6 /HPF   Sq Epi: x / Non Sq Epi: x / Bacteria: Moderate /HPF        Culture - Blood (collected 2024 19:20)  Source: .Blood Blood-Peripheral  Gram Stain (2024 12:41):    Growth in aerobic bottle: Gram Negative Rods  Preliminary Report (2024 12:42):    Growth in aerobic bottle: Gram Negative Rods    Direct identification is available within approximately 3-5    hours either by Blood Panel Multiplexed PCR or Direct    MALDI-TOF. Details: [email or web address]  Organism: Blood Culture PCR (2024 15:33)  Organism: Blood Culture PCR (2024 15:33)        RADIOLOGY & ADDITIONAL TESTS:    Imaging Personally Reviewed  Reviewed consultants input

## 2024-01-06 NOTE — PATIENT PROFILE ADULT - FALL HARM RISK - HARM RISK INTERVENTIONS
Assistance with ambulation/Assistance OOB with selected safe patient handling equipment/Communicate Risk of Fall with Harm to all staff/Discuss with provider need for PT consult/Monitor gait and stability/Provide patient with walking aids - walker, cane, crutches/Reinforce activity limits and safety measures with patient and family/Tailored Fall Risk Interventions/Visual Cue: Yellow wristband and red socks/Bed in lowest position, wheels locked, appropriate side rails in place/Call bell, personal items and telephone in reach/Instruct patient to call for assistance before getting out of bed or chair/Non-slip footwear when patient is out of bed/[place] to call system/Physically safe environment - no spills, clutter or unnecessary equipment/Purposeful Proactive Rounding/Room/bathroom lighting operational, light cord in reach Assistance with ambulation/Assistance OOB with selected safe patient handling equipment/Communicate Risk of Fall with Harm to all staff/Discuss with provider need for PT consult/Monitor gait and stability/Provide patient with walking aids - walker, cane, crutches/Reinforce activity limits and safety measures with patient and family/Tailored Fall Risk Interventions/Visual Cue: Yellow wristband and red socks/Bed in lowest position, wheels locked, appropriate side rails in place/Call bell, personal items and telephone in reach/Instruct patient to call for assistance before getting out of bed or chair/Non-slip footwear when patient is out of bed/[place] to call system/Physically safe environment - no spills, clutter or unnecessary equipment/Purposeful Proactive Rounding/Room/bathroom lighting operational, light cord in reach

## 2024-01-06 NOTE — PROGRESS NOTE ADULT - SUBJECTIVE AND OBJECTIVE BOX
INTERVAL Hx:  No acute events overnight.  Craig placed last night, draining jia urine.  INR still elevated.  Pt pleasantly confused.  On finasteride and Flomax 0.8 for urinary retention.      MEDICATIONS  (STANDING):  cefTRIAXone   IVPB 1000 milliGRAM(s) IV Intermittent every 24 hours  finasteride 5 milliGRAM(s) Oral daily  sodium chloride 0.9%. 1000 milliLiter(s) (84 mL/Hr) IV Continuous <Continuous>  tamsulosin 0.8 milliGRAM(s) Oral at bedtime    MEDICATIONS  (PRN):  acetaminophen     Tablet .. 650 milliGRAM(s) Oral every 6 hours PRN Temp greater or equal to 38C (100.4F), Mild Pain (1 - 3)  aluminum hydroxide/magnesium hydroxide/simethicone Suspension 30 milliLiter(s) Oral every 4 hours PRN Dyspepsia  melatonin 3 milliGRAM(s) Oral at bedtime PRN Insomnia  ondansetron Injectable 4 milliGRAM(s) IV Push every 8 hours PRN Nausea and/or Vomiting        Vital Signs Last 24 Hrs  T(C): 36.4 (06 Jan 2024 08:02), Max: 36.8 (05 Jan 2024 15:02)  T(F): 97.5 (06 Jan 2024 08:02), Max: 98.3 (05 Jan 2024 15:02)  HR: 66 (06 Jan 2024 08:02) (62 - 66)  BP: 122/50 (06 Jan 2024 08:02) (122/50 - 188/64)  BP(mean): --  RR: 20 (06 Jan 2024 08:02) (16 - 20)  SpO2: 96% (06 Jan 2024 08:02) (96% - 97%)    Parameters below as of 06 Jan 2024 08:02  Patient On (Oxygen Delivery Method): room air        PHYSICAL EXAM:    ABDOMEN: soft, NT    Craig: draining jia colored urine    LABS:                        12.3   28.23 )-----------( 418      ( 05 Jan 2024 17:59 )             38.3     01-05    134<L>  |  102  |  34<H>  ----------------------------<  129<H>  4.3   |  23  |  1.30    Ca    9.1      05 Jan 2024 17:59    TPro  7.8  /  Alb  3.1<L>  /  TBili  0.9  /  DBili  x   /  AST  25  /  ALT  28  /  AlkPhos  119  01-05

## 2024-01-06 NOTE — CONSULT NOTE ADULT - ASSESSMENT
89 y/o M with PMH of Afib on Coumadin,  HTN, macular degeneration, BPH c/b urinary retention presented to ED to be evaluated for urinary retention as palomino was not draining    E Coli bacteremia  blood cx from 1/5 PCR positive for E Coli  2/2  source 2/2 urinary retention  coude catheter placed by urology on admission    Recommendations  will increase ceftriaxone dose to 2g daily starting tomorrow  f/u E Coli sensitivities  f/u repeat blood cultures form 1/6    Cyrus Espinoza M.D.  [facility], Division of Infectious Diseases  [phone]  After 5pm on weekdays and all day on weekends - please call [phone]  89 y/o M with PMH of Afib on Coumadin,  HTN, macular degeneration, BPH c/b urinary retention presented to ED to be evaluated for urinary retention as palomino was not draining    E Coli bacteremia  blood cx from 1/5 PCR positive for E Coli  2/2  source 2/2 urinary retention  coude catheter placed by urology on admission    Recommendations  will increase ceftriaxone dose to 2g daily starting tomorrow  f/u E Coli sensitivities  f/u repeat blood cultures form 1/6    Cyrus Espinoza M.D.  [facility], Division of Infectious Diseases  [phone]  After 5pm on weekdays and all day on weekends - please call [phone]  89 y/o M with PMH of Afib on Coumadin,  HTN, macular degeneration, BPH c/b urinary retention presented to ED to be evaluated for urinary retention as palomino was not draining    E Coli bacteremia  blood cx from 1/5 PCR positive for E Coli  2/2  source 2/2 urinary retention  coude catheter placed by urology on admission    Recommendations  will increase ceftriaxone dose to 2g daily starting tomorrow  f/u E Coli sensitivities  f/u repeat blood cultures form 1/6  trend apolonia Espinoza M.D.  [facility], Division of Infectious Diseases  [phone]  After 5pm on weekdays and all day on weekends - please call [phone]  89 y/o M with PMH of Afib on Coumadin,  HTN, macular degeneration, BPH c/b urinary retention presented to ED to be evaluated for urinary retention as palomino was not draining    E Coli bacteremia  blood cx from 1/5 PCR positive for E Coli  2/2  source 2/2 urinary retention  coude catheter placed by urology on admission    Recommendations  will increase ceftriaxone dose to 2g daily starting tomorrow  f/u E Coli sensitivities  f/u repeat blood cultures form 1/6  trend apolonia Espinoza M.D.  [facility], Division of Infectious Diseases  [phone]  After 5pm on weekdays and all day on weekends - please call [phone]

## 2024-01-06 NOTE — PATIENT PROFILE ADULT - FALL HARM RISK - FACTORS
Impaired gait/Impaired vision/IV and/or equipment tethered to patient/Other medical problems/Poor balance/Weakness

## 2024-01-06 NOTE — PROGRESS NOTE ADULT - ASSESSMENT
Urinary retention.     Chronic indwelling palomino changed yesterday.  d/c Flomax  continue Proscar

## 2024-01-06 NOTE — PROGRESS NOTE ADULT - ASSESSMENT
89m with AF on coumadin, HTN, BPH with chronic palomino, p/w urinary retention, sepsis due to gram negative bacteremia and uti    urinary retention/genital trauma  palomino replaced  urology following    sepsis/uti  ecoli bacteremia  on abx  ID following    supratherapeutic INR  hold coumadin for now  daily INRs    af  coumadin on hold

## 2024-01-06 NOTE — ED ADULT NURSE REASSESSMENT NOTE - NS ED NURSE REASSESS COMMENT FT1
Received care this am.  Noted Urine remains foul smelling and blood tinged.  draining well.  Pt alert, confused, eating well.

## 2024-01-06 NOTE — CONSULT NOTE ADULT - SUBJECTIVE AND OBJECTIVE BOX
[facility], Division of Infectious Diseases  JIAN Carrizales S. Shah, Y. Patel, G. Alexis  [phone]  ([phone] - weekdays after 5pm and weekends)    GURINDER TURNER  89y, Male  [id number]    HPI--  HPI:  89 y/o M with PMH of Afib on Coumadin,  HTN, macular degeneration, BPH c/b urinary retention presented to ED to be evaluated for urinary retention for 36 hours. ED staff tried to  place in [place] but could not pass throgh. urology consulted who ended up placing 16 fr coude in ED with 800 cc u/o. pt labs indicative of UTI with WBC of 28.23 K . per 2 sons at bedside pt also been pleasantly confused for past few days,    (2024 21:09)  Patient unable to provide history therefore history obtained from chart review.     ROS: unable to assess 2/2 patient's mentation     Allergies: No Known Allergies    PMH -- HTN (hypertension)    Atrial fibrillation    Degeneration macular      PSH --   FH --   Social History -- unable to assess 2/2 patient's mentation     Physical Exam--  Vital Signs Last 24 Hrs  T(F): 97.7 (2024 12:57), Max: 98.1 (2024 06:29)  HR: 67 (2024 12:57) (62 - 67)  BP: 141/54 (2024 12:57) (122/50 - 155/89)  RR: 18 (2024 12:57) (16 - 20)  SpO2: 93% (2024 12:57) (93% - 96%)  General: nontoxic-appearing, no acute distress  HEENT: anicteric  Lungs: Clear bilaterally without rales  Heart: S1, S2, normal rate.   Abdomen: Soft. Nondistended. Nontender.   : hypospadias, palomino  Neuro: lethargic but arousable  Extremities: No LE edema.   Skin: Warm. Dry. No rash.  Psychiatric: unable to assess    Laboratory & Imaging Data--  CBC:                       9.8    17.47 )-----------( 320      ( 2024 16:45 )             30.8     WBC Count: 17.47 K/uL (24 @ 16:45)  WBC Count: 28.23 K/uL (24 @ 17:59)    CMP:     134<L>  |  102  |  34<H>  ----------------------------<  129<H>  4.3   |  23  |  1.30    Ca    9.1      2024 17:59    TPro  7.8  /  Alb  3.1<L>  /  TBili  0.9  /  DBili  x   /  AST  25  /  ALT  28  /  AlkPhos  119      LIVER FUNCTIONS - ( 2024 17:59 )  Alb: 3.1 g/dL / Pro: 7.8 g/dL / ALK PHOS: 119 U/L / ALT: 28 U/L / AST: 25 U/L / GGT: x           Urinalysis Basic - ( 2024 19:40 )    Color: Red / Appearance: Turbid / S.015 / pH: x  Gluc: x / Ketone: Negative mg/dL  / Bili: Negative / Urobili: 1.0 mg/dL   Blood: x / Protein: 300 mg/dL / Nitrite: Positive   Leuk Esterase: Large / RBC: Too Numerous to count /HPF / WBC 6 /HPF   Sq Epi: x / Non Sq Epi: x / Bacteria: Moderate /HPF      Microbiology:     Culture - Blood (collected 24 @ 19:20)  Source: .Blood Blood-Peripheral  Gram Stain (24 @ 12:41):    Growth in aerobic bottle: Gram Negative Rods  Preliminary Report (24 @ 12:42):    Growth in aerobic bottle: Gram Negative Rods    Direct identification is available within approximately 3-5    hours either by Blood Panel Multiplexed PCR or Direct    MALDI-TOF. Details: [email or web address]  Organism: Blood Culture PCR (24 @ 15:33)  Organism: Blood Culture PCR (24 @ 15:33)      Method Type: PCR      -  Escherichia coli: Detec        Radiology--  ***  Active Medications--  acetaminophen     Tablet .. 650 milliGRAM(s) Oral every 6 hours PRN  aluminum hydroxide/magnesium hydroxide/simethicone Suspension 30 milliLiter(s) Oral every 4 hours PRN  finasteride 5 milliGRAM(s) Oral daily  melatonin 3 milliGRAM(s) Oral at bedtime PRN  ondansetron Injectable 4 milliGRAM(s) IV Push every 8 hours PRN  sodium chloride 0.9%. 1000 milliLiter(s) IV Continuous <Continuous>    Antimicrobials:     Immunologic:    [facility], Division of Infectious Diseases  JIAN Carrizales S. Shah, Y. Patel, G. Alexis  [phone]  ([phone] - weekdays after 5pm and weekends)    GURINDER TURNER  89y, Male  [id number]    HPI--  HPI:  89 y/o M with PMH of Afib on Coumadin,  HTN, macular degeneration, BPH c/b urinary retention presented to ED to be evaluated for urinary retention for 36 hours. ED staff tried to  place in [place] but could not pass throgh. urology consulted who ended up placing 16 fr coude in ED with 800 cc u/o. pt labs indicative of UTI with WBC of 28.23 K . per 2 sons at bedside pt also been pleasantly confused for past few days,    (2024 21:09)  Patient unable to provide history therefore history obtained from chart review.     ROS: unable to assess 2/2 patient's mentation     Allergies: No Known Allergies    PMH -- HTN (hypertension)    Atrial fibrillation    Degeneration macular      PSH --   FH --   Social History -- unable to assess 2/2 patient's mentation     Physical Exam--  Vital Signs Last 24 Hrs  T(F): 97.7 (2024 12:57), Max: 98.1 (2024 06:29)  HR: 67 (2024 12:57) (62 - 67)  BP: 141/54 (2024 12:57) (122/50 - 155/89)  RR: 18 (2024 12:57) (16 - 20)  SpO2: 93% (2024 12:57) (93% - 96%)  General: nontoxic-appearing, no acute distress  HEENT: anicteric  Lungs: Clear bilaterally without rales  Heart: S1, S2, normal rate.   Abdomen: Soft. Nondistended. Nontender.   : hypospadias, palomino  Neuro: lethargic but arousable  Extremities: No LE edema.   Skin: Warm. Dry. No rash.  Psychiatric: unable to assess    Laboratory & Imaging Data--  CBC:                       9.8    17.47 )-----------( 320      ( 2024 16:45 )             30.8     WBC Count: 17.47 K/uL (24 @ 16:45)  WBC Count: 28.23 K/uL (24 @ 17:59)    CMP:     134<L>  |  102  |  34<H>  ----------------------------<  129<H>  4.3   |  23  |  1.30    Ca    9.1      2024 17:59    TPro  7.8  /  Alb  3.1<L>  /  TBili  0.9  /  DBili  x   /  AST  25  /  ALT  28  /  AlkPhos  119      LIVER FUNCTIONS - ( 2024 17:59 )  Alb: 3.1 g/dL / Pro: 7.8 g/dL / ALK PHOS: 119 U/L / ALT: 28 U/L / AST: 25 U/L / GGT: x           Urinalysis Basic - ( 2024 19:40 )    Color: Red / Appearance: Turbid / S.015 / pH: x  Gluc: x / Ketone: Negative mg/dL  / Bili: Negative / Urobili: 1.0 mg/dL   Blood: x / Protein: 300 mg/dL / Nitrite: Positive   Leuk Esterase: Large / RBC: Too Numerous to count /HPF / WBC 6 /HPF   Sq Epi: x / Non Sq Epi: x / Bacteria: Moderate /HPF      Microbiology:     Culture - Blood (collected 24 @ 19:20)  Source: .Blood Blood-Peripheral  Gram Stain (24 @ 12:41):    Growth in aerobic bottle: Gram Negative Rods  Preliminary Report (24 @ 12:42):    Growth in aerobic bottle: Gram Negative Rods    Direct identification is available within approximately 3-5    hours either by Blood Panel Multiplexed PCR or Direct    MALDI-TOF. Details: [email or web address]  Organism: Blood Culture PCR (24 @ 15:33)  Organism: Blood Culture PCR (24 @ 15:33)      Method Type: PCR      -  Escherichia coli: Detec        Radiology--  ***  Active Medications--  acetaminophen     Tablet .. 650 milliGRAM(s) Oral every 6 hours PRN  aluminum hydroxide/magnesium hydroxide/simethicone Suspension 30 milliLiter(s) Oral every 4 hours PRN  finasteride 5 milliGRAM(s) Oral daily  melatonin 3 milliGRAM(s) Oral at bedtime PRN  ondansetron Injectable 4 milliGRAM(s) IV Push every 8 hours PRN  sodium chloride 0.9%. 1000 milliLiter(s) IV Continuous <Continuous>    Antimicrobials:     Immunologic:

## 2024-01-07 LAB
ANION GAP SERPL CALC-SCNC: 4 MMOL/L — LOW (ref 5–17)
ANION GAP SERPL CALC-SCNC: 4 MMOL/L — LOW (ref 5–17)
BUN SERPL-MCNC: 35 MG/DL — HIGH (ref 7–23)
BUN SERPL-MCNC: 35 MG/DL — HIGH (ref 7–23)
CALCIUM SERPL-MCNC: 7.9 MG/DL — LOW (ref 8.5–10.1)
CALCIUM SERPL-MCNC: 7.9 MG/DL — LOW (ref 8.5–10.1)
CHLORIDE SERPL-SCNC: 110 MMOL/L — HIGH (ref 96–108)
CHLORIDE SERPL-SCNC: 110 MMOL/L — HIGH (ref 96–108)
CO2 SERPL-SCNC: 25 MMOL/L — SIGNIFICANT CHANGE UP (ref 22–31)
CO2 SERPL-SCNC: 25 MMOL/L — SIGNIFICANT CHANGE UP (ref 22–31)
CREAT SERPL-MCNC: 0.7 MG/DL — SIGNIFICANT CHANGE UP (ref 0.5–1.3)
CREAT SERPL-MCNC: 0.7 MG/DL — SIGNIFICANT CHANGE UP (ref 0.5–1.3)
CRP SERPL-MCNC: 122 MG/L — HIGH
CRP SERPL-MCNC: 122 MG/L — HIGH
CULTURE RESULTS: SIGNIFICANT CHANGE UP
CULTURE RESULTS: SIGNIFICANT CHANGE UP
EGFR: 86 ML/MIN/1.73M2 — SIGNIFICANT CHANGE UP
EGFR: 86 ML/MIN/1.73M2 — SIGNIFICANT CHANGE UP
GLUCOSE SERPL-MCNC: 116 MG/DL — HIGH (ref 70–99)
GLUCOSE SERPL-MCNC: 116 MG/DL — HIGH (ref 70–99)
HCT VFR BLD CALC: 28.3 % — LOW (ref 39–50)
HCT VFR BLD CALC: 28.3 % — LOW (ref 39–50)
HGB BLD-MCNC: 9.1 G/DL — LOW (ref 13–17)
HGB BLD-MCNC: 9.1 G/DL — LOW (ref 13–17)
INR BLD: 2.28 RATIO — HIGH (ref 0.85–1.18)
INR BLD: 2.28 RATIO — HIGH (ref 0.85–1.18)
MCHC RBC-ENTMCNC: 31.4 PG — SIGNIFICANT CHANGE UP (ref 27–34)
MCHC RBC-ENTMCNC: 31.4 PG — SIGNIFICANT CHANGE UP (ref 27–34)
MCHC RBC-ENTMCNC: 32.2 GM/DL — SIGNIFICANT CHANGE UP (ref 32–36)
MCHC RBC-ENTMCNC: 32.2 GM/DL — SIGNIFICANT CHANGE UP (ref 32–36)
MCV RBC AUTO: 97.6 FL — SIGNIFICANT CHANGE UP (ref 80–100)
MCV RBC AUTO: 97.6 FL — SIGNIFICANT CHANGE UP (ref 80–100)
NRBC # BLD: 0 /100 WBCS — SIGNIFICANT CHANGE UP (ref 0–0)
NRBC # BLD: 0 /100 WBCS — SIGNIFICANT CHANGE UP (ref 0–0)
PLATELET # BLD AUTO: 276 K/UL — SIGNIFICANT CHANGE UP (ref 150–400)
PLATELET # BLD AUTO: 276 K/UL — SIGNIFICANT CHANGE UP (ref 150–400)
POTASSIUM SERPL-MCNC: 3.3 MMOL/L — LOW (ref 3.5–5.3)
POTASSIUM SERPL-MCNC: 3.3 MMOL/L — LOW (ref 3.5–5.3)
POTASSIUM SERPL-SCNC: 3.3 MMOL/L — LOW (ref 3.5–5.3)
POTASSIUM SERPL-SCNC: 3.3 MMOL/L — LOW (ref 3.5–5.3)
PROTHROM AB SERPL-ACNC: 26 SEC — HIGH (ref 9.5–13)
PROTHROM AB SERPL-ACNC: 26 SEC — HIGH (ref 9.5–13)
RBC # BLD: 2.9 M/UL — LOW (ref 4.2–5.8)
RBC # BLD: 2.9 M/UL — LOW (ref 4.2–5.8)
RBC # FLD: 16.2 % — HIGH (ref 10.3–14.5)
RBC # FLD: 16.2 % — HIGH (ref 10.3–14.5)
SODIUM SERPL-SCNC: 139 MMOL/L — SIGNIFICANT CHANGE UP (ref 135–145)
SODIUM SERPL-SCNC: 139 MMOL/L — SIGNIFICANT CHANGE UP (ref 135–145)
SPECIMEN SOURCE: SIGNIFICANT CHANGE UP
SPECIMEN SOURCE: SIGNIFICANT CHANGE UP
WBC # BLD: 12.11 K/UL — HIGH (ref 3.8–10.5)
WBC # BLD: 12.11 K/UL — HIGH (ref 3.8–10.5)
WBC # FLD AUTO: 12.11 K/UL — HIGH (ref 3.8–10.5)
WBC # FLD AUTO: 12.11 K/UL — HIGH (ref 3.8–10.5)

## 2024-01-07 RX ORDER — WARFARIN SODIUM 2.5 MG/1
2 TABLET ORAL ONCE
Refills: 0 | Status: COMPLETED | OUTPATIENT
Start: 2024-01-07 | End: 2024-01-07

## 2024-01-07 RX ADMIN — CEFTRIAXONE 100 MILLIGRAM(S): 500 INJECTION, POWDER, FOR SOLUTION INTRAMUSCULAR; INTRAVENOUS at 06:12

## 2024-01-07 RX ADMIN — SODIUM CHLORIDE 84 MILLILITER(S): 9 INJECTION INTRAMUSCULAR; INTRAVENOUS; SUBCUTANEOUS at 17:49

## 2024-01-07 RX ADMIN — WARFARIN SODIUM 2 MILLIGRAM(S): 2.5 TABLET ORAL at 21:36

## 2024-01-07 RX ADMIN — SODIUM CHLORIDE 84 MILLILITER(S): 9 INJECTION INTRAMUSCULAR; INTRAVENOUS; SUBCUTANEOUS at 05:10

## 2024-01-07 RX ADMIN — FINASTERIDE 5 MILLIGRAM(S): 5 TABLET, FILM COATED ORAL at 11:35

## 2024-01-07 NOTE — PROGRESS NOTE ADULT - ASSESSMENT
89 y/o M with PMH of Afib on Coumadin,  HTN, macular degeneration, BPH c/b urinary retention presented to ED to be evaluated for urinary retention as palomino was not draining    E Coli bacteremia  blood cx from 1/5 PCR positive for E Coli  urine culture contaminated but still suspect 2/2  source 2/2 urinary retention  coude catheter placed by urology on admission    Recommendations  c/w ceftriaxone 2g daily  f/u E Coli sensitivities  f/u repeat blood cultures form 1/6    Cyrus Espinoza M.D.  [facility], Division of Infectious Diseases  [phone]  After 5pm on weekdays and all day on weekends - please call [phone]  89 y/o M with PMH of Afib on Coumadin,  HTN, macular degeneration, BPH c/b urinary retention presented to ED to be evaluated for urinary retention as palomino was not draining    E Coli bacteremia  blood cx from 1/5 PCR positive for E Coli  urine culture contaminated but still suspect 2/2  source 2/2 urinary retention  coude catheter placed by urology on admission    Recommendations  c/w ceftriaxone 2g daily  f/u E Coli sensitivities  f/u repeat blood cultures form 1/6    Cyrus Espinoza M.D.  [facility], Division of Infectious Diseases  [phone]  After 5pm on weekdays and all day on weekends - please call [phone]

## 2024-01-07 NOTE — PROGRESS NOTE ADULT - SUBJECTIVE AND OBJECTIVE BOX
[facility], Division of Infectious Diseases  JIAN Carrizales Y. Patel, S. Shah, G. Alexis  [phone]  ([phone] - weekdays after 5pm and weekends)    Name: GURINDER TURNER  Age/Gender: 89y Male  MRN: [id number]    Interval History:  Notes reviewed.   No concerning overnight events.  Afebrile.     Allergies: No Known Allergies      Objective:  Vitals:   T(F): 98.6 (01-07-24 @ 19:52), Max: 98.6 (01-06-24 @ 20:41)  HR: 70 (01-07-24 @ 19:52) (63 - 71)  BP: 171/58 (01-07-24 @ 19:52) (151/57 - 171/58)  RR: 18 (01-07-24 @ 19:52) (18 - 18)  SpO2: 90% (01-07-24 @ 19:52) (90% - 97%)  Physical Examination:  General: no acute distress  HEENT: anicteric  Cardio: normal rate  Resp: breathing comfortably on RA  Abd: soft, ND, NT  : palomino  Ext: no LE edema     Laboratory Studies:  CBC:                       9.1    12.11 )-----------( 276      ( 07 Jan 2024 06:25 )             28.3     WBC Trend:  12.11 01-07-24 @ 06:25  17.47 01-06-24 @ 16:45  28.23 01-05-24 @ 17:59    CMP: 01-07    139  |  110<H>  |  35<H>  ----------------------------<  116<H>  3.3<L>   |  25  |  0.70    Ca    7.9<L>      07 Jan 2024 06:25            Urinalysis Basic - ( 07 Jan 2024 06:25 )    Color: x / Appearance: x / SG: x / pH: x  Gluc: 116 mg/dL / Ketone: x  / Bili: x / Urobili: x   Blood: x / Protein: x / Nitrite: x   Leuk Esterase: x / RBC: x / WBC x   Sq Epi: x / Non Sq Epi: x / Bacteria: x      Microbiology: reviewed     Culture - Blood (collected 01-06-24 @ 05:54)  Source: .Blood Blood-Venous  Preliminary Report (01-07-24 @ 14:01):    No growth at 24 hours    Culture - Urine (collected 01-05-24 @ 19:40)  Source: Catheterized Catheterized  Final Report (01-07-24 @ 11:02):    >=3 organisms. Probable collection contamination.    Culture - Blood (collected 01-05-24 @ 19:20)  Source: .Blood Blood-Peripheral  Gram Stain (01-06-24 @ 12:41):    Growth in aerobic bottle: Gram Negative Rods  Preliminary Report (01-07-24 @ 13:50):    Growth in aerobic bottle: Escherichia coli    Direct identification is available within approximately 3-5    hours either by Blood Panel Multiplexed PCR or Direct    MALDI-TOF. Details: [email or web address]  Organism: Blood Culture PCR (01-06-24 @ 15:33)  Organism: Blood Culture PCR (01-06-24 @ 15:33)      Method Type: PCR      -  Escherichia coli: Detec        Radiology: reviewed     Medications:  acetaminophen     Tablet .. 650 milliGRAM(s) Oral every 6 hours PRN  aluminum hydroxide/magnesium hydroxide/simethicone Suspension 30 milliLiter(s) Oral every 4 hours PRN  cefTRIAXone   IVPB 2000 milliGRAM(s) IV Intermittent every 24 hours  finasteride 5 milliGRAM(s) Oral daily  melatonin 3 milliGRAM(s) Oral at bedtime PRN  ondansetron Injectable 4 milliGRAM(s) IV Push every 8 hours PRN  sodium chloride 0.9%. 1000 milliLiter(s) IV Continuous <Continuous>  warfarin 2 milliGRAM(s) Oral once    Antimicrobials:  cefTRIAXone   IVPB 2000 milliGRAM(s) IV Intermittent every 24 hours   [facility], Division of Infectious Diseases  JIAN Carrizales Y. Patel, S. Shah, G. Alexis  [phone]  ([phone] - weekdays after 5pm and weekends)    Name: GURINDER TURNER  Age/Gender: 89y Male  MRN: [id number]    Interval History:  Notes reviewed.   No concerning overnight events.  Afebrile.     Allergies: No Known Allergies      Objective:  Vitals:   T(F): 98.6 (01-07-24 @ 19:52), Max: 98.6 (01-06-24 @ 20:41)  HR: 70 (01-07-24 @ 19:52) (63 - 71)  BP: 171/58 (01-07-24 @ 19:52) (151/57 - 171/58)  RR: 18 (01-07-24 @ 19:52) (18 - 18)  SpO2: 90% (01-07-24 @ 19:52) (90% - 97%)  Physical Examination:  General: no acute distress  HEENT: anicteric  Cardio: normal rate  Resp: breathing comfortably on RA  Abd: soft, ND, NT  : palomino  Ext: no LE edema     Laboratory Studies:  CBC:                       9.1    12.11 )-----------( 276      ( 07 Jan 2024 06:25 )             28.3     WBC Trend:  12.11 01-07-24 @ 06:25  17.47 01-06-24 @ 16:45  28.23 01-05-24 @ 17:59    CMP: 01-07    139  |  110<H>  |  35<H>  ----------------------------<  116<H>  3.3<L>   |  25  |  0.70    Ca    7.9<L>      07 Jan 2024 06:25            Urinalysis Basic - ( 07 Jan 2024 06:25 )    Color: x / Appearance: x / SG: x / pH: x  Gluc: 116 mg/dL / Ketone: x  / Bili: x / Urobili: x   Blood: x / Protein: x / Nitrite: x   Leuk Esterase: x / RBC: x / WBC x   Sq Epi: x / Non Sq Epi: x / Bacteria: x      Microbiology: reviewed     Culture - Blood (collected 01-06-24 @ 05:54)  Source: .Blood Blood-Venous  Preliminary Report (01-07-24 @ 14:01):    No growth at 24 hours    Culture - Urine (collected 01-05-24 @ 19:40)  Source: Catheterized Catheterized  Final Report (01-07-24 @ 11:02):    >=3 organisms. Probable collection contamination.    Culture - Blood (collected 01-05-24 @ 19:20)  Source: .Blood Blood-Peripheral  Gram Stain (01-06-24 @ 12:41):    Growth in aerobic bottle: Gram Negative Rods  Preliminary Report (01-07-24 @ 13:50):    Growth in aerobic bottle: Escherichia coli    Direct identification is available within approximately 3-5    hours either by Blood Panel Multiplexed PCR or Direct    MALDI-TOF. Details: [email or web address]  Organism: Blood Culture PCR (01-06-24 @ 15:33)  Organism: Blood Culture PCR (01-06-24 @ 15:33)      Method Type: PCR      -  Escherichia coli: Detec        Radiology: reviewed     Medications:  acetaminophen     Tablet .. 650 milliGRAM(s) Oral every 6 hours PRN  aluminum hydroxide/magnesium hydroxide/simethicone Suspension 30 milliLiter(s) Oral every 4 hours PRN  cefTRIAXone   IVPB 2000 milliGRAM(s) IV Intermittent every 24 hours  finasteride 5 milliGRAM(s) Oral daily  melatonin 3 milliGRAM(s) Oral at bedtime PRN  ondansetron Injectable 4 milliGRAM(s) IV Push every 8 hours PRN  sodium chloride 0.9%. 1000 milliLiter(s) IV Continuous <Continuous>  warfarin 2 milliGRAM(s) Oral once    Antimicrobials:  cefTRIAXone   IVPB 2000 milliGRAM(s) IV Intermittent every 24 hours

## 2024-01-07 NOTE — PROGRESS NOTE ADULT - SUBJECTIVE AND OBJECTIVE BOX
Patient is a 89y old  Male who presents with a chief complaint of urinary retention (07 Jan 2024 07:52)        INTERVAL HPI/OVERNIGHT EVENTS:   no complaints  pt seen and examined         Vital Signs Last 24 Hrs  T(C): 36.9 (07 Jan 2024 12:13), Max: 37 (06 Jan 2024 20:41)  T(F): 98.5 (07 Jan 2024 12:13), Max: 98.6 (06 Jan 2024 20:41)  HR: 63 (07 Jan 2024 12:13) (63 - 71)  BP: 152/66 (07 Jan 2024 12:13) (151/57 - 167/65)  BP(mean): --  RR: 18 (07 Jan 2024 12:13) (18 - 18)  SpO2: 96% (07 Jan 2024 12:13) (93% - 97%)    Parameters below as of 07 Jan 2024 12:13  Patient On (Oxygen Delivery Method): room air        acetaminophen     Tablet .. 650 milliGRAM(s) Oral every 6 hours PRN  aluminum hydroxide/magnesium hydroxide/simethicone Suspension 30 milliLiter(s) Oral every 4 hours PRN  cefTRIAXone   IVPB 2000 milliGRAM(s) IV Intermittent every 24 hours  finasteride 5 milliGRAM(s) Oral daily  melatonin 3 milliGRAM(s) Oral at bedtime PRN  ondansetron Injectable 4 milliGRAM(s) IV Push every 8 hours PRN  sodium chloride 0.9%. 1000 milliLiter(s) IV Continuous <Continuous>  warfarin 2 milliGRAM(s) Oral once      PHYSICAL EXAM:  GENERAL: NAD   EYES: conjunctiva and sclera clear  ENMT: Moist mucous membranes  NECK: Supple, No JVD, Normal thyroid  CHEST/LUNG: non labored, cta b/l  HEART: Regular rate and rhythm; No murmurs, rubs, or gallops  ABDOMEN: Soft, Nontender, Nondistended; Bowel sounds present  EXTREMITIES:  2+ Peripheral Pulses, No clubbing, no cyanosis, no edema  LYMPH: No lymphadenopathy noted  SKIN: No rashes or lesions  NEURO: no focal deficits    Consultant(s) Notes Reviewed:  [x ] YES  [ ] NO  Care Discussed with Consultants/Other Providers [ x] YES  [ ] NO    LABS:                        9.1    12.11 )-----------( 276      ( 07 Jan 2024 06:25 )             28.3     01-07    139  |  110<H>  |  35<H>  ----------------------------<  116<H>  3.3<L>   |  25  |  0.70    Ca    7.9<L>      07 Jan 2024 06:25      PT/INR - ( 07 Jan 2024 06:25 )   PT: 26.0 sec;   INR: 2.28 ratio           Urinalysis Basic - ( 07 Jan 2024 06:25 )    Color: x / Appearance: x / SG: x / pH: x  Gluc: 116 mg/dL / Ketone: x  / Bili: x / Urobili: x   Blood: x / Protein: x / Nitrite: x   Leuk Esterase: x / RBC: x / WBC x   Sq Epi: x / Non Sq Epi: x / Bacteria: x      CAPILLARY BLOOD GLUCOSE            Urinalysis Basic - ( 07 Jan 2024 06:25 )    Color: x / Appearance: x / SG: x / pH: x  Gluc: 116 mg/dL / Ketone: x  / Bili: x / Urobili: x   Blood: x / Protein: x / Nitrite: x   Leuk Esterase: x / RBC: x / WBC x   Sq Epi: x / Non Sq Epi: x / Bacteria: x        Culture - Blood (collected 06 Jan 2024 05:54)  Source: .Blood Blood-Venous  Preliminary Report (07 Jan 2024 14:01):    No growth at 24 hours    Culture - Urine (collected 05 Jan 2024 19:40)  Source: Catheterized Catheterized  Final Report (07 Jan 2024 11:02):    >=3 organisms. Probable collection contamination.    Culture - Blood (collected 05 Jan 2024 19:20)  Source: .Blood Blood-Peripheral  Gram Stain (06 Jan 2024 12:41):    Growth in aerobic bottle: Gram Negative Rods  Preliminary Report (07 Jan 2024 13:50):    Growth in aerobic bottle: Escherichia coli    Direct identification is available within approximately 3-5    hours either by Blood Panel Multiplexed PCR or Direct    MALDI-TOF. Details: [email or web address]  Organism: Blood Culture PCR (06 Jan 2024 15:33)  Organism: Blood Culture PCR (06 Jan 2024 15:33)        RADIOLOGY & ADDITIONAL TESTS:    Imaging Personally Reviewed  Reviewed consultants input Patient is a 89y old  Male who presents with a chief complaint of urinary retention (07 Jan 2024 07:52)        INTERVAL HPI/OVERNIGHT EVENTS:   no complaints  pt seen and examined         Vital Signs Last 24 Hrs  T(C): 36.9 (07 Jan 2024 12:13), Max: 37 (06 Jan 2024 20:41)  T(F): 98.5 (07 Jan 2024 12:13), Max: 98.6 (06 Jan 2024 20:41)  HR: 63 (07 Jan 2024 12:13) (63 - 71)  BP: 152/66 (07 Jan 2024 12:13) (151/57 - 167/65)  BP(mean): --  RR: 18 (07 Jan 2024 12:13) (18 - 18)  SpO2: 96% (07 Jan 2024 12:13) (93% - 97%)    Parameters below as of 07 Jan 2024 12:13  Patient On (Oxygen Delivery Method): room air        acetaminophen     Tablet .. 650 milliGRAM(s) Oral every 6 hours PRN  aluminum hydroxide/magnesium hydroxide/simethicone Suspension 30 milliLiter(s) Oral every 4 hours PRN  cefTRIAXone   IVPB 2000 milliGRAM(s) IV Intermittent every 24 hours  finasteride 5 milliGRAM(s) Oral daily  melatonin 3 milliGRAM(s) Oral at bedtime PRN  ondansetron Injectable 4 milliGRAM(s) IV Push every 8 hours PRN  sodium chloride 0.9%. 1000 milliLiter(s) IV Continuous <Continuous>  warfarin 2 milliGRAM(s) Oral once      PHYSICAL EXAM:  GENERAL: NAD   EYES: conjunctiva and sclera clear  ENMT: Moist mucous membranes  NECK: Supple, No JVD, Normal thyroid  CHEST/LUNG: non labored, cta b/l  HEART: Regular rate and rhythm; No murmurs, rubs, or gallops  ABDOMEN: Soft, Nontender, Nondistended; Bowel sounds present  EXTREMITIES:  2+ Peripheral Pulses, No clubbing, no cyanosis, no edema  LYMPH: No lymphadenopathy noted  SKIN: No rashes or lesions  NEURO: no focal deficits    Consultant(s) Notes Reviewed:  [x ] YES  [ ] NO  Care Discussed with Consultants/Other Providers [ x] YES  [ ] NO    LABS:                        9.1    12.11 )-----------( 276      ( 07 Jan 2024 06:25 )             28.3     01-07    139  |  110<H>  |  35<H>  ----------------------------<  116<H>  3.3<L>   |  25  |  0.70    Ca    7.9<L>      07 Jan 2024 06:25      PT/INR - ( 07 Jan 2024 06:25 )   PT: 26.0 sec;   INR: 2.28 ratio           Urinalysis Basic - ( 07 Jan 2024 06:25 )    Color: x / Appearance: x / SG: x / pH: x  Gluc: 116 mg/dL / Ketone: x  / Bili: x / Urobili: x   Blood: x / Protein: x / Nitrite: x   Leuk Esterase: x / RBC: x / WBC x   Sq Epi: x / Non Sq Epi: x / Bacteria: x      CAPILLARY BLOOD GLUCOSE            Urinalysis Basic - ( 07 Jan 2024 06:25 )    Color: x / Appearance: x / SG: x / pH: x  Gluc: 116 mg/dL / Ketone: x  / Bili: x / Urobili: x   Blood: x / Protein: x / Nitrite: x   Leuk Esterase: x / RBC: x / WBC x   Sq Epi: x / Non Sq Epi: x / Bacteria: x        Culture - Blood (collected 06 Jan 2024 05:54)  Source: .Blood Blood-Venous  Preliminary Report (07 Jan 2024 14:01):    No growth at 24 hours    Culture - Urine (collected 05 Jan 2024 19:40)  Source: Catheterized Catheterized  Final Report (07 Jan 2024 11:02):    >=3 organisms. Probable collection contamination.    Culture - Blood (collected 05 Jan 2024 19:20)  Source: .Blood Blood-Peripheral  Gram Stain (06 Jan 2024 12:41):    Growth in aerobic bottle: Gram Negative Rods  Preliminary Report (07 Jan 2024 13:50):    Growth in aerobic bottle: Escherichia coli    Direct identification is available within approximately 3-5    hours either by Blood Panel Multiplexed PCR or Direct    MALDI-TOF. Details: [email or web address]  Organism: Blood Culture PCR (06 Jan 2024 15:33)  Organism: Blood Culture PCR (06 Jan 2024 15:33)        RADIOLOGY & ADDITIONAL TESTS:    Imaging Personally Reviewed  Reviewed consultants input

## 2024-01-07 NOTE — PROGRESS NOTE ADULT - SUBJECTIVE AND OBJECTIVE BOX
INTERVAL Hx:  No acute events overnight.  ID note appreciated.  Renal indices and WBC improving.  Urine culture growing GNR.    MEDICATIONS  (STANDING):  cefTRIAXone   IVPB 2000 milliGRAM(s) IV Intermittent every 24 hours  finasteride 5 milliGRAM(s) Oral daily  sodium chloride 0.9%. 1000 milliLiter(s) (84 mL/Hr) IV Continuous <Continuous>    MEDICATIONS  (PRN):  acetaminophen     Tablet .. 650 milliGRAM(s) Oral every 6 hours PRN Temp greater or equal to 38C (100.4F), Mild Pain (1 - 3)  aluminum hydroxide/magnesium hydroxide/simethicone Suspension 30 milliLiter(s) Oral every 4 hours PRN Dyspepsia  melatonin 3 milliGRAM(s) Oral at bedtime PRN Insomnia  ondansetron Injectable 4 milliGRAM(s) IV Push every 8 hours PRN Nausea and/or Vomiting        Vital Signs Last 24 Hrs  T(C): 36.8 (07 Jan 2024 05:09), Max: 37 (06 Jan 2024 20:41)  T(F): 98.3 (07 Jan 2024 05:09), Max: 98.6 (06 Jan 2024 20:41)  HR: 71 (07 Jan 2024 05:09) (66 - 71)  BP: 167/65 (07 Jan 2024 05:09) (122/50 - 167/65)  BP(mean): --  RR: 18 (07 Jan 2024 05:09) (18 - 20)  SpO2: 97% (07 Jan 2024 05:09) (93% - 97%)    Parameters below as of 07 Jan 2024 05:09  Patient On (Oxygen Delivery Method): room air        PHYSICAL EXAM:      Craig: draining clear, yellow urine    LABS:                        9.1    12.11 )-----------( 276      ( 07 Jan 2024 06:25 )             28.3     01-06    135  |  108  |  36<H>  ----------------------------<  89  3.6   |  23  |  0.75    Ca    8.6      06 Jan 2024 16:45    TPro  7.8  /  Alb  3.1<L>  /  TBili  0.9  /  DBili  x   /  AST  25  /  ALT  28  /  AlkPhos  119  01-05    Urine culture:  01-05 @ 19:20 --   Growth in aerobic bottle: Gram Negative Rods  Direct identification is available within approximately 3-5  hours either by Blood Panel Multiplexed PCR or Direct  MALDI-TOF. Details: [email or web address]   INTERVAL Hx:  No acute events overnight.  ID note appreciated.  Renal indices and WBC improving.  Urine culture growing GNR.    MEDICATIONS  (STANDING):  cefTRIAXone   IVPB 2000 milliGRAM(s) IV Intermittent every 24 hours  finasteride 5 milliGRAM(s) Oral daily  sodium chloride 0.9%. 1000 milliLiter(s) (84 mL/Hr) IV Continuous <Continuous>    MEDICATIONS  (PRN):  acetaminophen     Tablet .. 650 milliGRAM(s) Oral every 6 hours PRN Temp greater or equal to 38C (100.4F), Mild Pain (1 - 3)  aluminum hydroxide/magnesium hydroxide/simethicone Suspension 30 milliLiter(s) Oral every 4 hours PRN Dyspepsia  melatonin 3 milliGRAM(s) Oral at bedtime PRN Insomnia  ondansetron Injectable 4 milliGRAM(s) IV Push every 8 hours PRN Nausea and/or Vomiting        Vital Signs Last 24 Hrs  T(C): 36.8 (07 Jan 2024 05:09), Max: 37 (06 Jan 2024 20:41)  T(F): 98.3 (07 Jan 2024 05:09), Max: 98.6 (06 Jan 2024 20:41)  HR: 71 (07 Jan 2024 05:09) (66 - 71)  BP: 167/65 (07 Jan 2024 05:09) (122/50 - 167/65)  BP(mean): --  RR: 18 (07 Jan 2024 05:09) (18 - 20)  SpO2: 97% (07 Jan 2024 05:09) (93% - 97%)    Parameters below as of 07 Jan 2024 05:09  Patient On (Oxygen Delivery Method): room air        PHYSICAL EXAM:      Craig: draining clear, yellow urine    LABS:                        9.1    12.11 )-----------( 276      ( 07 Jan 2024 06:25 )             28.3     01-06    135  |  108  |  36<H>  ----------------------------<  89  3.6   |  23  |  0.75    Ca    8.6      06 Jan 2024 16:45    TPro  7.8  /  Alb  3.1<L>  /  TBili  0.9  /  DBili  x   /  AST  25  /  ALT  28  /  AlkPhos  119  01-05    Urine culture:  01-05 @ 19:20 --   Growth in aerobic bottle: Gram Negative Rods  Direct identification is available within approximately 3-5  hours either by Blood Panel Multiplexed PCR or Direct  MALDI-TOF. Details: [email or web address]

## 2024-01-08 LAB
-  AMPICILLIN/SULBACTAM: SIGNIFICANT CHANGE UP
-  AMPICILLIN/SULBACTAM: SIGNIFICANT CHANGE UP
-  AMPICILLIN: SIGNIFICANT CHANGE UP
-  AMPICILLIN: SIGNIFICANT CHANGE UP
-  AZTREONAM: SIGNIFICANT CHANGE UP
-  AZTREONAM: SIGNIFICANT CHANGE UP
-  CEFAZOLIN: SIGNIFICANT CHANGE UP
-  CEFAZOLIN: SIGNIFICANT CHANGE UP
-  CEFEPIME: SIGNIFICANT CHANGE UP
-  CEFEPIME: SIGNIFICANT CHANGE UP
-  CEFOXITIN: SIGNIFICANT CHANGE UP
-  CEFOXITIN: SIGNIFICANT CHANGE UP
-  CEFTRIAXONE: SIGNIFICANT CHANGE UP
-  CEFTRIAXONE: SIGNIFICANT CHANGE UP
-  CIPROFLOXACIN: SIGNIFICANT CHANGE UP
-  CIPROFLOXACIN: SIGNIFICANT CHANGE UP
-  ERTAPENEM: SIGNIFICANT CHANGE UP
-  ERTAPENEM: SIGNIFICANT CHANGE UP
-  GENTAMICIN: SIGNIFICANT CHANGE UP
-  GENTAMICIN: SIGNIFICANT CHANGE UP
-  IMIPENEM: SIGNIFICANT CHANGE UP
-  IMIPENEM: SIGNIFICANT CHANGE UP
-  LEVOFLOXACIN: SIGNIFICANT CHANGE UP
-  LEVOFLOXACIN: SIGNIFICANT CHANGE UP
-  MEROPENEM: SIGNIFICANT CHANGE UP
-  MEROPENEM: SIGNIFICANT CHANGE UP
-  PIPERACILLIN/TAZOBACTAM: SIGNIFICANT CHANGE UP
-  PIPERACILLIN/TAZOBACTAM: SIGNIFICANT CHANGE UP
-  TOBRAMYCIN: SIGNIFICANT CHANGE UP
-  TOBRAMYCIN: SIGNIFICANT CHANGE UP
-  TRIMETHOPRIM/SULFAMETHOXAZOLE: SIGNIFICANT CHANGE UP
-  TRIMETHOPRIM/SULFAMETHOXAZOLE: SIGNIFICANT CHANGE UP
ANION GAP SERPL CALC-SCNC: 6 MMOL/L — SIGNIFICANT CHANGE UP (ref 5–17)
ANION GAP SERPL CALC-SCNC: 6 MMOL/L — SIGNIFICANT CHANGE UP (ref 5–17)
BUN SERPL-MCNC: 28 MG/DL — HIGH (ref 7–23)
BUN SERPL-MCNC: 28 MG/DL — HIGH (ref 7–23)
CALCIUM SERPL-MCNC: 7.8 MG/DL — LOW (ref 8.5–10.1)
CALCIUM SERPL-MCNC: 7.8 MG/DL — LOW (ref 8.5–10.1)
CHLORIDE SERPL-SCNC: 113 MMOL/L — HIGH (ref 96–108)
CHLORIDE SERPL-SCNC: 113 MMOL/L — HIGH (ref 96–108)
CO2 SERPL-SCNC: 23 MMOL/L — SIGNIFICANT CHANGE UP (ref 22–31)
CO2 SERPL-SCNC: 23 MMOL/L — SIGNIFICANT CHANGE UP (ref 22–31)
CREAT SERPL-MCNC: 0.59 MG/DL — SIGNIFICANT CHANGE UP (ref 0.5–1.3)
CREAT SERPL-MCNC: 0.59 MG/DL — SIGNIFICANT CHANGE UP (ref 0.5–1.3)
CULTURE RESULTS: ABNORMAL
CULTURE RESULTS: ABNORMAL
EGFR: 90 ML/MIN/1.73M2 — SIGNIFICANT CHANGE UP
EGFR: 90 ML/MIN/1.73M2 — SIGNIFICANT CHANGE UP
GLUCOSE SERPL-MCNC: 84 MG/DL — SIGNIFICANT CHANGE UP (ref 70–99)
GLUCOSE SERPL-MCNC: 84 MG/DL — SIGNIFICANT CHANGE UP (ref 70–99)
HCT VFR BLD CALC: 27.4 % — LOW (ref 39–50)
HCT VFR BLD CALC: 27.4 % — LOW (ref 39–50)
HGB BLD-MCNC: 8.9 G/DL — LOW (ref 13–17)
HGB BLD-MCNC: 8.9 G/DL — LOW (ref 13–17)
INR BLD: 1.56 RATIO — HIGH (ref 0.85–1.18)
INR BLD: 1.56 RATIO — HIGH (ref 0.85–1.18)
MCHC RBC-ENTMCNC: 31.4 PG — SIGNIFICANT CHANGE UP (ref 27–34)
MCHC RBC-ENTMCNC: 31.4 PG — SIGNIFICANT CHANGE UP (ref 27–34)
MCHC RBC-ENTMCNC: 32.5 GM/DL — SIGNIFICANT CHANGE UP (ref 32–36)
MCHC RBC-ENTMCNC: 32.5 GM/DL — SIGNIFICANT CHANGE UP (ref 32–36)
MCV RBC AUTO: 96.8 FL — SIGNIFICANT CHANGE UP (ref 80–100)
MCV RBC AUTO: 96.8 FL — SIGNIFICANT CHANGE UP (ref 80–100)
METHOD TYPE: SIGNIFICANT CHANGE UP
METHOD TYPE: SIGNIFICANT CHANGE UP
NRBC # BLD: 0 /100 WBCS — SIGNIFICANT CHANGE UP (ref 0–0)
NRBC # BLD: 0 /100 WBCS — SIGNIFICANT CHANGE UP (ref 0–0)
ORGANISM # SPEC MICROSCOPIC CNT: ABNORMAL
ORGANISM # SPEC MICROSCOPIC CNT: SIGNIFICANT CHANGE UP
ORGANISM # SPEC MICROSCOPIC CNT: SIGNIFICANT CHANGE UP
PLATELET # BLD AUTO: 257 K/UL — SIGNIFICANT CHANGE UP (ref 150–400)
PLATELET # BLD AUTO: 257 K/UL — SIGNIFICANT CHANGE UP (ref 150–400)
POTASSIUM SERPL-MCNC: 3.3 MMOL/L — LOW (ref 3.5–5.3)
POTASSIUM SERPL-MCNC: 3.3 MMOL/L — LOW (ref 3.5–5.3)
POTASSIUM SERPL-SCNC: 3.3 MMOL/L — LOW (ref 3.5–5.3)
POTASSIUM SERPL-SCNC: 3.3 MMOL/L — LOW (ref 3.5–5.3)
PROTHROM AB SERPL-ACNC: 18 SEC — HIGH (ref 9.5–13)
PROTHROM AB SERPL-ACNC: 18 SEC — HIGH (ref 9.5–13)
RBC # BLD: 2.83 M/UL — LOW (ref 4.2–5.8)
RBC # BLD: 2.83 M/UL — LOW (ref 4.2–5.8)
RBC # FLD: 15.8 % — HIGH (ref 10.3–14.5)
RBC # FLD: 15.8 % — HIGH (ref 10.3–14.5)
SODIUM SERPL-SCNC: 142 MMOL/L — SIGNIFICANT CHANGE UP (ref 135–145)
SODIUM SERPL-SCNC: 142 MMOL/L — SIGNIFICANT CHANGE UP (ref 135–145)
SPECIMEN SOURCE: SIGNIFICANT CHANGE UP
SPECIMEN SOURCE: SIGNIFICANT CHANGE UP
WBC # BLD: 7.9 K/UL — SIGNIFICANT CHANGE UP (ref 3.8–10.5)
WBC # BLD: 7.9 K/UL — SIGNIFICANT CHANGE UP (ref 3.8–10.5)
WBC # FLD AUTO: 7.9 K/UL — SIGNIFICANT CHANGE UP (ref 3.8–10.5)
WBC # FLD AUTO: 7.9 K/UL — SIGNIFICANT CHANGE UP (ref 3.8–10.5)

## 2024-01-08 RX ORDER — AMLODIPINE BESYLATE 2.5 MG/1
10 TABLET ORAL ONCE
Refills: 0 | Status: COMPLETED | OUTPATIENT
Start: 2024-01-08 | End: 2024-01-08

## 2024-01-08 RX ORDER — POTASSIUM CHLORIDE 20 MEQ
40 PACKET (EA) ORAL ONCE
Refills: 0 | Status: COMPLETED | OUTPATIENT
Start: 2024-01-08 | End: 2024-01-08

## 2024-01-08 RX ORDER — WARFARIN SODIUM 2.5 MG/1
3 TABLET ORAL ONCE
Refills: 0 | Status: COMPLETED | OUTPATIENT
Start: 2024-01-08 | End: 2024-01-08

## 2024-01-08 RX ADMIN — WARFARIN SODIUM 3 MILLIGRAM(S): 2.5 TABLET ORAL at 21:56

## 2024-01-08 RX ADMIN — AMLODIPINE BESYLATE 10 MILLIGRAM(S): 2.5 TABLET ORAL at 23:10

## 2024-01-08 RX ADMIN — Medication 40 MILLIEQUIVALENT(S): at 17:58

## 2024-01-08 RX ADMIN — CEFTRIAXONE 100 MILLIGRAM(S): 500 INJECTION, POWDER, FOR SOLUTION INTRAMUSCULAR; INTRAVENOUS at 05:43

## 2024-01-08 RX ADMIN — FINASTERIDE 5 MILLIGRAM(S): 5 TABLET, FILM COATED ORAL at 11:25

## 2024-01-08 NOTE — PROVIDER CONTACT NOTE (OTHER) - ASSESSMENT
Pt awake and alert, denies CP, SOB, HA, dizziness, or lightheadedness.  00:30 Pt remains asymptomatic

## 2024-01-08 NOTE — PHYSICAL THERAPY INITIAL EVALUATION ADULT - ADDITIONAL COMMENTS
Pt lives with his wife in a private house, +ramp access. Pt reports he is primarily wheelchair bound but is able to ambulate short distances with a RW. Pt has hospital bed. Pt has home health aide services (pt reports 10h/day, 7 days/week).

## 2024-01-08 NOTE — CARE COORDINATION ASSESSMENT. - NS SW [facility]OM[facility] DISCIPLINE
Pt currently has [facility]  for PT and VN for palomino care since he was D/C from [facility] in Nov. S/W aki Broussard who is requesting a change to [facility]/physical therapy/skilled nursing Pt currently has [facility]  for PT and VN for palomino care since he was D/C from [facility] in Nov. S/W aki Broussard who is requesting a change to [facility]/physical therapy/skilled nursing

## 2024-01-08 NOTE — PROGRESS NOTE ADULT - SUBJECTIVE AND OBJECTIVE BOX
[facility], Division of Infectious Diseases  JIAN Carrizales Y. Patel, S. Shah, G. [facility]  [phone]    Name: GURINDER TURNER  Age: 89y  Gender: Male  MRN: [id number]    Interval History:  Patient seen and examined at bedside this morning  No acute overnight events. Afebrile  No complaints  Notes reviewed    Antibiotics:  cefTRIAXone   IVPB 2000 milliGRAM(s) IV Intermittent every 24 hours      Medications:  acetaminophen     Tablet .. 650 milliGRAM(s) Oral every 6 hours PRN  aluminum hydroxide/magnesium hydroxide/simethicone Suspension 30 milliLiter(s) Oral every 4 hours PRN  cefTRIAXone   IVPB 2000 milliGRAM(s) IV Intermittent every 24 hours  finasteride 5 milliGRAM(s) Oral daily  melatonin 3 milliGRAM(s) Oral at bedtime PRN  ondansetron Injectable 4 milliGRAM(s) IV Push every 8 hours PRN  sodium chloride 0.9%. 1000 milliLiter(s) IV Continuous <Continuous>      Review of Systems:  A 10-point review of systems was obtained.   Review of systems otherwise negative except as previously noted.    Allergies: No Known Allergies    For details regarding the patient's past medical history, social history, family history, and other miscellaneous elements, please refer the initial infectious diseases consultation and/or the admitting history and physical examination for this admission.    Objective:  Vitals:   T(C): 36.5 (01-08-24 @ 11:33), Max: 37 (01-07-24 @ 19:52)  HR: 56 (01-08-24 @ 11:33) (56 - 70)  BP: 147/55 (01-08-24 @ 11:33) (147/55 - 171/58)  RR: 18 (01-08-24 @ 11:33) (16 - 18)  SpO2: 96% (01-08-24 @ 11:33) (90% - 98%)    Physical Examination:  General: no acute distress  HEENT: NC/AT, EOMI  Cardio: S1, S2 heard, RRR, no murmurs  Resp: decreased breath sounds  Abd: soft, NT, ND  Ext: no edema or cyanosis  Skin: warm, dry, no visible rash      Laboratory Studies:  CBC:                       8.9    7.90  )-----------( 257      ( 08 Jan 2024 06:45 )             27.4     CMP: 01-08    142  |  113<H>  |  28<H>  ----------------------------<  84  3.3<L>   |  23  |  0.59    Ca    7.8<L>      08 Jan 2024 06:45        Urinalysis Basic - ( 08 Jan 2024 06:45 )    Color: x / Appearance: x / SG: x / pH: x  Gluc: 84 mg/dL / Ketone: x  / Bili: x / Urobili: x   Blood: x / Protein: x / Nitrite: x   Leuk Esterase: x / RBC: x / WBC x   Sq Epi: x / Non Sq Epi: x / Bacteria: x        Microbiology: reviewed    Culture - Blood (collected 01-06-24 @ 17:00)  Source: .Blood Blood-Peripheral  Preliminary Report (01-08-24 @ 02:02):    No growth at 24 hours    Culture - Blood (collected 01-06-24 @ 16:45)  Source: .Blood Blood-Peripheral  Preliminary Report (01-08-24 @ 02:02):    No growth at 24 hours    Culture - Blood (collected 01-06-24 @ 05:54)  Source: .Blood Blood-Venous  Preliminary Report (01-07-24 @ 14:01):    No growth at 24 hours    Culture - Urine (collected 01-05-24 @ 19:40)  Source: Catheterized Catheterized  Final Report (01-07-24 @ 11:02):    >=3 organisms. Probable collection contamination.    Culture - Blood (collected 01-05-24 @ 19:20)  Source: .Blood Blood-Peripheral  Gram Stain (01-06-24 @ 12:41):    Growth in aerobic bottle: Gram Negative Rods  Final Report (01-08-24 @ 07:33):    Growth in aerobic bottle: Escherichia coli    Direct identification is available within approximately 3-5    hours either by Blood Panel Multiplexed PCR or Direct    MALDI-TOF. Details: [email or web address]  Organism: Blood Culture PCR  Escherichia coli (01-08-24 @ 07:33)  Organism: Escherichia coli (01-08-24 @ 07:33)      Method Type: JUANITO      -  Ampicillin: S <=8 These ampicillin results predict results for amoxicillin      -  Ampicillin/Sulbactam: S <=4/2      -  Aztreonam: S <=4      -  Cefazolin: S <=2      -  Cefepime: S <=2      -  Cefoxitin: S <=8      -  Ceftriaxone: S <=1      -  Ciprofloxacin: S <=0.25      -  Ertapenem: S <=0.5      -  Gentamicin: S <=2      -  Imipenem: S <=1      -  Levofloxacin: S <=0.5      -  Meropenem: S <=1      -  Piperacillin/Tazobactam: S <=8      -  Tobramycin: S <=2      -  Trimethoprim/Sulfamethoxazole: S <=0.5/9.5  Organism: Blood Culture PCR (01-08-24 @ 07:33)      Method Type: PCR      -  Escherichia coli: Detec          Radiology: reviewed       [facility], Division of Infectious Diseases  JIAN Carrizales Y. Patel, S. Shah, G. [facility]  [phone]    Name: GURINDER TURNER  Age: 89y  Gender: Male  MRN: [id number]    Interval History:  Patient seen and examined at bedside this morning  No acute overnight events. Afebrile  No complaints  Notes reviewed    Antibiotics:  cefTRIAXone   IVPB 2000 milliGRAM(s) IV Intermittent every 24 hours      Medications:  acetaminophen     Tablet .. 650 milliGRAM(s) Oral every 6 hours PRN  aluminum hydroxide/magnesium hydroxide/simethicone Suspension 30 milliLiter(s) Oral every 4 hours PRN  cefTRIAXone   IVPB 2000 milliGRAM(s) IV Intermittent every 24 hours  finasteride 5 milliGRAM(s) Oral daily  melatonin 3 milliGRAM(s) Oral at bedtime PRN  ondansetron Injectable 4 milliGRAM(s) IV Push every 8 hours PRN  sodium chloride 0.9%. 1000 milliLiter(s) IV Continuous <Continuous>      Review of Systems:  A 10-point review of systems was obtained.   Review of systems otherwise negative except as previously noted.    Allergies: No Known Allergies    For details regarding the patient's past medical history, social history, family history, and other miscellaneous elements, please refer the initial infectious diseases consultation and/or the admitting history and physical examination for this admission.    Objective:  Vitals:   T(C): 36.5 (01-08-24 @ 11:33), Max: 37 (01-07-24 @ 19:52)  HR: 56 (01-08-24 @ 11:33) (56 - 70)  BP: 147/55 (01-08-24 @ 11:33) (147/55 - 171/58)  RR: 18 (01-08-24 @ 11:33) (16 - 18)  SpO2: 96% (01-08-24 @ 11:33) (90% - 98%)    Physical Examination:  General: no acute distress  HEENT: NC/AT, EOMI  Cardio: S1, S2 heard, RRR, no murmurs  Resp: decreased breath sounds  Abd: soft, NT, ND  Ext: no edema or cyanosis  Skin: warm, dry, no visible rash      Laboratory Studies:  CBC:                       8.9    7.90  )-----------( 257      ( 08 Jan 2024 06:45 )             27.4     CMP: 01-08    142  |  113<H>  |  28<H>  ----------------------------<  84  3.3<L>   |  23  |  0.59    Ca    7.8<L>      08 Jan 2024 06:45        Urinalysis Basic - ( 08 Jan 2024 06:45 )    Color: x / Appearance: x / SG: x / pH: x  Gluc: 84 mg/dL / Ketone: x  / Bili: x / Urobili: x   Blood: x / Protein: x / Nitrite: x   Leuk Esterase: x / RBC: x / WBC x   Sq Epi: x / Non Sq Epi: x / Bacteria: x        Microbiology: reviewed    Culture - Blood (collected 01-06-24 @ 17:00)  Source: .Blood Blood-Peripheral  Preliminary Report (01-08-24 @ 02:02):    No growth at 24 hours    Culture - Blood (collected 01-06-24 @ 16:45)  Source: .Blood Blood-Peripheral  Preliminary Report (01-08-24 @ 02:02):    No growth at 24 hours    Culture - Blood (collected 01-06-24 @ 05:54)  Source: .Blood Blood-Venous  Preliminary Report (01-07-24 @ 14:01):    No growth at 24 hours    Culture - Urine (collected 01-05-24 @ 19:40)  Source: Catheterized Catheterized  Final Report (01-07-24 @ 11:02):    >=3 organisms. Probable collection contamination.    Culture - Blood (collected 01-05-24 @ 19:20)  Source: .Blood Blood-Peripheral  Gram Stain (01-06-24 @ 12:41):    Growth in aerobic bottle: Gram Negative Rods  Final Report (01-08-24 @ 07:33):    Growth in aerobic bottle: Escherichia coli    Direct identification is available within approximately 3-5    hours either by Blood Panel Multiplexed PCR or Direct    MALDI-TOF. Details: [email or web address]  Organism: Blood Culture PCR  Escherichia coli (01-08-24 @ 07:33)  Organism: Escherichia coli (01-08-24 @ 07:33)      Method Type: JUANITO      -  Ampicillin: S <=8 These ampicillin results predict results for amoxicillin      -  Ampicillin/Sulbactam: S <=4/2      -  Aztreonam: S <=4      -  Cefazolin: S <=2      -  Cefepime: S <=2      -  Cefoxitin: S <=8      -  Ceftriaxone: S <=1      -  Ciprofloxacin: S <=0.25      -  Ertapenem: S <=0.5      -  Gentamicin: S <=2      -  Imipenem: S <=1      -  Levofloxacin: S <=0.5      -  Meropenem: S <=1      -  Piperacillin/Tazobactam: S <=8      -  Tobramycin: S <=2      -  Trimethoprim/Sulfamethoxazole: S <=0.5/9.5  Organism: Blood Culture PCR (01-08-24 @ 07:33)      Method Type: PCR      -  Escherichia coli: Detec          Radiology: reviewed

## 2024-01-08 NOTE — CARE COORDINATION ASSESSMENT. - NSCAREPROVIDERS_GEN_ALL_CORE_FT
CARE PROVIDERS:  Accepting Physician: Salas Cook  Administration: Annabel Albright  Administration: Humberto Baptiste  Administration: Yariel Jennings  Admitting: Doctor, Sola  Attending: Gabriel Comer  Case Management: Ivan Lopez  Case Management: Ila Estrada  Consultant: Maryjane Saldivar  Consultant: Cyrus Espinoza  Consultant: Denise Sarabia  ED Attending: Humberto Pineda  ED Nurse: Humberto Luong  Emergency Medicine: Humberto Pineda  Nurse: Manasa Hines  Nurse: Justa Mendoza  Ordered: ADM, User  Outpatient Provider: Bin Kulkarni  Outpatient Provider: Gabriel Comer  Override: Nahomy Hugo  PCA/Nursing Assistant: Jaymie Marina  PCA/Nursing Assistant: Sammie Vincent  Primary Team: Marcela Chu  Registered Dietitian: Amarilys Willis  : Nancie Santana   CARE PROVIDERS:  Accepting Physician: Salas Cook  Administration: Annabel Albright  Administration: Humberto Baptiste  Administration: Yariel Jennings  Admitting: Doctor, Sola  Attending: Gabriel Comer  Case Management: Ivan Lopez  Case Management: Ila Estrada  Consultant: Maryjane Saldivar  Consultant: Cyrus Esipnoza  Consultant: Denise Sarabia  ED Attending: Humberto Pineda  ED Nurse: Humberto Luong  Emergency Medicine: Humberto Pineda  Nurse: Manasa Hines  Nurse: Justa Mendoza  Ordered: ADM, User  Outpatient Provider: Bin Kulkarni  Outpatient Provider: Gabriel Comer  Override: Nahomy Hugo  PCA/Nursing Assistant: Jaymie Marina  PCA/Nursing Assistant: Sammie Vincent  Primary Team: Marcela Chu  Registered Dietitian: Amarilys Willis  : Nancie Santana   CARE PROVIDERS:  Accepting Physician: Salas Cook  Administration: Annabel Albright  Administration: Humberto Baptiste  Administration: Yariel Jennings  Admitting: Doctor, Sola  Attending: Gabriel Comer  Case Management: Ivan Lopez  Case Management: Ila Estrada  Consultant: Maryjane Saldivar  Consultant: Cyrus Espinoza  Consultant: Denise Sarabia  ED Attending: Humberto Pineda  ED Nurse: Humberto Luong  Emergency Medicine: Humberto Pineda  Nurse: Manasa Hines  Nurse: Justa Mendoza  Ordered: ADM, User  Outpatient Provider: Bin Kulkarni  Outpatient Provider: Gabriel Comer  Override: Nahomy Hugo  PCA/Nursing Assistant: Sammie Vincent  Primary Team: Marcela Chu  Registered Dietitian: Akanksha Villegas  Registered Dietitian: Amarilys Willis  : Nancie Santana   CARE PROVIDERS:  Accepting Physician: Salas Cook  Administration: Annabel Albright  Administration: Humberto Baptiste  Administration: Yariel Jennings  Admitting: Doctor, Sola  Attending: Gabriel Comer  Case Management: Ivan Lopez  Case Management: Ila Estrada  Consultant: Maryjane Saldivar  Consultant: Cyrus Espinoza  Consultant: Denise Sarabia  ED Attending: Humberto Pineda  ED Nurse: Humberto Luong  Emergency Medicine: uHmberto Pineda  Nurse: Manasa Hines  Nurse: Justa Mendoza  Ordered: ADM, User  Outpatient Provider: Bin Kulkarni  Outpatient Provider: Gabriel Comer  Override: Nahomy Hugo  PCA/Nursing Assistant: Sammie Vincent  Primary Team: Marcela Chu  Registered Dietitian: Akanksha Villegas  Registered Dietitian: Amarilys Willis  : Nancie Santana

## 2024-01-08 NOTE — DIETITIAN INITIAL EVALUATION ADULT - LOSS OF SUBCUTANEOUS FAT
Dry Eye Syndrome Counseling: I have discussed the diagnosis and the pathophysiology of this disease with the patient. . Vision may be limited by dry eye, and symptoms exacerbated by environmental factors such as smoke, wind, or prolonged eye use. Treatment options include, but are not limited to, artificial tears, punctal plugs, topical and cyclosporine  I stressed the importance of compliance with treatment. There are no Wet Read(s) to document. Orbital.../Buccal...

## 2024-01-08 NOTE — CHART NOTE - NSCHARTNOTEFT_GEN_A_CORE
Called by RN for /70  - Pt with hx of HTN, multiple home HTN medications  - Asymptomatic, was sleeping when BP was taken; denies any pain or fever; other VS wnl   - Ordered for amlodipine 10mg PO x 1 (one of pt's home medications)  - RN to call with changes. Called by RN for /70  - Pt with hx of HTN, multiple home HTN medications  - Asymptomatic, was sleeping when BP was taken; denies any pain or fever; other VS wnl   - Ordered for amlodipine 10mg PO x 1 (one of pt's home medications)  - RN to call with changes.    ***ADDENDUM***  - Repeat BP was performed, continues to maintain at /70  - Will give hydralazine 10mg PO x 1 at this time  - RN to call with changes. Called by RN for /70  - Pt with hx of HTN, multiple home HTN medications  - Asymptomatic, was sleeping when BP was taken; denies any pain or fever; other VS wnl   - Ordered for amlodipine 10mg PO x 1 (one of pt's home medications)  - RN to call with changes.    ***ADDENDUM***  - Repeat BP was performed, continues to maintain at /70  - Will give hydralazine 10mg PO x 1 at this time  - RN to call with changes.      ***ADDENDUM***  - /60 manual this AM, HR 70s  - Given hydralazine 10mg IVP x 1 --> /60 on manual, w/HR maintaining 70s-80s   - Pt persistently hypertensive; per outpatient med rec, patient is on multiple BP medications at home  - L forearm IV also infiltrated --> nontender, non erythematous, heat packs placed, elevated, wrapped in ACE  - New IV placed on R forearm   - RN to call with any changes Called by RN for /70  - Pt with hx of HTN, multiple home HTN medications  - Asymptomatic, was sleeping when BP was taken; denies any pain or fever; other VS wnl   - Ordered for amlodipine 10mg PO x 1 (one of pt's home medications)  - RN to call with changes.    ***ADDENDUM***  - Repeat BP was performed, continues to maintain at /70  - Will give hydralazine 10mg PO x 1 at this time  - RN to call with changes.      ***ADDENDUM***  - /60 manual this AM, HR 70s  - Given hydralazine 10mg IVP x 1 --> /60 on manual, w/HR maintaining 70s-80s   - Pt now persistently hypertensive; per outpatient med rec, patient is on multiple BP medications at home  - Day team to re-evaluate home regimen and restart as appropriate   - L forearm IV also infiltrated --> nontender, non erythematous, heat packs placed, elevated, wrapped in ACE  - New IV placed on R forearm   - RN to call with any changes

## 2024-01-08 NOTE — SOCIAL WORK PROGRESS NOTE - NSSWPROGRESSNOTE_GEN_ALL_CORE
SW spoke with pts dtr- PT recommending [facility], dtr in [facility]. Pt was recently at [facility]- PRAKASH requested, pt will need auth. SW to follow.  SW spoke with pts dtr- PT recommending LASHAUN, dtr in [facility]. Pt was recently at [facility]- PRAKASH requested, pt will need auth. SW to follow.

## 2024-01-08 NOTE — DIETITIAN INITIAL EVALUATION ADULT - PERTINENT LABORATORY DATA
01-08    142  |  113<H>  |  28<H>  ----------------------------<  84  3.3<L>   |  23  |  0.59    Ca    7.8<L>      08 Jan 2024 06:45

## 2024-01-08 NOTE — CARE COORDINATION ASSESSMENT. - OTHER PERTINENT DISCHARGE PLANNING INFORMATION:
Met with patient at bedside and spoke with daughter Morena as patient requested @ [phone]. Role of CM/transition p;anning explained. Patient and daughter verbalize understanding. Transition information provided at this time. Daughter Morena states she wants HCS changed from [facility] to [facility] if possible as patient's dr's are with [facility] and she prefers to stay with all [facility]. Patient lives in private house w/ wife, ramp to enter.  Met with patient at bedside and spoke with daughter Morena as patient requested @ [phone]. Role of CM/transition p;anning explained. Patient and daughter verbalize understanding. Transition information provided at this time. Daughter Morena states she wants HCS changed from [facility] to [facility] if possible as patient's dr's are with [facility] and she prefers to stay with all [facility]. Patient lives in private house w/ wife, ramp to enter.  Met with patient at bedside and spoke with daughter Morena as patient requested @ [phone]. Role of CM/transition planning explained. Patient and daughter verbalize understanding. Transition information provided at this time. Daughter Morena states she wants HCS changed from [facility] to [facility] if possible as patient's dr's are with [facility] and she prefers to stay with all [facility]. Patient lives in private house w/ wife, ramp to enter. Patient has necessary DME=hospital bed, WC, RW, shower chair. As per Morena, pt was in [facility] for 20 days and D/C'd home w/ palomino in Nov with [facility]. Daughter is requesting HCS changed to [facility]. Referral sent to [facility] at this time. Patient has [facility] HHA's 12 hrs/day, 7 days/week from [facility]. [facility] CM is Gwendolyn Mars [phone]. I spoke with Gwendolyn who confirms 12hr [facility] and will need DC summary faxed to [phone]. Gwendolyn will need notice day prior to DC. CM contact info provided to daughter and CM Gwendolyn. CM will continue to follow.  Met with patient at bedside and spoke with daughter Morena as patient requested @ [phone]. Role of CM/transition planning explained. Patient and daughter verbalize understanding. Transition information provided at this time. Daughter Morena states she wants HCS changed from [facility] to [facility] if possible as patient's dr's are with [facility] and she prefers to stay with all [facility]. Patient lives in private house w/ wife, ramp to enter. Patient has necessary DME=hospital bed, WC, RW, shower chair. As per Morena, pt was in [facility] for 20 days and D/C'd home w/ palomino in Nov with [facility]. Daughter is requesting HCS changed to [facility]. Referral sent to [facility] at this time. Patient has [facility] HHA's 12 hrs/day, 7 days/week from [facility]. [facility] CM is Gwendolyn Mars [phone]. I spoke with Gwendolyn who confirms 12hr [facility] and will need DC summary faxed to [phone]. Gwendolyn will need notice day prior to DC. CM contact info provided to daughter and CM Gwendolyn. CM will continue to follow.

## 2024-01-08 NOTE — PATIENT CHOICE NOTE. - NSPTCHOICESTATE_GEN_ALL_CORE
I have met with the patient and/or caregiver to discuss discharge goals and treatment plan. Patient and/or caregiver also provided with instructions on accessing the CMS Compare websites for additional information related to Post Acute Provider quality and resource use measures to assist them in evaluation of the providers and in selecting their post-acute provider of choice. Patient and caregiver were informed of the facilities that are owned and/or operated by [facility]. I have discussed with the patient the availability of in-network facilities and providers. Patient and caregiver provided with a list of post-acute providers whose services are appropriate to the discharge plans and patient needs.     For patient requiring durable medical equipment, patient and/or caregiver were informed that they have the right to request who provides the required equipment. I have met with the patient and/or caregiver to discuss discharge goals and treatment plan. Patient and/or caregiver also provided with instructions on accessing the CMS Compare websites for additional information related to Post Acute Provider quality and resource use measures to assist them in evaluation of the providers and in selecting their post-acute provider of choice. Patient and caregiver were informed of the facilities that are owned and/or operated by [facility]. I have discussed with the patient the availability of in-network facilities and providers. Patient and caregiver provided with a list of post-acute providers whose services are appropriate to the discharge plans and patient needs.     For patient requiring durable medical equipment, patient and/or caregiver were informed that they have the right to request who provides the required equipment.

## 2024-01-08 NOTE — PHYSICAL THERAPY INITIAL EVALUATION ADULT - RANGE OF MOTION EXAMINATION, REHAB EVAL
bilateral upper extremity ROM was WFL (within functional limits)/bilateral lower extremity ROM was WFL (within functional limits) WITHIN 1 WEEK

## 2024-01-08 NOTE — CARE COORDINATION ASSESSMENT. - NSDCPLANSERVICES_GEN_ALL_CORE
pt has [facility] PTA and requesting change to NW/Anticipated Needs Unclear at Present/Home Care pt has [facility] PTA and requesting change to NW/Anticipated Needs Unclear at Present/Home Care

## 2024-01-08 NOTE — PROGRESS NOTE ADULT - ASSESSMENT
89m with AF on coumadin, HTN, BPH with chronic palomino, p/w urinary retention, sepsis due to gram negative bacteremia and uti    urinary retention/genital trauma  palomino replaced  urology following    sepsis/uti  ecoli bacteremia  on abx  ID following  midline placement for iv abx thru 1/15    supratherapeutic INR  hold coumadin for now  daily INRs    af  coumadin on hold The patient underwent a RIGHT ANTERIOR TOTAL HIP REPLACEMENT on 3/12/21. The patient received antibiotics consistent with SCIP guidelines. The patient underwent the procedure and had no intra-operative complications. Post-operatively, the patient was seen by medicine and PT. The patient received ASPIRIN for DVTP. The patient received pain medications per orthopedic pain management pathway and the pain was appropriately controlled. Patient was instructed on anterior total hip precautions by PT. The patient did not have any post-operative medical complications. The patient was discharged in stable condition.

## 2024-01-08 NOTE — PHYSICAL THERAPY INITIAL EVALUATION ADULT - PERTINENT HX OF CURRENT PROBLEM, REHAB EVAL
Per H&P Pt is a 89 y/o M with PMH of Afib on Coumadin,  HTN, macular degeneration, BPH c/b urinary retention presented to ED to be evaluated for urinary retention for 36 hours. ED staff tried to  place in palomino but could not pass throgh. urology consulted who ended up placing 16 fr coude in ED with 800 cc u/o. pt labs indicative of UTI with WBC of 28.23 K . per 2 sons at bedside pt also been pleasantly confused for past few days,

## 2024-01-08 NOTE — PHYSICAL THERAPY INITIAL EVALUATION ADULT - GAIT TRAINING, PT EVAL
Patient will ambulate 10 feet with minAx1 with rolling walker by 2 weeks to allow for increased independence in the community.

## 2024-01-08 NOTE — CAREGIVER ENGAGEMENT NOTE - CAREGIVER OUTREACH NOTES - FREE TEXT
Spoke with daughter Morena after patient requested. Morena requesting [facility] to be changed from [facility] to [facility].  Spoke with daughter Morena after patient requested. Morena requesting [facility] to be changed from [facility] to [facility].

## 2024-01-08 NOTE — PROVIDER CONTACT NOTE (OTHER) - ACTION/TREATMENT ORDERED:
amlodipine 10mg po x 1  00:39 No orders received at this time amlodipine 10mg po x 1  00:39 No orders received at this time  01:46 Hydralazine 10mg po x 1

## 2024-01-08 NOTE — DIETITIAN INITIAL EVALUATION ADULT - PERTINENT MEDS FT
MEDICATIONS  (STANDING):  cefTRIAXone   IVPB 2000 milliGRAM(s) IV Intermittent every 24 hours  finasteride 5 milliGRAM(s) Oral daily  sodium chloride 0.9%. 1000 milliLiter(s) (84 mL/Hr) IV Continuous <Continuous>    MEDICATIONS  (PRN):  acetaminophen     Tablet .. 650 milliGRAM(s) Oral every 6 hours PRN Temp greater or equal to 38C (100.4F), Mild Pain (1 - 3)  aluminum hydroxide/magnesium hydroxide/simethicone Suspension 30 milliLiter(s) Oral every 4 hours PRN Dyspepsia  melatonin 3 milliGRAM(s) Oral at bedtime PRN Insomnia  ondansetron Injectable 4 milliGRAM(s) IV Push every 8 hours PRN Nausea and/or Vomiting

## 2024-01-08 NOTE — CASE MANAGEMENT PROGRESS NOTE - NSCMPROGRESSNOTE_GEN_ALL_CORE
CM consult for stairs at home, Health Literacy Reading, Health Literacy Understanding Doctor noted and reviewed. CM to remain available and monitor for home care needs

## 2024-01-08 NOTE — DIETITIAN INITIAL EVALUATION ADULT - ORAL INTAKE PTA/DIET HISTORY
patient seen in bed with son in room . no food allergies. poor dentition but denies difficulties with breakfast tolerated meal. discussed will provide cut up meats. patient was eating well PTA 3 meals per day. avoids added salt.   education deferred in this patient.  food preferences noted. willing to try ensure plus HP

## 2024-01-08 NOTE — DIETITIAN INITIAL EVALUATION ADULT - ADD RECOMMEND
1. recommend MVI and Vit C 500mg BID 2. recommend ensure plus HP BID left message with MD to activate diet to include supplement

## 2024-01-08 NOTE — PROGRESS NOTE ADULT - ASSESSMENT
89 y/o M with PMH of Afib on Coumadin,  HTN, macular degeneration, BPH c/b urinary retention presented to ED to be evaluated for urinary retention as palomino was not draining    E Coli bacteremia  blood cx from 1/5 PCR positive for E Coli susceptibilities noted  1/6 BCx NGTD  urine culture contaminated but still suspect 2/2  source 2/2 urinary retention  catheter placed by urology on admission    Recommendations  c/w ceftriaxone 2g daily  can plan for midline and x10 day course until 1/15/24  trend temps/WBC  additional care per primary team    Infectious Diseases will continue to follow. Please call with any questions.   Denise Sarabia M.D.  [facility] Division of Infectious Diseases [phone]  For after 5 P.M. and weekends, please call [phone]       89 y/o M with PMH of Afib on Coumadin,  HTN, macular degeneration, BPH c/b urinary retention presented to ED to be evaluated for urinary retention as palomino was not draining    E Coli bacteremia  blood cx from 1/5 PCR positive for E Coli susceptibilities noted  1/6 BCx NGTD  urine culture contaminated but still suspect 2/2  source 2/2 urinary retention  catheter placed by urology on admission    Recommendations  c/w ceftriaxone 2g daily  can plan for midline and x10 day course until 1/15/24  trend temps/WBC  additional care per primary team    Infectious Diseases will continue to follow. Please call with any questions.   Denise Sarabia M.D.  [facility] Division of Infectious Diseases [phone]  For after 5 P.M. and weekends, please call [phone]       89 y/o M with PMH of Afib on Coumadin,  HTN, macular degeneration, BPH c/b urinary retention presented to ED to be evaluated for urinary retention as palomino was not draining    E Coli bacteremia  blood cx from 1/5 PCR positive for E Coli susceptibilities noted  1/6 BCx NGTD  urine culture contaminated but still suspect 2/2  source 2/2 urinary retention  catheter placed by urology on admission    Recommendations  c/w ceftriaxone 2g daily  can plan for midline and x10 day course until 1/15/24  trend temps/WBC  additional care per primary team    D/w Dr. Comer    Infectious Diseases will continue to follow. Please call with any questions.   Denise Sarabia M.D.  [facility] Division of Infectious Diseases [phone]  For after 5 P.M. and weekends, please call [phone]       89 y/o M with PMH of Afib on Coumadin,  HTN, macular degeneration, BPH c/b urinary retention presented to ED to be evaluated for urinary retention as palomino was not draining    E Coli bacteremia  blood cx from 1/5 PCR positive for E Coli susceptibilities noted  1/6 BCx NGTD  urine culture contaminated but still suspect 2/2  source 2/2 urinary retention  catheter placed by urology on admission    Recommendations  c/w ceftriaxone 2g daily  can plan for midline and x10 day course until 1/15/24  trend temps/WBC  additional care per primary team    D/w Dr. Comer    Infectious Diseases will continue to follow. Please call with any questions.   Denise Sarabia M.D.  [facility] Division of Infectious Diseases [phone]  For after 5 P.M. and weekends, please call [phone]

## 2024-01-08 NOTE — PROGRESS NOTE ADULT - SUBJECTIVE AND OBJECTIVE BOX
Patient is a 89y old  Male who presents with a chief complaint of Urinary tract infection     (08 Jan 2024 12:25)        INTERVAL HPI/OVERNIGHT EVENTS:   no complaints  pt seen and examined         Vital Signs Last 24 Hrs  T(C): 36.5 (08 Jan 2024 11:33), Max: 37 (07 Jan 2024 19:52)  T(F): 97.7 (08 Jan 2024 11:33), Max: 98.6 (07 Jan 2024 19:52)  HR: 56 (08 Jan 2024 11:33) (56 - 70)  BP: 147/55 (08 Jan 2024 11:33) (147/55 - 171/58)  BP(mean): --  RR: 18 (08 Jan 2024 11:33) (16 - 18)  SpO2: 96% (08 Jan 2024 11:33) (90% - 98%)    Parameters below as of 08 Jan 2024 11:33  Patient On (Oxygen Delivery Method): room air        acetaminophen     Tablet .. 650 milliGRAM(s) Oral every 6 hours PRN  aluminum hydroxide/magnesium hydroxide/simethicone Suspension 30 milliLiter(s) Oral every 4 hours PRN  cefTRIAXone   IVPB 2000 milliGRAM(s) IV Intermittent every 24 hours  finasteride 5 milliGRAM(s) Oral daily  melatonin 3 milliGRAM(s) Oral at bedtime PRN  ondansetron Injectable 4 milliGRAM(s) IV Push every 8 hours PRN  potassium chloride    Tablet ER 40 milliEquivalent(s) Oral once  warfarin 3 milliGRAM(s) Oral once      PHYSICAL EXAM:  GENERAL: NAD   EYES: conjunctiva and sclera clear  ENMT: Moist mucous membranes  NECK: Supple, No JVD, Normal thyroid  CHEST/LUNG: non labored, cta b/l  HEART: Regular rate and rhythm; No murmurs, rubs, or gallops  ABDOMEN: Soft, Nontender, Nondistended; Bowel sounds present  EXTREMITIES:  2+ Peripheral Pulses, No clubbing, no cyanosis, no edema  LYMPH: No lymphadenopathy noted  SKIN: No rashes or lesions  NEURO: no focal deficits    Consultant(s) Notes Reviewed:  [x ] YES  [ ] NO  Care Discussed with Consultants/Other Providers [ x] YES  [ ] NO    LABS:                        8.9    7.90  )-----------( 257      ( 08 Jan 2024 06:45 )             27.4     01-08    142  |  113<H>  |  28<H>  ----------------------------<  84  3.3<L>   |  23  |  0.59    Ca    7.8<L>      08 Jan 2024 06:45      PT/INR - ( 08 Jan 2024 06:45 )   PT: 18.0 sec;   INR: 1.56 ratio           Urinalysis Basic - ( 08 Jan 2024 06:45 )    Color: x / Appearance: x / SG: x / pH: x  Gluc: 84 mg/dL / Ketone: x  / Bili: x / Urobili: x   Blood: x / Protein: x / Nitrite: x   Leuk Esterase: x / RBC: x / WBC x   Sq Epi: x / Non Sq Epi: x / Bacteria: x      CAPILLARY BLOOD GLUCOSE            Urinalysis Basic - ( 08 Jan 2024 06:45 )    Color: x / Appearance: x / SG: x / pH: x  Gluc: 84 mg/dL / Ketone: x  / Bili: x / Urobili: x   Blood: x / Protein: x / Nitrite: x   Leuk Esterase: x / RBC: x / WBC x   Sq Epi: x / Non Sq Epi: x / Bacteria: x        Culture - Blood (collected 06 Jan 2024 17:00)  Source: .Blood Blood-Peripheral  Preliminary Report (08 Jan 2024 02:02):    No growth at 24 hours    Culture - Blood (collected 06 Jan 2024 16:45)  Source: .Blood Blood-Peripheral  Preliminary Report (08 Jan 2024 02:02):    No growth at 24 hours    Culture - Blood (collected 06 Jan 2024 05:54)  Source: .Blood Blood-Venous  Preliminary Report (08 Jan 2024 14:02):    No growth at 48 Hours    Culture - Urine (collected 05 Jan 2024 19:40)  Source: Catheterized Catheterized  Final Report (07 Jan 2024 11:02):    >=3 organisms. Probable collection contamination.    Culture - Blood (collected 05 Jan 2024 19:20)  Source: .Blood Blood-Peripheral  Gram Stain (06 Jan 2024 12:41):    Growth in aerobic bottle: Gram Negative Rods  Final Report (08 Jan 2024 07:33):    Growth in aerobic bottle: Escherichia coli    Direct identification is available within approximately 3-5    hours either by Blood Panel Multiplexed PCR or Direct    MALDI-TOF. Details: [email or web address]  Organism: Blood Culture PCR  Escherichia coli (08 Jan 2024 07:33)  Organism: Escherichia coli (08 Jan 2024 07:33)  Organism: Blood Culture PCR (08 Jan 2024 07:33)        RADIOLOGY & ADDITIONAL TESTS:    Imaging Personally Reviewed  Reviewed consultants input Patient is a 89y old  Male who presents with a chief complaint of Urinary tract infection     (08 Jan 2024 12:25)        INTERVAL HPI/OVERNIGHT EVENTS:   no complaints  pt seen and examined         Vital Signs Last 24 Hrs  T(C): 36.5 (08 Jan 2024 11:33), Max: 37 (07 Jan 2024 19:52)  T(F): 97.7 (08 Jan 2024 11:33), Max: 98.6 (07 Jan 2024 19:52)  HR: 56 (08 Jan 2024 11:33) (56 - 70)  BP: 147/55 (08 Jan 2024 11:33) (147/55 - 171/58)  BP(mean): --  RR: 18 (08 Jan 2024 11:33) (16 - 18)  SpO2: 96% (08 Jan 2024 11:33) (90% - 98%)    Parameters below as of 08 Jan 2024 11:33  Patient On (Oxygen Delivery Method): room air        acetaminophen     Tablet .. 650 milliGRAM(s) Oral every 6 hours PRN  aluminum hydroxide/magnesium hydroxide/simethicone Suspension 30 milliLiter(s) Oral every 4 hours PRN  cefTRIAXone   IVPB 2000 milliGRAM(s) IV Intermittent every 24 hours  finasteride 5 milliGRAM(s) Oral daily  melatonin 3 milliGRAM(s) Oral at bedtime PRN  ondansetron Injectable 4 milliGRAM(s) IV Push every 8 hours PRN  potassium chloride    Tablet ER 40 milliEquivalent(s) Oral once  warfarin 3 milliGRAM(s) Oral once      PHYSICAL EXAM:  GENERAL: NAD   EYES: conjunctiva and sclera clear  ENMT: Moist mucous membranes  NECK: Supple, No JVD, Normal thyroid  CHEST/LUNG: non labored, cta b/l  HEART: Regular rate and rhythm; No murmurs, rubs, or gallops  ABDOMEN: Soft, Nontender, Nondistended; Bowel sounds present  EXTREMITIES:  2+ Peripheral Pulses, No clubbing, no cyanosis, no edema  LYMPH: No lymphadenopathy noted  SKIN: No rashes or lesions  NEURO: no focal deficits    Consultant(s) Notes Reviewed:  [x ] YES  [ ] NO  Care Discussed with Consultants/Other Providers [ x] YES  [ ] NO    LABS:                        8.9    7.90  )-----------( 257      ( 08 Jan 2024 06:45 )             27.4     01-08    142  |  113<H>  |  28<H>  ----------------------------<  84  3.3<L>   |  23  |  0.59    Ca    7.8<L>      08 Jan 2024 06:45      PT/INR - ( 08 Jan 2024 06:45 )   PT: 18.0 sec;   INR: 1.56 ratio           Urinalysis Basic - ( 08 Jan 2024 06:45 )    Color: x / Appearance: x / SG: x / pH: x  Gluc: 84 mg/dL / Ketone: x  / Bili: x / Urobili: x   Blood: x / Protein: x / Nitrite: x   Leuk Esterase: x / RBC: x / WBC x   Sq Epi: x / Non Sq Epi: x / Bacteria: x      CAPILLARY BLOOD GLUCOSE            Urinalysis Basic - ( 08 Jan 2024 06:45 )    Color: x / Appearance: x / SG: x / pH: x  Gluc: 84 mg/dL / Ketone: x  / Bili: x / Urobili: x   Blood: x / Protein: x / Nitrite: x   Leuk Esterase: x / RBC: x / WBC x   Sq Epi: x / Non Sq Epi: x / Bacteria: x        Culture - Blood (collected 06 Jan 2024 17:00)  Source: .Blood Blood-Peripheral  Preliminary Report (08 Jan 2024 02:02):    No growth at 24 hours    Culture - Blood (collected 06 Jan 2024 16:45)  Source: .Blood Blood-Peripheral  Preliminary Report (08 Jan 2024 02:02):    No growth at 24 hours    Culture - Blood (collected 06 Jan 2024 05:54)  Source: .Blood Blood-Venous  Preliminary Report (08 Jan 2024 14:02):    No growth at 48 Hours    Culture - Urine (collected 05 Jan 2024 19:40)  Source: Catheterized Catheterized  Final Report (07 Jan 2024 11:02):    >=3 organisms. Probable collection contamination.    Culture - Blood (collected 05 Jan 2024 19:20)  Source: .Blood Blood-Peripheral  Gram Stain (06 Jan 2024 12:41):    Growth in aerobic bottle: Gram Negative Rods  Final Report (08 Jan 2024 07:33):    Growth in aerobic bottle: Escherichia coli    Direct identification is available within approximately 3-5    hours either by Blood Panel Multiplexed PCR or Direct    MALDI-TOF. Details: [email or web address]  Organism: Blood Culture PCR  Escherichia coli (08 Jan 2024 07:33)  Organism: Escherichia coli (08 Jan 2024 07:33)  Organism: Blood Culture PCR (08 Jan 2024 07:33)        RADIOLOGY & ADDITIONAL TESTS:    Imaging Personally Reviewed  Reviewed consultants input

## 2024-01-08 NOTE — PHYSICAL THERAPY INITIAL EVALUATION ADULT - TRANSFER TRAINING, PT EVAL
Patient will perform sit<->stand transfer with minAx1 with rolling walker by 2 weeks to allow patient to get on/off toilet safely.

## 2024-01-08 NOTE — PHYSICAL THERAPY INITIAL EVALUATION ADULT - LEVEL OF INDEPENDENCE: GAIT, REHAB EVAL
Pt to rr with uneven but steady gait. UA collected and sent to lab.   moderate assist (50% patients effort)

## 2024-01-08 NOTE — DIETITIAN INITIAL EVALUATION ADULT - OTHER INFO
Reason for Admission: urinary retention  History of Present Illness:   89 y/o M with PMH of Afib on Coumadin,  HTN, macular degeneration, BPH c/b urinary retention presented to ED to be evaluated for urinary retention for 36 hours. ED staff tried to  place in palomino but could not pass throgh. urology consulted who ended up placing 16 fr coude in ED with 800 cc u/o. pt labs indicative of UTI with WBC of 28.23 K . per 2 sons at bedside pt also been pleasantly confused for past few days  weight this admit 140# October admit 130#   Ht 5'10" Reason for Admission: urinary retention  History of Present Illness:   89 y/o M with PMH of Afib on Coumadin,  HTN, macular degeneration, BPH c/b urinary retention presented to ED to be evaluated for urinary retention for 36 hours. ED staff tried to  place in palomino but could not pass throgh. urology consulted who ended up placing 16 fr coude in ED with 800 cc u/o. pt labs indicative of UTI with WBC of 28.23 K . per 2 sons at bedside pt also been pleasantly confused for past few days  weight this admit 140# October admit 130#   Ht 5'10"

## 2024-01-08 NOTE — PROGRESS NOTE ADULT - ASSESSMENT
Urinary retention  UTI impoving.  Continue Abx per ID  Chronic indwelling palomino to be changed monthly    reconsult prn.

## 2024-01-08 NOTE — PATIENT CHOICE NOTE. - NSPTCHOICENOTES_GEN_ALL_CORE
Transition information and choice lists provided. Daughter requesting change of [facility] from [facility] to [facility] Transition information and choice lists provided. Daughter requesting change of [facility] from [facility] to [facility]

## 2024-01-08 NOTE — PHYSICAL THERAPY INITIAL EVALUATION ADULT - PATIENT PROFILE REVIEW, REHAB EVAL
PT orders received: ambulate with assistance. Consult with RN Justa, pt may participate in PT evaluation./yes

## 2024-01-08 NOTE — PROGRESS NOTE ADULT - SUBJECTIVE AND OBJECTIVE BOX
INTERVAL Hx:  Pt sleeping soundly.  No acute events.  Resolved leukocytosis.  Repeat blood cultures negative.  Craig draining clear, yellow urine.    MEDICATIONS  (STANDING):  cefTRIAXone   IVPB 2000 milliGRAM(s) IV Intermittent every 24 hours  finasteride 5 milliGRAM(s) Oral daily  sodium chloride 0.9%. 1000 milliLiter(s) (84 mL/Hr) IV Continuous <Continuous>    MEDICATIONS  (PRN):  acetaminophen     Tablet .. 650 milliGRAM(s) Oral every 6 hours PRN Temp greater or equal to 38C (100.4F), Mild Pain (1 - 3)  aluminum hydroxide/magnesium hydroxide/simethicone Suspension 30 milliLiter(s) Oral every 4 hours PRN Dyspepsia  melatonin 3 milliGRAM(s) Oral at bedtime PRN Insomnia  ondansetron Injectable 4 milliGRAM(s) IV Push every 8 hours PRN Nausea and/or Vomiting        Vital Signs Last 24 Hrs  T(C): 36.7 (08 Jan 2024 05:21), Max: 37 (07 Jan 2024 19:52)  T(F): 98 (08 Jan 2024 05:21), Max: 98.6 (07 Jan 2024 19:52)  HR: 65 (08 Jan 2024 05:21) (63 - 70)  BP: 163/60 (08 Jan 2024 05:21) (152/66 - 171/58)  BP(mean): --  RR: 17 (08 Jan 2024 05:21) (16 - 18)  SpO2: 98% (08 Jan 2024 05:21) (90% - 98%)    Parameters below as of 08 Jan 2024 05:21  Patient On (Oxygen Delivery Method): room air      LABS:                        8.9    7.90  )-----------( 257      ( 08 Jan 2024 06:45 )             27.4     01-08    142  |  113<H>  |  28<H>  ----------------------------<  84  3.3<L>   |  23  |  0.59    Ca    7.8<L>      08 Jan 2024 06:45      Urine culture:  01-06 @ 17:00 --   No growth at 24 hours  Urine culture:  01-06 @ 16:45 --   No growth at 24 hours  Urine culture:  01-06 @ 05:54 --   No growth at 24 hours

## 2024-01-08 NOTE — DIETITIAN INITIAL EVALUATION ADULT - NSFNSPHYEXAMSKINFT_GEN_A_CORE
Pressure Injury 1: sacrum, Suspected deep tissue injury  Pressure Injury 2: Right:, heel, Stage I  Pressure Injury 3: Left:, heel, Stage I

## 2024-01-09 LAB
ANION GAP SERPL CALC-SCNC: 5 MMOL/L — SIGNIFICANT CHANGE UP (ref 5–17)
ANION GAP SERPL CALC-SCNC: 5 MMOL/L — SIGNIFICANT CHANGE UP (ref 5–17)
BUN SERPL-MCNC: 26 MG/DL — HIGH (ref 7–23)
BUN SERPL-MCNC: 26 MG/DL — HIGH (ref 7–23)
CALCIUM SERPL-MCNC: 7.8 MG/DL — LOW (ref 8.5–10.1)
CALCIUM SERPL-MCNC: 7.8 MG/DL — LOW (ref 8.5–10.1)
CHLORIDE SERPL-SCNC: 111 MMOL/L — HIGH (ref 96–108)
CHLORIDE SERPL-SCNC: 111 MMOL/L — HIGH (ref 96–108)
CO2 SERPL-SCNC: 24 MMOL/L — SIGNIFICANT CHANGE UP (ref 22–31)
CO2 SERPL-SCNC: 24 MMOL/L — SIGNIFICANT CHANGE UP (ref 22–31)
CREAT SERPL-MCNC: 0.5 MG/DL — SIGNIFICANT CHANGE UP (ref 0.5–1.3)
CREAT SERPL-MCNC: 0.5 MG/DL — SIGNIFICANT CHANGE UP (ref 0.5–1.3)
EGFR: 95 ML/MIN/1.73M2 — SIGNIFICANT CHANGE UP
EGFR: 95 ML/MIN/1.73M2 — SIGNIFICANT CHANGE UP
GLUCOSE SERPL-MCNC: 90 MG/DL — SIGNIFICANT CHANGE UP (ref 70–99)
GLUCOSE SERPL-MCNC: 90 MG/DL — SIGNIFICANT CHANGE UP (ref 70–99)
HCT VFR BLD CALC: 31.5 % — LOW (ref 39–50)
HCT VFR BLD CALC: 31.5 % — LOW (ref 39–50)
HGB BLD-MCNC: 10.4 G/DL — LOW (ref 13–17)
HGB BLD-MCNC: 10.4 G/DL — LOW (ref 13–17)
INR BLD: 1.53 RATIO — HIGH (ref 0.85–1.18)
INR BLD: 1.53 RATIO — HIGH (ref 0.85–1.18)
MCHC RBC-ENTMCNC: 31.5 PG — SIGNIFICANT CHANGE UP (ref 27–34)
MCHC RBC-ENTMCNC: 31.5 PG — SIGNIFICANT CHANGE UP (ref 27–34)
MCHC RBC-ENTMCNC: 33 GM/DL — SIGNIFICANT CHANGE UP (ref 32–36)
MCHC RBC-ENTMCNC: 33 GM/DL — SIGNIFICANT CHANGE UP (ref 32–36)
MCV RBC AUTO: 95.5 FL — SIGNIFICANT CHANGE UP (ref 80–100)
MCV RBC AUTO: 95.5 FL — SIGNIFICANT CHANGE UP (ref 80–100)
NRBC # BLD: 0 /100 WBCS — SIGNIFICANT CHANGE UP (ref 0–0)
NRBC # BLD: 0 /100 WBCS — SIGNIFICANT CHANGE UP (ref 0–0)
PLATELET # BLD AUTO: 321 K/UL — SIGNIFICANT CHANGE UP (ref 150–400)
PLATELET # BLD AUTO: 321 K/UL — SIGNIFICANT CHANGE UP (ref 150–400)
POTASSIUM SERPL-MCNC: 3.7 MMOL/L — SIGNIFICANT CHANGE UP (ref 3.5–5.3)
POTASSIUM SERPL-MCNC: 3.7 MMOL/L — SIGNIFICANT CHANGE UP (ref 3.5–5.3)
POTASSIUM SERPL-SCNC: 3.7 MMOL/L — SIGNIFICANT CHANGE UP (ref 3.5–5.3)
POTASSIUM SERPL-SCNC: 3.7 MMOL/L — SIGNIFICANT CHANGE UP (ref 3.5–5.3)
PROTHROM AB SERPL-ACNC: 17.7 SEC — HIGH (ref 9.5–13)
PROTHROM AB SERPL-ACNC: 17.7 SEC — HIGH (ref 9.5–13)
RBC # BLD: 3.3 M/UL — LOW (ref 4.2–5.8)
RBC # BLD: 3.3 M/UL — LOW (ref 4.2–5.8)
RBC # FLD: 15.6 % — HIGH (ref 10.3–14.5)
RBC # FLD: 15.6 % — HIGH (ref 10.3–14.5)
SODIUM SERPL-SCNC: 140 MMOL/L — SIGNIFICANT CHANGE UP (ref 135–145)
SODIUM SERPL-SCNC: 140 MMOL/L — SIGNIFICANT CHANGE UP (ref 135–145)
WBC # BLD: 8.54 K/UL — SIGNIFICANT CHANGE UP (ref 3.8–10.5)
WBC # BLD: 8.54 K/UL — SIGNIFICANT CHANGE UP (ref 3.8–10.5)
WBC # FLD AUTO: 8.54 K/UL — SIGNIFICANT CHANGE UP (ref 3.8–10.5)
WBC # FLD AUTO: 8.54 K/UL — SIGNIFICANT CHANGE UP (ref 3.8–10.5)

## 2024-01-09 RX ORDER — HYDRALAZINE HCL 50 MG
10 TABLET ORAL ONCE
Refills: 0 | Status: COMPLETED | OUTPATIENT
Start: 2024-01-09 | End: 2024-01-09

## 2024-01-09 RX ORDER — AMLODIPINE BESYLATE 2.5 MG/1
10 TABLET ORAL DAILY
Refills: 0 | Status: DISCONTINUED | OUTPATIENT
Start: 2024-01-09 | End: 2024-01-13

## 2024-01-09 RX ORDER — LOSARTAN POTASSIUM 100 MG/1
100 TABLET, FILM COATED ORAL DAILY
Refills: 0 | Status: DISCONTINUED | OUTPATIENT
Start: 2024-01-09 | End: 2024-01-13

## 2024-01-09 RX ORDER — WARFARIN SODIUM 2.5 MG/1
3 TABLET ORAL ONCE
Refills: 0 | Status: COMPLETED | OUTPATIENT
Start: 2024-01-09 | End: 2024-01-09

## 2024-01-09 RX ORDER — HYDRALAZINE HCL 50 MG
5 TABLET ORAL ONCE
Refills: 0 | Status: DISCONTINUED | OUTPATIENT
Start: 2024-01-09 | End: 2024-01-09

## 2024-01-09 RX ADMIN — Medication 10 MILLIGRAM(S): at 01:58

## 2024-01-09 RX ADMIN — CEFTRIAXONE 100 MILLIGRAM(S): 500 INJECTION, POWDER, FOR SOLUTION INTRAMUSCULAR; INTRAVENOUS at 05:12

## 2024-01-09 RX ADMIN — LOSARTAN POTASSIUM 100 MILLIGRAM(S): 100 TABLET, FILM COATED ORAL at 11:49

## 2024-01-09 RX ADMIN — Medication 10 MILLIGRAM(S): at 06:15

## 2024-01-09 RX ADMIN — WARFARIN SODIUM 3 MILLIGRAM(S): 2.5 TABLET ORAL at 21:07

## 2024-01-09 RX ADMIN — FINASTERIDE 5 MILLIGRAM(S): 5 TABLET, FILM COATED ORAL at 11:49

## 2024-01-09 RX ADMIN — AMLODIPINE BESYLATE 10 MILLIGRAM(S): 2.5 TABLET ORAL at 11:49

## 2024-01-09 NOTE — PROGRESS NOTE ADULT - ASSESSMENT
89m with AF on coumadin, HTN, BPH with chronic palomino, p/w urinary retention, sepsis due to gram negative bacteremia and uti    urinary retention/genital trauma  palomino replaced  urology following    sepsis/uti  ecoli bacteremia  on abx  ID following    supratherapeutic INR  resolved  daily INRs    af  continue coumadin

## 2024-01-09 NOTE — PROGRESS NOTE ADULT - SUBJECTIVE AND OBJECTIVE BOX
[facility], Division of Infectious Diseases  JIAN Carrizales Y. Patel, S. Shah, G. [facility]  [phone]    Name: GURINDER TURNER  Age: 89y  Gender: Male  MRN: [id number]    Interval History:  Patient seen and examined at bedside   No acute overnight events. Afebrile  No complaints  Notes reviewed    Antibiotics:  cefTRIAXone   IVPB 2000 milliGRAM(s) IV Intermittent every 24 hours      Medications:  acetaminophen     Tablet .. 650 milliGRAM(s) Oral every 6 hours PRN  aluminum hydroxide/magnesium hydroxide/simethicone Suspension 30 milliLiter(s) Oral every 4 hours PRN  amLODIPine   Tablet 10 milliGRAM(s) Oral daily  cefTRIAXone   IVPB 2000 milliGRAM(s) IV Intermittent every 24 hours  finasteride 5 milliGRAM(s) Oral daily  losartan 100 milliGRAM(s) Oral daily  melatonin 3 milliGRAM(s) Oral at bedtime PRN  ondansetron Injectable 4 milliGRAM(s) IV Push every 8 hours PRN      Review of Systems:  unable to obtain    Allergies: No Known Allergies    For details regarding the patient's past medical history, social history, family history, and other miscellaneous elements, please refer the initial infectious diseases consultation and/or the admitting history and physical examination for this admission.    Objective:  Vitals:   T(C): 36.8 (01-09-24 @ 12:47), Max: 36.8 (01-09-24 @ 12:47)  HR: 72 (01-09-24 @ 12:47) (62 - 80)  BP: 164/60 (01-09-24 @ 12:47) (162/62 - 200/60)  RR: 18 (01-09-24 @ 12:47) (18 - 18)  SpO2: 95% (01-09-24 @ 12:47) (93% - 99%)    Physical Examination:  General: no acute distress  HEENT: NC/AT, EOMI  Cardio: RRR  Resp: decreased breath sounds  Abd: soft, NT, ND,  Ext: no edema or cyanosis  Skin: warm, dry, no visible rash      Laboratory Studies:  CBC:                       10.4   8.54  )-----------( 321      ( 09 Jan 2024 06:10 )             31.5     CMP: 01-09    140  |  111<H>  |  26<H>  ----------------------------<  90  3.7   |  24  |  0.50    Ca    7.8<L>      09 Jan 2024 06:10        Urinalysis Basic - ( 09 Jan 2024 06:10 )    Color: x / Appearance: x / SG: x / pH: x  Gluc: 90 mg/dL / Ketone: x  / Bili: x / Urobili: x   Blood: x / Protein: x / Nitrite: x   Leuk Esterase: x / RBC: x / WBC x   Sq Epi: x / Non Sq Epi: x / Bacteria: x        Microbiology: reviewed    Culture - Blood (collected 01-06-24 @ 17:00)  Source: .Blood Blood-Peripheral  Preliminary Report (01-09-24 @ 02:02):    No growth at 48 Hours    Culture - Blood (collected 01-06-24 @ 16:45)  Source: .Blood Blood-Peripheral  Preliminary Report (01-09-24 @ 02:02):    No growth at 48 Hours    Culture - Blood (collected 01-06-24 @ 05:54)  Source: .Blood Blood-Venous  Preliminary Report (01-08-24 @ 14:02):    No growth at 48 Hours    Culture - Urine (collected 01-05-24 @ 19:40)  Source: Catheterized Catheterized  Final Report (01-07-24 @ 11:02):    >=3 organisms. Probable collection contamination.    Culture - Blood (collected 01-05-24 @ 19:20)  Source: .Blood Blood-Peripheral  Gram Stain (01-06-24 @ 12:41):    Growth in aerobic bottle: Gram Negative Rods  Final Report (01-08-24 @ 07:33):    Growth in aerobic bottle: Escherichia coli    Direct identification is available within approximately 3-5    hours either by Blood Panel Multiplexed PCR or Direct    MALDI-TOF. Details: [email or web address]  Organism: Blood Culture PCR  Escherichia coli (01-08-24 @ 07:33)  Organism: Escherichia coli (01-08-24 @ 07:33)      Method Type: JUANITO      -  Ampicillin: S <=8 These ampicillin results predict results for amoxicillin      -  Ampicillin/Sulbactam: S <=4/2      -  Aztreonam: S <=4      -  Cefazolin: S <=2      -  Cefepime: S <=2      -  Cefoxitin: S <=8      -  Ceftriaxone: S <=1      -  Ciprofloxacin: S <=0.25      -  Ertapenem: S <=0.5      -  Gentamicin: S <=2      -  Imipenem: S <=1      -  Levofloxacin: S <=0.5      -  Meropenem: S <=1      -  Piperacillin/Tazobactam: S <=8      -  Tobramycin: S <=2      -  Trimethoprim/Sulfamethoxazole: S <=0.5/9.5  Organism: Blood Culture PCR (01-08-24 @ 07:33)      Method Type: PCR      -  Escherichia coli: Detec          Radiology: reviewed       [facility], Division of Infectious Diseases  JIAN Carrizales Y. Patel, S. Shah, G. [facility]  [phone]    Name: GURINDER TURNER  Age: 89y  Gender: Male  MRN: [id number]    Interval History:  Patient seen and examined at bedside   No acute overnight events. Afebrile  No complaints  Notes reviewed    Antibiotics:  cefTRIAXone   IVPB 2000 milliGRAM(s) IV Intermittent every 24 hours      Medications:  acetaminophen     Tablet .. 650 milliGRAM(s) Oral every 6 hours PRN  aluminum hydroxide/magnesium hydroxide/simethicone Suspension 30 milliLiter(s) Oral every 4 hours PRN  amLODIPine   Tablet 10 milliGRAM(s) Oral daily  cefTRIAXone   IVPB 2000 milliGRAM(s) IV Intermittent every 24 hours  finasteride 5 milliGRAM(s) Oral daily  losartan 100 milliGRAM(s) Oral daily  melatonin 3 milliGRAM(s) Oral at bedtime PRN  ondansetron Injectable 4 milliGRAM(s) IV Push every 8 hours PRN      Review of Systems:  unable to obtain    Allergies: No Known Allergies    For details regarding the patient's past medical history, social history, family history, and other miscellaneous elements, please refer the initial infectious diseases consultation and/or the admitting history and physical examination for this admission.    Objective:  Vitals:   T(C): 36.8 (01-09-24 @ 12:47), Max: 36.8 (01-09-24 @ 12:47)  HR: 72 (01-09-24 @ 12:47) (62 - 80)  BP: 164/60 (01-09-24 @ 12:47) (162/62 - 200/60)  RR: 18 (01-09-24 @ 12:47) (18 - 18)  SpO2: 95% (01-09-24 @ 12:47) (93% - 99%)    Physical Examination:  General: no acute distress  HEENT: NC/AT, EOMI  Cardio: RRR  Resp: decreased breath sounds  Abd: soft, NT, ND,  Ext: no edema or cyanosis  Skin: warm, dry, no visible rash      Laboratory Studies:  CBC:                       10.4   8.54  )-----------( 321      ( 09 Jan 2024 06:10 )             31.5     CMP: 01-09    140  |  111<H>  |  26<H>  ----------------------------<  90  3.7   |  24  |  0.50    Ca    7.8<L>      09 Jan 2024 06:10        Urinalysis Basic - ( 09 Jan 2024 06:10 )    Color: x / Appearance: x / SG: x / pH: x  Gluc: 90 mg/dL / Ketone: x  / Bili: x / Urobili: x   Blood: x / Protein: x / Nitrite: x   Leuk Esterase: x / RBC: x / WBC x   Sq Epi: x / Non Sq Epi: x / Bacteria: x        Microbiology: reviewed    Culture - Blood (collected 01-06-24 @ 17:00)  Source: .Blood Blood-Peripheral  Preliminary Report (01-09-24 @ 02:02):    No growth at 48 Hours    Culture - Blood (collected 01-06-24 @ 16:45)  Source: .Blood Blood-Peripheral  Preliminary Report (01-09-24 @ 02:02):    No growth at 48 Hours    Culture - Blood (collected 01-06-24 @ 05:54)  Source: .Blood Blood-Venous  Preliminary Report (01-08-24 @ 14:02):    No growth at 48 Hours    Culture - Urine (collected 01-05-24 @ 19:40)  Source: Catheterized Catheterized  Final Report (01-07-24 @ 11:02):    >=3 organisms. Probable collection contamination.    Culture - Blood (collected 01-05-24 @ 19:20)  Source: .Blood Blood-Peripheral  Gram Stain (01-06-24 @ 12:41):    Growth in aerobic bottle: Gram Negative Rods  Final Report (01-08-24 @ 07:33):    Growth in aerobic bottle: Escherichia coli    Direct identification is available within approximately 3-5    hours either by Blood Panel Multiplexed PCR or Direct    MALDI-TOF. Details: [email or web address]  Organism: Blood Culture PCR  Escherichia coli (01-08-24 @ 07:33)  Organism: Escherichia coli (01-08-24 @ 07:33)      Method Type: JUANITO      -  Ampicillin: S <=8 These ampicillin results predict results for amoxicillin      -  Ampicillin/Sulbactam: S <=4/2      -  Aztreonam: S <=4      -  Cefazolin: S <=2      -  Cefepime: S <=2      -  Cefoxitin: S <=8      -  Ceftriaxone: S <=1      -  Ciprofloxacin: S <=0.25      -  Ertapenem: S <=0.5      -  Gentamicin: S <=2      -  Imipenem: S <=1      -  Levofloxacin: S <=0.5      -  Meropenem: S <=1      -  Piperacillin/Tazobactam: S <=8      -  Tobramycin: S <=2      -  Trimethoprim/Sulfamethoxazole: S <=0.5/9.5  Organism: Blood Culture PCR (01-08-24 @ 07:33)      Method Type: PCR      -  Escherichia coli: Detec          Radiology: reviewed

## 2024-01-09 NOTE — SOCIAL WORK PROGRESS NOTE - NSSWPROGRESSNOTE_GEN_ALL_CORE
PRAKASH and clincials sent to [facility] for auth. SW to follow.  PRAKASH and clincials sent to [facility] for auth. SW to follow.

## 2024-01-09 NOTE — PROGRESS NOTE ADULT - SUBJECTIVE AND OBJECTIVE BOX
Patient is a 89y old  Male who presents with a chief complaint of urinary retention (09 Jan 2024 13:44)        INTERVAL HPI/OVERNIGHT EVENTS:   no complaints  pt seen and examined         Vital Signs Last 24 Hrs  T(C): 36.8 (09 Jan 2024 12:47), Max: 36.8 (09 Jan 2024 12:47)  T(F): 98.2 (09 Jan 2024 12:47), Max: 98.2 (09 Jan 2024 12:47)  HR: 72 (09 Jan 2024 12:47) (62 - 80)  BP: 164/60 (09 Jan 2024 12:47) (162/62 - 200/60)  BP(mean): --  RR: 18 (09 Jan 2024 12:47) (18 - 18)  SpO2: 95% (09 Jan 2024 12:47) (93% - 99%)    Parameters below as of 09 Jan 2024 05:13  Patient On (Oxygen Delivery Method): room air        acetaminophen     Tablet .. 650 milliGRAM(s) Oral every 6 hours PRN  aluminum hydroxide/magnesium hydroxide/simethicone Suspension 30 milliLiter(s) Oral every 4 hours PRN  amLODIPine   Tablet 10 milliGRAM(s) Oral daily  cefTRIAXone   IVPB 2000 milliGRAM(s) IV Intermittent every 24 hours  finasteride 5 milliGRAM(s) Oral daily  losartan 100 milliGRAM(s) Oral daily  melatonin 3 milliGRAM(s) Oral at bedtime PRN  ondansetron Injectable 4 milliGRAM(s) IV Push every 8 hours PRN  warfarin 3 milliGRAM(s) Oral once      PHYSICAL EXAM:  GENERAL: NAD   EYES: conjunctiva and sclera clear  ENMT: Moist mucous membranes  NECK: Supple, No JVD, Normal thyroid  CHEST/LUNG: non labored, cta b/l  HEART: Regular rate and rhythm; No murmurs, rubs, or gallops  ABDOMEN: Soft, Nontender, Nondistended; Bowel sounds present  EXTREMITIES:  2+ Peripheral Pulses, No clubbing, no cyanosis, no edema  LYMPH: No lymphadenopathy noted  SKIN: No rashes or lesions  NEURO: no focal deficits    Consultant(s) Notes Reviewed:  [x ] YES  [ ] NO  Care Discussed with Consultants/Other Providers [ x] YES  [ ] NO    LABS:                        10.4   8.54  )-----------( 321      ( 09 Jan 2024 06:10 )             31.5     01-09    140  |  111<H>  |  26<H>  ----------------------------<  90  3.7   |  24  |  0.50    Ca    7.8<L>      09 Jan 2024 06:10      PT/INR - ( 09 Jan 2024 06:10 )   PT: 17.7 sec;   INR: 1.53 ratio           Urinalysis Basic - ( 09 Jan 2024 06:10 )    Color: x / Appearance: x / SG: x / pH: x  Gluc: 90 mg/dL / Ketone: x  / Bili: x / Urobili: x   Blood: x / Protein: x / Nitrite: x   Leuk Esterase: x / RBC: x / WBC x   Sq Epi: x / Non Sq Epi: x / Bacteria: x      CAPILLARY BLOOD GLUCOSE            Urinalysis Basic - ( 09 Jan 2024 06:10 )    Color: x / Appearance: x / SG: x / pH: x  Gluc: 90 mg/dL / Ketone: x  / Bili: x / Urobili: x   Blood: x / Protein: x / Nitrite: x   Leuk Esterase: x / RBC: x / WBC x   Sq Epi: x / Non Sq Epi: x / Bacteria: x          RADIOLOGY & ADDITIONAL TESTS:    Imaging Personally Reviewed  Reviewed consultants input

## 2024-01-09 NOTE — PROGRESS NOTE ADULT - ASSESSMENT
89 y/o M with PMH of Afib on Coumadin,  HTN, macular degeneration, BPH c/b urinary retention presented to ED to be evaluated for urinary retention as palomino was not draining    E Coli bacteremia  blood cx from 1/5 PCR positive for E Coli susceptibilities noted  1/6 BCx NGTD  urine culture contaminated but still suspect 2/2  source 2/2 urinary retention  catheter placed by urology on admission    Recommendations  c/w ceftriaxone 2g daily  Discussed midline with daughter. Pt living alone w/ wife also >90 and would have difficulty administering Abx; pt does not want to go to rehab  At this time midline cancelled. Would plan to give x7 days IV Abx until 1/12 and then switch to high dose amoxicillin 500mg TID to complete x14 day course until 1/19/24  trend temps/WBC  additional care per primary team    D/w daughter agreed to plan  D/w Dr. Comer    Infectious Diseases will continue to follow. Please call with any questions.   Denise Sarabia M.D.  [facility] Division of Infectious Diseases [phone]  For after 5 P.M. and weekends, please call [phone]       89 y/o M with PMH of Afib on Coumadin,  HTN, macular degeneration, BPH c/b urinary retention presented to ED to be evaluated for urinary retention as palomino was not draining    E Coli bacteremia  blood cx from 1/5 PCR positive for E Coli susceptibilities noted  1/6 BCx NGTD  urine culture contaminated but still suspect 2/2  source 2/2 urinary retention  catheter placed by urology on admission    Recommendations  c/w ceftriaxone 2g daily  Discussed midline with daughter. Pt living alone w/ wife also >90 and would have difficulty administering Abx; pt does not want to go to rehab  At this time midline cancelled. Would plan to give x7 days IV Abx until 1/12 and then switch to high dose amoxicillin 500mg TID to complete x14 day course until 1/19/24  trend temps/WBC  additional care per primary team    D/w daughter agreed to plan  D/w Dr. Comer    Infectious Diseases will continue to follow. Please call with any questions.   Denise Sarabia M.D.  [facility] Division of Infectious Diseases [phone]  For after 5 P.M. and weekends, please call [phone]

## 2024-01-09 NOTE — CHART NOTE - NSCHARTNOTEFT_GEN_A_CORE
Patient presented to IR for midline placement.  About to begin ultrasound evaluation, but received call from patient's daughter that the plan has changed and patient no longer will require midline.  Please d/c midline order.

## 2024-01-09 NOTE — PRE PROCEDURE NOTE - PRE PROCEDURE EVALUATION
Interventional Radiology    HPI: 89y Male with  PMH of Afib on Coumadin,  HTN, macular degeneration, BPH c/b urinary retention presented to ED to be evaluated for urinary retention for 36 hours.  Pt found to have E coli bacteremia and ID recommending midline for Abx until 1/15/24.  Daughter called for consent.    Allergies: No Known Allergies    Medications (Abx/Cardiac/Anticoagulation/Blood Products)  amLODIPine   Tablet: 10 milliGRAM(s) Oral (01-08 @ 23:10)  amLODIPine   Tablet: 10 milliGRAM(s) Oral (01-09 @ 11:49)  cefTRIAXone   IVPB: 100 mL/Hr IV Intermittent (01-09 @ 05:12)  hydrALAZINE: 10 milliGRAM(s) Oral (01-09 @ 01:58)  hydrALAZINE Injectable: 10 milliGRAM(s) IV Push (01-09 @ 06:15)  losartan: 100 milliGRAM(s) Oral (01-09 @ 11:49)  warfarin: 3 milliGRAM(s) Oral (01-08 @ 21:56)  warfarin: 2 milliGRAM(s) Oral (01-07 @ 21:36)    Data:    T(C): 36.8  HR: 72  BP: 164/60  RR: 18  SpO2: 95%    Exam  General: No acute distress  Chest: Non labored breathing  Abdomen: Non-distended  Extremities: No swelling, warm    -WBC 8.54 / HgB 10.4 / Hct 31.5 / Plt 321  -Na 140 / Cl 111 / BUN 26 / Glucose 90  -K 3.7 / CO2 24 / Cr 0.50  -ALT -- / Alk Phos -- / T.Bili --  -INR1.53      Plan: 89y Male presents for midline placement  -Risks/Benefits/alternatives explained with the patient and/or healthcare proxy and witnessed informed consent obtained.

## 2024-01-10 LAB
ANION GAP SERPL CALC-SCNC: 5 MMOL/L — SIGNIFICANT CHANGE UP (ref 5–17)
ANION GAP SERPL CALC-SCNC: 5 MMOL/L — SIGNIFICANT CHANGE UP (ref 5–17)
BUN SERPL-MCNC: 20 MG/DL — SIGNIFICANT CHANGE UP (ref 7–23)
BUN SERPL-MCNC: 20 MG/DL — SIGNIFICANT CHANGE UP (ref 7–23)
CALCIUM SERPL-MCNC: 7.8 MG/DL — LOW (ref 8.5–10.1)
CALCIUM SERPL-MCNC: 7.8 MG/DL — LOW (ref 8.5–10.1)
CHLORIDE SERPL-SCNC: 107 MMOL/L — SIGNIFICANT CHANGE UP (ref 96–108)
CHLORIDE SERPL-SCNC: 107 MMOL/L — SIGNIFICANT CHANGE UP (ref 96–108)
CO2 SERPL-SCNC: 25 MMOL/L — SIGNIFICANT CHANGE UP (ref 22–31)
CO2 SERPL-SCNC: 25 MMOL/L — SIGNIFICANT CHANGE UP (ref 22–31)
CREAT SERPL-MCNC: 0.55 MG/DL — SIGNIFICANT CHANGE UP (ref 0.5–1.3)
CREAT SERPL-MCNC: 0.55 MG/DL — SIGNIFICANT CHANGE UP (ref 0.5–1.3)
EGFR: 92 ML/MIN/1.73M2 — SIGNIFICANT CHANGE UP
EGFR: 92 ML/MIN/1.73M2 — SIGNIFICANT CHANGE UP
GLUCOSE SERPL-MCNC: 98 MG/DL — SIGNIFICANT CHANGE UP (ref 70–99)
GLUCOSE SERPL-MCNC: 98 MG/DL — SIGNIFICANT CHANGE UP (ref 70–99)
HCT VFR BLD CALC: 29.8 % — LOW (ref 39–50)
HCT VFR BLD CALC: 29.8 % — LOW (ref 39–50)
HGB BLD-MCNC: 9.8 G/DL — LOW (ref 13–17)
HGB BLD-MCNC: 9.8 G/DL — LOW (ref 13–17)
INR BLD: 1.37 RATIO — HIGH (ref 0.85–1.18)
INR BLD: 1.37 RATIO — HIGH (ref 0.85–1.18)
MCHC RBC-ENTMCNC: 31.1 PG — SIGNIFICANT CHANGE UP (ref 27–34)
MCHC RBC-ENTMCNC: 31.1 PG — SIGNIFICANT CHANGE UP (ref 27–34)
MCHC RBC-ENTMCNC: 32.9 GM/DL — SIGNIFICANT CHANGE UP (ref 32–36)
MCHC RBC-ENTMCNC: 32.9 GM/DL — SIGNIFICANT CHANGE UP (ref 32–36)
MCV RBC AUTO: 94.6 FL — SIGNIFICANT CHANGE UP (ref 80–100)
MCV RBC AUTO: 94.6 FL — SIGNIFICANT CHANGE UP (ref 80–100)
NRBC # BLD: 0 /100 WBCS — SIGNIFICANT CHANGE UP (ref 0–0)
NRBC # BLD: 0 /100 WBCS — SIGNIFICANT CHANGE UP (ref 0–0)
PLATELET # BLD AUTO: 323 K/UL — SIGNIFICANT CHANGE UP (ref 150–400)
PLATELET # BLD AUTO: 323 K/UL — SIGNIFICANT CHANGE UP (ref 150–400)
POTASSIUM SERPL-MCNC: 3.7 MMOL/L — SIGNIFICANT CHANGE UP (ref 3.5–5.3)
POTASSIUM SERPL-MCNC: 3.7 MMOL/L — SIGNIFICANT CHANGE UP (ref 3.5–5.3)
POTASSIUM SERPL-SCNC: 3.7 MMOL/L — SIGNIFICANT CHANGE UP (ref 3.5–5.3)
POTASSIUM SERPL-SCNC: 3.7 MMOL/L — SIGNIFICANT CHANGE UP (ref 3.5–5.3)
PROTHROM AB SERPL-ACNC: 15.9 SEC — HIGH (ref 9.5–13)
PROTHROM AB SERPL-ACNC: 15.9 SEC — HIGH (ref 9.5–13)
RBC # BLD: 3.15 M/UL — LOW (ref 4.2–5.8)
RBC # BLD: 3.15 M/UL — LOW (ref 4.2–5.8)
RBC # FLD: 15.6 % — HIGH (ref 10.3–14.5)
RBC # FLD: 15.6 % — HIGH (ref 10.3–14.5)
SODIUM SERPL-SCNC: 137 MMOL/L — SIGNIFICANT CHANGE UP (ref 135–145)
SODIUM SERPL-SCNC: 137 MMOL/L — SIGNIFICANT CHANGE UP (ref 135–145)
WBC # BLD: 8.48 K/UL — SIGNIFICANT CHANGE UP (ref 3.8–10.5)
WBC # BLD: 8.48 K/UL — SIGNIFICANT CHANGE UP (ref 3.8–10.5)
WBC # FLD AUTO: 8.48 K/UL — SIGNIFICANT CHANGE UP (ref 3.8–10.5)
WBC # FLD AUTO: 8.48 K/UL — SIGNIFICANT CHANGE UP (ref 3.8–10.5)

## 2024-01-10 RX ORDER — WARFARIN SODIUM 2.5 MG/1
5 TABLET ORAL ONCE
Refills: 0 | Status: COMPLETED | OUTPATIENT
Start: 2024-01-10 | End: 2024-01-10

## 2024-01-10 RX ADMIN — WARFARIN SODIUM 5 MILLIGRAM(S): 2.5 TABLET ORAL at 21:25

## 2024-01-10 RX ADMIN — CEFTRIAXONE 100 MILLIGRAM(S): 500 INJECTION, POWDER, FOR SOLUTION INTRAMUSCULAR; INTRAVENOUS at 05:55

## 2024-01-10 RX ADMIN — FINASTERIDE 5 MILLIGRAM(S): 5 TABLET, FILM COATED ORAL at 11:12

## 2024-01-10 RX ADMIN — LOSARTAN POTASSIUM 100 MILLIGRAM(S): 100 TABLET, FILM COATED ORAL at 05:55

## 2024-01-10 RX ADMIN — AMLODIPINE BESYLATE 10 MILLIGRAM(S): 2.5 TABLET ORAL at 05:55

## 2024-01-10 NOTE — PROGRESS NOTE ADULT - SUBJECTIVE AND OBJECTIVE BOX
Patient is a 89y old  Male who presents with a chief complaint of urinary retention (10 Gio 2024 13:37)      INTERVAL HPI/OVERNIGHT EVENTS: noted  pt seen and examined this am   events noted  feels well      Vital Signs Last 24 Hrs  T(C): 36.7 (10 Gio 2024 13:44), Max: 36.9 (10 Gio 2024 05:32)  T(F): 98 (10 Gio 2024 13:44), Max: 98.5 (10 Gio 2024 05:32)  HR: 63 (10 Gio 2024 13:44) (63 - 64)  BP: 166/57 (10 Gio 2024 13:44) (166/57 - 170/64)  BP(mean): --  RR: 18 (10 Gio 2024 13:44) (18 - 18)  SpO2: 96% (10 Gio 2024 13:44) (93% - 96%)    Parameters below as of 10 Gio 2024 13:44  Patient On (Oxygen Delivery Method): room air        acetaminophen     Tablet .. 650 milliGRAM(s) Oral every 6 hours PRN  aluminum hydroxide/magnesium hydroxide/simethicone Suspension 30 milliLiter(s) Oral every 4 hours PRN  amLODIPine   Tablet 10 milliGRAM(s) Oral daily  cefTRIAXone   IVPB 2000 milliGRAM(s) IV Intermittent every 24 hours  finasteride 5 milliGRAM(s) Oral daily  losartan 100 milliGRAM(s) Oral daily  melatonin 3 milliGRAM(s) Oral at bedtime PRN  ondansetron Injectable 4 milliGRAM(s) IV Push every 8 hours PRN      PHYSICAL EXAM:  GENERAL: NAD,   EYES: conjunctiva and sclera clear  ENMT: Moist mucous membranes  NECK: Supple, No JVD, Normal thyroid  CHEST/LUNG: non labored, cta b/l  HEART: Regular rate and rhythm; No murmurs, rubs, or gallops  ABDOMEN: Soft, Nontender, Nondistended; Bowel sounds present  EXTREMITIES:  2+ Peripheral Pulses, No clubbing, cyanosis, or edema  LYMPH: No lymphadenopathy noted  SKIN: No rashes or lesions    Consultant(s) Notes Reviewed:  [x ] YES  [ ] NO  Care Discussed with Consultants/Other Providers [ x] YES  [ ] NO    LABS:                        9.8    8.48  )-----------( 323      ( 10 Gio 2024 06:10 )             29.8     01-10    137  |  107  |  20  ----------------------------<  98  3.7   |  25  |  0.55    Ca    7.8<L>      10 Gio 2024 06:10      PT/INR - ( 10 Gio 2024 06:10 )   PT: 15.9 sec;   INR: 1.37 ratio           Urinalysis Basic - ( 10 Gio 2024 06:10 )    Color: x / Appearance: x / SG: x / pH: x  Gluc: 98 mg/dL / Ketone: x  / Bili: x / Urobili: x   Blood: x / Protein: x / Nitrite: x   Leuk Esterase: x / RBC: x / WBC x   Sq Epi: x / Non Sq Epi: x / Bacteria: x      CAPILLARY BLOOD GLUCOSE            Urinalysis Basic - ( 10 Gio 2024 06:10 )    Color: x / Appearance: x / SG: x / pH: x  Gluc: 98 mg/dL / Ketone: x  / Bili: x / Urobili: x   Blood: x / Protein: x / Nitrite: x   Leuk Esterase: x / RBC: x / WBC x   Sq Epi: x / Non Sq Epi: x / Bacteria: x          RADIOLOGY & ADDITIONAL TESTS:    Imaging Personally Reviewed:  [x ] YES  [ ] NO

## 2024-01-10 NOTE — CASE MANAGEMENT PROGRESS NOTE - NSCMPROGRESSNOTE_GEN_ALL_CORE
Discussed pt on rounds, pt remains acute, pt family declines midline at present. on IV ceftriaxone 2g daily until 1/12. CM will continue to collaborate with interdisciplinary team and remain available to assist.

## 2024-01-10 NOTE — PROGRESS NOTE ADULT - ASSESSMENT
89m with AF on coumadin, HTN, BPH with chronic palomino, p/w urinary retention, sepsis due to gram negative bacteremia and uti    urinary retention/genital trauma  palomino replaced  urology following    sepsis/uti  ecoli bacteremia  on abx till friday  ID following    supratherapeutic INR  resolved  daily INRs    af  continue coumadin

## 2024-01-10 NOTE — PROGRESS NOTE ADULT - ASSESSMENT
89 y/o M with PMH of Afib on Coumadin,  HTN, macular degeneration, BPH c/b urinary retention presented to ED to be evaluated for urinary retention as palomino was not draining    E Coli bacteremia  blood cx from 1/5 PCR positive for E Coli susceptibilities noted  1/6 BCx NGTD  urine culture contaminated but still suspect 2/2  source 2/2 urinary retention  catheter placed by urology on admission    Recommendations  c/w ceftriaxone 2g daily  Discussed midline with daughter. Pt living alone w/ wife also >90 and would have difficulty administering Abx; pt does not want to go to rehab  At this time midline cancelled. Would plan to give x7 days IV Abx until 1/12 and then switch to high dose amoxicillin to complete x14 day course until 1/19/24  trend temps/WBC  additional care per primary team    D/w daughter agreed to plan  D/w Dr. Guajardo    Infectious Diseases will continue to follow. Please call with any questions.   Denise Sarabia M.D.  [facility] Division of Infectious Diseases [phone]  For after 5 P.M. and weekends, please call [phone]       89 y/o M with PMH of Afib on Coumadin,  HTN, macular degeneration, BPH c/b urinary retention presented to ED to be evaluated for urinary retention as palomino was not draining    E Coli bacteremia  blood cx from 1/5 PCR positive for E Coli susceptibilities noted  1/6 BCx NGTD  urine culture contaminated but still suspect 2/2  source 2/2 urinary retention  catheter placed by urology on admission    Recommendations  c/w ceftriaxone 2g daily  Discussed midline with daughter. Pt living alone w/ wife also >90 and would have difficulty administering Abx; pt does not want to go to rehab  At this time midline cancelled. Would plan to give x7 days IV Abx until 1/12 and then switch to high dose amoxicillin to complete x14 day course until 1/19/24  trend temps/WBC  additional care per primary team    D/w daughter agreed to plan  D/w Dr. Guajardo    Infectious Diseases will continue to follow. Please call with any questions.   Denise Sarabia M.D.  [facility] Division of Infectious Diseases [phone]  For after 5 P.M. and weekends, please call [phone]

## 2024-01-10 NOTE — PROGRESS NOTE ADULT - SUBJECTIVE AND OBJECTIVE BOX
[facility], Division of Infectious Diseases  JIAN Carrizales Y. Patel, S. Shah, G. [facility]  [phone]    Name: GURINDER TURNER  Age: 89y  Gender: Male  MRN: [id number]    Interval History:  Patient seen and examined at bedside   No acute overnight events. Afebrile  Daughter at bedside  Notes reviewed    Antibiotics:  cefTRIAXone   IVPB 2000 milliGRAM(s) IV Intermittent every 24 hours      Medications:  acetaminophen     Tablet .. 650 milliGRAM(s) Oral every 6 hours PRN  aluminum hydroxide/magnesium hydroxide/simethicone Suspension 30 milliLiter(s) Oral every 4 hours PRN  amLODIPine   Tablet 10 milliGRAM(s) Oral daily  cefTRIAXone   IVPB 2000 milliGRAM(s) IV Intermittent every 24 hours  finasteride 5 milliGRAM(s) Oral daily  losartan 100 milliGRAM(s) Oral daily  melatonin 3 milliGRAM(s) Oral at bedtime PRN  ondansetron Injectable 4 milliGRAM(s) IV Push every 8 hours PRN      Review of Systems:  Review of systems otherwise negative except as previously noted.    Allergies: No Known Allergies    For details regarding the patient's past medical history, social history, family history, and other miscellaneous elements, please refer the initial infectious diseases consultation and/or the admitting history and physical examination for this admission.    Objective:  Vitals:   T(C): 36.9 (01-10-24 @ 05:32), Max: 36.9 (01-10-24 @ 05:32)  HR: 63 (01-10-24 @ 05:32) (63 - 64)  BP: 166/70 (01-10-24 @ 05:32) (166/70 - 170/64)  RR: 18 (01-10-24 @ 05:32) (18 - 18)  SpO2: 93% (01-10-24 @ 05:32) (93% - 94%)    Physical Examination:  General: no acute distress  HEENT: NC/AT, EOMI  Cardio: S1, S2 heard, RRR, no murmurs  Resp: decreased breath sounds  Abd: soft, NT, ND  Ext: no edema or cyanosis  Skin: warm, dry, no visible rash      Laboratory Studies:  CBC:                       9.8    8.48  )-----------( 323      ( 10 Gio 2024 06:10 )             29.8     CMP: 01-10    137  |  107  |  20  ----------------------------<  98  3.7   |  25  |  0.55    Ca    7.8<L>      10 Gio 2024 06:10        Urinalysis Basic - ( 10 Gio 2024 06:10 )    Color: x / Appearance: x / SG: x / pH: x  Gluc: 98 mg/dL / Ketone: x  / Bili: x / Urobili: x   Blood: x / Protein: x / Nitrite: x   Leuk Esterase: x / RBC: x / WBC x   Sq Epi: x / Non Sq Epi: x / Bacteria: x        Microbiology: reviewed    Culture - Blood (collected 01-06-24 @ 17:00)  Source: .Blood Blood-Peripheral  Preliminary Report (01-10-24 @ 02:01):    No growth at 72 Hours    Culture - Blood (collected 01-06-24 @ 16:45)  Source: .Blood Blood-Peripheral  Preliminary Report (01-10-24 @ 02:01):    No growth at 72 Hours    Culture - Blood (collected 01-06-24 @ 05:54)  Source: .Blood Blood-Venous  Preliminary Report (01-10-24 @ 13:01):    No growth at 4 days    Culture - Urine (collected 01-05-24 @ 19:40)  Source: Catheterized Catheterized  Final Report (01-07-24 @ 11:02):    >=3 organisms. Probable collection contamination.    Culture - Blood (collected 01-05-24 @ 19:20)  Source: .Blood Blood-Peripheral  Gram Stain (01-06-24 @ 12:41):    Growth in aerobic bottle: Gram Negative Rods  Final Report (01-08-24 @ 07:33):    Growth in aerobic bottle: Escherichia coli    Direct identification is available within approximately 3-5    hours either by Blood Panel Multiplexed PCR or Direct    MALDI-TOF. Details: [email or web address]  Organism: Blood Culture PCR  Escherichia coli (01-08-24 @ 07:33)  Organism: Escherichia coli (01-08-24 @ 07:33)      -  Levofloxacin: S <=0.5      -  Tobramycin: S <=2      -  Aztreonam: S <=4      -  Gentamicin: S <=2      -  Cefazolin: S <=2      -  Cefepime: S <=2      -  Piperacillin/Tazobactam: S <=8      -  Ciprofloxacin: S <=0.25      -  Imipenem: S <=1      -  Ceftriaxone: S <=1      -  Ampicillin: S <=8 These ampicillin results predict results for amoxicillin      Method Type: JUANITO      -  Meropenem: S <=1      -  Ampicillin/Sulbactam: S <=4/2      -  Cefoxitin: S <=8      -  Trimethoprim/Sulfamethoxazole: S <=0.5/9.5      -  Ertapenem: S <=0.5  Organism: Blood Culture PCR (01-08-24 @ 07:33)      -  Escherichia coli: Detec      Method Type: PCR          Radiology: reviewed       [facility], Division of Infectious Diseases  JIAN Carrizales Y. Patel, S. Shah, G. [facility]  [phone]    Name: GURINDER TURNER  Age: 89y  Gender: Male  MRN: [id number]    Interval History:  Patient seen and examined at bedside   No acute overnight events. Afebrile  Daughter at bedside  Notes reviewed    Antibiotics:  cefTRIAXone   IVPB 2000 milliGRAM(s) IV Intermittent every 24 hours      Medications:  acetaminophen     Tablet .. 650 milliGRAM(s) Oral every 6 hours PRN  aluminum hydroxide/magnesium hydroxide/simethicone Suspension 30 milliLiter(s) Oral every 4 hours PRN  amLODIPine   Tablet 10 milliGRAM(s) Oral daily  cefTRIAXone   IVPB 2000 milliGRAM(s) IV Intermittent every 24 hours  finasteride 5 milliGRAM(s) Oral daily  losartan 100 milliGRAM(s) Oral daily  melatonin 3 milliGRAM(s) Oral at bedtime PRN  ondansetron Injectable 4 milliGRAM(s) IV Push every 8 hours PRN      Review of Systems:  Review of systems otherwise negative except as previously noted.    Allergies: No Known Allergies    For details regarding the patient's past medical history, social history, family history, and other miscellaneous elements, please refer the initial infectious diseases consultation and/or the admitting history and physical examination for this admission.    Objective:  Vitals:   T(C): 36.9 (01-10-24 @ 05:32), Max: 36.9 (01-10-24 @ 05:32)  HR: 63 (01-10-24 @ 05:32) (63 - 64)  BP: 166/70 (01-10-24 @ 05:32) (166/70 - 170/64)  RR: 18 (01-10-24 @ 05:32) (18 - 18)  SpO2: 93% (01-10-24 @ 05:32) (93% - 94%)    Physical Examination:  General: no acute distress  HEENT: NC/AT, EOMI  Cardio: S1, S2 heard, RRR, no murmurs  Resp: decreased breath sounds  Abd: soft, NT, ND  Ext: no edema or cyanosis  Skin: warm, dry, no visible rash      Laboratory Studies:  CBC:                       9.8    8.48  )-----------( 323      ( 10 Gio 2024 06:10 )             29.8     CMP: 01-10    137  |  107  |  20  ----------------------------<  98  3.7   |  25  |  0.55    Ca    7.8<L>      10 Gio 2024 06:10        Urinalysis Basic - ( 10 Gio 2024 06:10 )    Color: x / Appearance: x / SG: x / pH: x  Gluc: 98 mg/dL / Ketone: x  / Bili: x / Urobili: x   Blood: x / Protein: x / Nitrite: x   Leuk Esterase: x / RBC: x / WBC x   Sq Epi: x / Non Sq Epi: x / Bacteria: x        Microbiology: reviewed    Culture - Blood (collected 01-06-24 @ 17:00)  Source: .Blood Blood-Peripheral  Preliminary Report (01-10-24 @ 02:01):    No growth at 72 Hours    Culture - Blood (collected 01-06-24 @ 16:45)  Source: .Blood Blood-Peripheral  Preliminary Report (01-10-24 @ 02:01):    No growth at 72 Hours    Culture - Blood (collected 01-06-24 @ 05:54)  Source: .Blood Blood-Venous  Preliminary Report (01-10-24 @ 13:01):    No growth at 4 days    Culture - Urine (collected 01-05-24 @ 19:40)  Source: Catheterized Catheterized  Final Report (01-07-24 @ 11:02):    >=3 organisms. Probable collection contamination.    Culture - Blood (collected 01-05-24 @ 19:20)  Source: .Blood Blood-Peripheral  Gram Stain (01-06-24 @ 12:41):    Growth in aerobic bottle: Gram Negative Rods  Final Report (01-08-24 @ 07:33):    Growth in aerobic bottle: Escherichia coli    Direct identification is available within approximately 3-5    hours either by Blood Panel Multiplexed PCR or Direct    MALDI-TOF. Details: [email or web address]  Organism: Blood Culture PCR  Escherichia coli (01-08-24 @ 07:33)  Organism: Escherichia coli (01-08-24 @ 07:33)      -  Levofloxacin: S <=0.5      -  Tobramycin: S <=2      -  Aztreonam: S <=4      -  Gentamicin: S <=2      -  Cefazolin: S <=2      -  Cefepime: S <=2      -  Piperacillin/Tazobactam: S <=8      -  Ciprofloxacin: S <=0.25      -  Imipenem: S <=1      -  Ceftriaxone: S <=1      -  Ampicillin: S <=8 These ampicillin results predict results for amoxicillin      Method Type: JUANITO      -  Meropenem: S <=1      -  Ampicillin/Sulbactam: S <=4/2      -  Cefoxitin: S <=8      -  Trimethoprim/Sulfamethoxazole: S <=0.5/9.5      -  Ertapenem: S <=0.5  Organism: Blood Culture PCR (01-08-24 @ 07:33)      -  Escherichia coli: Detec      Method Type: PCR          Radiology: reviewed

## 2024-01-11 LAB
ANION GAP SERPL CALC-SCNC: 4 MMOL/L — LOW (ref 5–17)
ANION GAP SERPL CALC-SCNC: 4 MMOL/L — LOW (ref 5–17)
BUN SERPL-MCNC: 16 MG/DL — SIGNIFICANT CHANGE UP (ref 7–23)
BUN SERPL-MCNC: 16 MG/DL — SIGNIFICANT CHANGE UP (ref 7–23)
CALCIUM SERPL-MCNC: 8 MG/DL — LOW (ref 8.5–10.1)
CALCIUM SERPL-MCNC: 8 MG/DL — LOW (ref 8.5–10.1)
CHLORIDE SERPL-SCNC: 108 MMOL/L — SIGNIFICANT CHANGE UP (ref 96–108)
CHLORIDE SERPL-SCNC: 108 MMOL/L — SIGNIFICANT CHANGE UP (ref 96–108)
CO2 SERPL-SCNC: 26 MMOL/L — SIGNIFICANT CHANGE UP (ref 22–31)
CO2 SERPL-SCNC: 26 MMOL/L — SIGNIFICANT CHANGE UP (ref 22–31)
CREAT SERPL-MCNC: 0.59 MG/DL — SIGNIFICANT CHANGE UP (ref 0.5–1.3)
CREAT SERPL-MCNC: 0.59 MG/DL — SIGNIFICANT CHANGE UP (ref 0.5–1.3)
CULTURE RESULTS: SIGNIFICANT CHANGE UP
CULTURE RESULTS: SIGNIFICANT CHANGE UP
EGFR: 90 ML/MIN/1.73M2 — SIGNIFICANT CHANGE UP
EGFR: 90 ML/MIN/1.73M2 — SIGNIFICANT CHANGE UP
GLUCOSE SERPL-MCNC: 87 MG/DL — SIGNIFICANT CHANGE UP (ref 70–99)
GLUCOSE SERPL-MCNC: 87 MG/DL — SIGNIFICANT CHANGE UP (ref 70–99)
HCT VFR BLD CALC: 31.4 % — LOW (ref 39–50)
HCT VFR BLD CALC: 31.4 % — LOW (ref 39–50)
HGB BLD-MCNC: 9.9 G/DL — LOW (ref 13–17)
HGB BLD-MCNC: 9.9 G/DL — LOW (ref 13–17)
INR BLD: 1.36 RATIO — HIGH (ref 0.85–1.18)
INR BLD: 1.36 RATIO — HIGH (ref 0.85–1.18)
MCHC RBC-ENTMCNC: 30.2 PG — SIGNIFICANT CHANGE UP (ref 27–34)
MCHC RBC-ENTMCNC: 30.2 PG — SIGNIFICANT CHANGE UP (ref 27–34)
MCHC RBC-ENTMCNC: 31.5 GM/DL — LOW (ref 32–36)
MCHC RBC-ENTMCNC: 31.5 GM/DL — LOW (ref 32–36)
MCV RBC AUTO: 95.7 FL — SIGNIFICANT CHANGE UP (ref 80–100)
MCV RBC AUTO: 95.7 FL — SIGNIFICANT CHANGE UP (ref 80–100)
NRBC # BLD: 0 /100 WBCS — SIGNIFICANT CHANGE UP (ref 0–0)
NRBC # BLD: 0 /100 WBCS — SIGNIFICANT CHANGE UP (ref 0–0)
PLATELET # BLD AUTO: 366 K/UL — SIGNIFICANT CHANGE UP (ref 150–400)
PLATELET # BLD AUTO: 366 K/UL — SIGNIFICANT CHANGE UP (ref 150–400)
POTASSIUM SERPL-MCNC: 4 MMOL/L — SIGNIFICANT CHANGE UP (ref 3.5–5.3)
POTASSIUM SERPL-MCNC: 4 MMOL/L — SIGNIFICANT CHANGE UP (ref 3.5–5.3)
POTASSIUM SERPL-SCNC: 4 MMOL/L — SIGNIFICANT CHANGE UP (ref 3.5–5.3)
POTASSIUM SERPL-SCNC: 4 MMOL/L — SIGNIFICANT CHANGE UP (ref 3.5–5.3)
PROTHROM AB SERPL-ACNC: 15.8 SEC — HIGH (ref 9.5–13)
PROTHROM AB SERPL-ACNC: 15.8 SEC — HIGH (ref 9.5–13)
RBC # BLD: 3.28 M/UL — LOW (ref 4.2–5.8)
RBC # BLD: 3.28 M/UL — LOW (ref 4.2–5.8)
RBC # FLD: 15.5 % — HIGH (ref 10.3–14.5)
RBC # FLD: 15.5 % — HIGH (ref 10.3–14.5)
SODIUM SERPL-SCNC: 138 MMOL/L — SIGNIFICANT CHANGE UP (ref 135–145)
SODIUM SERPL-SCNC: 138 MMOL/L — SIGNIFICANT CHANGE UP (ref 135–145)
SPECIMEN SOURCE: SIGNIFICANT CHANGE UP
SPECIMEN SOURCE: SIGNIFICANT CHANGE UP
WBC # BLD: 8.99 K/UL — SIGNIFICANT CHANGE UP (ref 3.8–10.5)
WBC # BLD: 8.99 K/UL — SIGNIFICANT CHANGE UP (ref 3.8–10.5)
WBC # FLD AUTO: 8.99 K/UL — SIGNIFICANT CHANGE UP (ref 3.8–10.5)
WBC # FLD AUTO: 8.99 K/UL — SIGNIFICANT CHANGE UP (ref 3.8–10.5)

## 2024-01-11 RX ORDER — ASCORBIC ACID 60 MG
500 TABLET,CHEWABLE ORAL DAILY
Refills: 0 | Status: CANCELLED | OUTPATIENT
Start: 2024-01-11 | End: 2024-01-13

## 2024-01-11 RX ORDER — WARFARIN SODIUM 2.5 MG/1
7.5 TABLET ORAL ONCE
Refills: 0 | Status: COMPLETED | OUTPATIENT
Start: 2024-01-11 | End: 2024-01-11

## 2024-01-11 RX ORDER — MULTIVIT-MIN/FERROUS GLUCONATE 9 MG/15 ML
1 LIQUID (ML) ORAL DAILY
Refills: 0 | Status: CANCELLED | OUTPATIENT
Start: 2024-01-11 | End: 2024-01-13

## 2024-01-11 RX ADMIN — LOSARTAN POTASSIUM 100 MILLIGRAM(S): 100 TABLET, FILM COATED ORAL at 05:23

## 2024-01-11 RX ADMIN — FINASTERIDE 5 MILLIGRAM(S): 5 TABLET, FILM COATED ORAL at 13:09

## 2024-01-11 RX ADMIN — CEFTRIAXONE 100 MILLIGRAM(S): 500 INJECTION, POWDER, FOR SOLUTION INTRAMUSCULAR; INTRAVENOUS at 06:23

## 2024-01-11 RX ADMIN — WARFARIN SODIUM 7.5 MILLIGRAM(S): 2.5 TABLET ORAL at 21:04

## 2024-01-11 RX ADMIN — AMLODIPINE BESYLATE 10 MILLIGRAM(S): 2.5 TABLET ORAL at 05:22

## 2024-01-11 NOTE — PROGRESS NOTE ADULT - ASSESSMENT
89m with AF on coumadin, HTN, BPH with chronic palomino, p/w urinary retention, sepsis due to gram negative bacteremia and uti    urinary retention/genital trauma  palomino replaced  urology following    sepsis/uti  ecoli bacteremia  on abx till friday  ID following    supratherapeutic INR  resolved  daily INRs    af  continue coumadin    [facility]   [phone] 89m with AF on coumadin, HTN, BPH with chronic palomino, p/w urinary retention, sepsis due to gram negative bacteremia and uti    urinary retention/genital trauma  palomino replaced  urology following    sepsis/uti  ecoli bacteremia  on abx till friday  ID following    supratherapeutic INR  resolved  daily INRs    af  continue coumadin    [facility]   [phone]

## 2024-01-11 NOTE — PROGRESS NOTE ADULT - NUTRITIONAL ASSESSMENT
This patient has been assessed with a concern for Malnutrition and has been determined to have a diagnosis/diagnoses of Moderate protein-calorie malnutrition.    This patient is being managed with:   Diet Regular-  DASH/TLC {Sodium & Cholesterol Restricted}  Entered: Jan 5 2024  9:06PM    The following pending diet order is being considered for treatment of Moderate protein-calorie malnutrition:  Diet DASH/TLC-  Sodium & Cholesterol Restricted  Supplement Feeding Modality:  Oral  Ensure Plus High Protein Cans or Servings Per Day:  1       Frequency:  Two Times a day  Entered: Jan 8 2024 12:50PM  

## 2024-01-11 NOTE — PROGRESS NOTE ADULT - SUBJECTIVE AND OBJECTIVE BOX
[facility], Division of Infectious Diseases  JIAN Carrizales Y. Patel, S. Shah, G. [facility]  [phone]    Name: GURINDER TURNER  Age: 89y  Gender: Male  MRN: [id number]    Interval History:  Patient seen and examined at bedside   No acute overnight events. Afebrile  Notes reviewed    Antibiotics:  cefTRIAXone   IVPB 2000 milliGRAM(s) IV Intermittent every 24 hours      Medications:  acetaminophen     Tablet .. 650 milliGRAM(s) Oral every 6 hours PRN  aluminum hydroxide/magnesium hydroxide/simethicone Suspension 30 milliLiter(s) Oral every 4 hours PRN  amLODIPine   Tablet 10 milliGRAM(s) Oral daily  cefTRIAXone   IVPB 2000 milliGRAM(s) IV Intermittent every 24 hours  finasteride 5 milliGRAM(s) Oral daily  losartan 100 milliGRAM(s) Oral daily  melatonin 3 milliGRAM(s) Oral at bedtime PRN  ondansetron Injectable 4 milliGRAM(s) IV Push every 8 hours PRN  warfarin 7.5 milliGRAM(s) Oral once      Review of Systems:  A 10-point review of systems was obtained.   Review of systems otherwise negative except as previously noted.    Allergies: No Known Allergies    For details regarding the patient's past medical history, social history, family history, and other miscellaneous elements, please refer the initial infectious diseases consultation and/or the admitting history and physical examination for this admission.    Objective:  Vitals:   T(C): 37.3 (01-11-24 @ 05:26), Max: 37.3 (01-11-24 @ 05:26)  HR: 64 (01-11-24 @ 06:14) (60 - 78)  BP: 165/67 (01-11-24 @ 06:14) (140/68 - 175/62)  RR: 18 (01-11-24 @ 05:26) (18 - 18)  SpO2: 92% (01-11-24 @ 05:26) (92% - 96%)    Physical Examination:  General: no acute distress  HEENT: NC/AT, EOMI,   Cardio: S1, S2 heard, RRR, no murmurs  Resp: decreased breath sounds  Abd: soft, NT, ND,  Ext: no edema or cyanosis  Skin: warm, dry, no visible rash      Laboratory Studies:  CBC:                       9.9    8.99  )-----------( 366      ( 11 Jan 2024 08:08 )             31.4     CMP: 01-11    138  |  108  |  16  ----------------------------<  87  4.0   |  26  |  0.59    Ca    8.0<L>      11 Jan 2024 08:08        Urinalysis Basic - ( 11 Jan 2024 08:08 )    Color: x / Appearance: x / SG: x / pH: x  Gluc: 87 mg/dL / Ketone: x  / Bili: x / Urobili: x   Blood: x / Protein: x / Nitrite: x   Leuk Esterase: x / RBC: x / WBC x   Sq Epi: x / Non Sq Epi: x / Bacteria: x        Microbiology: reviewed    Culture - Blood (collected 01-06-24 @ 17:00)  Source: .Blood Blood-Peripheral  Preliminary Report (01-11-24 @ 02:00):    No growth at 4 days    Culture - Blood (collected 01-06-24 @ 16:45)  Source: .Blood Blood-Peripheral  Preliminary Report (01-11-24 @ 02:00):    No growth at 4 days    Culture - Blood (collected 01-06-24 @ 05:54)  Source: .Blood Blood-Venous  Preliminary Report (01-10-24 @ 13:01):    No growth at 4 days    Culture - Urine (collected 01-05-24 @ 19:40)  Source: Catheterized Catheterized  Final Report (01-07-24 @ 11:02):    >=3 organisms. Probable collection contamination.    Culture - Blood (collected 01-05-24 @ 19:20)  Source: .Blood Blood-Peripheral  Gram Stain (01-06-24 @ 12:41):    Growth in aerobic bottle: Gram Negative Rods  Final Report (01-08-24 @ 07:33):    Growth in aerobic bottle: Escherichia coli    Direct identification is available within approximately 3-5    hours either by Blood Panel Multiplexed PCR or Direct    MALDI-TOF. Details: [email or web address]  Organism: Blood Culture PCR  Escherichia coli (01-08-24 @ 07:33)  Organism: Escherichia coli (01-08-24 @ 07:33)      -  Levofloxacin: S <=0.5      -  Tobramycin: S <=2      -  Aztreonam: S <=4      -  Gentamicin: S <=2      -  Cefazolin: S <=2      -  Cefepime: S <=2      -  Piperacillin/Tazobactam: S <=8      -  Ciprofloxacin: S <=0.25      -  Imipenem: S <=1      -  Ceftriaxone: S <=1      -  Ampicillin: S <=8 These ampicillin results predict results for amoxicillin      Method Type: JUANITO      -  Meropenem: S <=1      -  Ampicillin/Sulbactam: S <=4/2      -  Cefoxitin: S <=8      -  Trimethoprim/Sulfamethoxazole: S <=0.5/9.5      -  Ertapenem: S <=0.5  Organism: Blood Culture PCR (01-08-24 @ 07:33)      -  Escherichia coli: Detec      Method Type: PCR          Radiology: reviewed       [facility], Division of Infectious Diseases  JIAN Carrizales Y. Patel, S. Shah, G. [facility]  [phone]    Name: GURINDER TURNER  Age: 89y  Gender: Male  MRN: [id number]    Interval History:  Patient seen and examined at bedside   No acute overnight events. Afebrile  Notes reviewed    Antibiotics:  cefTRIAXone   IVPB 2000 milliGRAM(s) IV Intermittent every 24 hours      Medications:  acetaminophen     Tablet .. 650 milliGRAM(s) Oral every 6 hours PRN  aluminum hydroxide/magnesium hydroxide/simethicone Suspension 30 milliLiter(s) Oral every 4 hours PRN  amLODIPine   Tablet 10 milliGRAM(s) Oral daily  cefTRIAXone   IVPB 2000 milliGRAM(s) IV Intermittent every 24 hours  finasteride 5 milliGRAM(s) Oral daily  losartan 100 milliGRAM(s) Oral daily  melatonin 3 milliGRAM(s) Oral at bedtime PRN  ondansetron Injectable 4 milliGRAM(s) IV Push every 8 hours PRN  warfarin 7.5 milliGRAM(s) Oral once      Review of Systems:  A 10-point review of systems was obtained.   Review of systems otherwise negative except as previously noted.    Allergies: No Known Allergies    For details regarding the patient's past medical history, social history, family history, and other miscellaneous elements, please refer the initial infectious diseases consultation and/or the admitting history and physical examination for this admission.    Objective:  Vitals:   T(C): 37.3 (01-11-24 @ 05:26), Max: 37.3 (01-11-24 @ 05:26)  HR: 64 (01-11-24 @ 06:14) (60 - 78)  BP: 165/67 (01-11-24 @ 06:14) (140/68 - 175/62)  RR: 18 (01-11-24 @ 05:26) (18 - 18)  SpO2: 92% (01-11-24 @ 05:26) (92% - 96%)    Physical Examination:  General: no acute distress  HEENT: NC/AT, EOMI,   Cardio: S1, S2 heard, RRR, no murmurs  Resp: decreased breath sounds  Abd: soft, NT, ND,  Ext: no edema or cyanosis  Skin: warm, dry, no visible rash      Laboratory Studies:  CBC:                       9.9    8.99  )-----------( 366      ( 11 Jan 2024 08:08 )             31.4     CMP: 01-11    138  |  108  |  16  ----------------------------<  87  4.0   |  26  |  0.59    Ca    8.0<L>      11 Jan 2024 08:08        Urinalysis Basic - ( 11 Jan 2024 08:08 )    Color: x / Appearance: x / SG: x / pH: x  Gluc: 87 mg/dL / Ketone: x  / Bili: x / Urobili: x   Blood: x / Protein: x / Nitrite: x   Leuk Esterase: x / RBC: x / WBC x   Sq Epi: x / Non Sq Epi: x / Bacteria: x        Microbiology: reviewed    Culture - Blood (collected 01-06-24 @ 17:00)  Source: .Blood Blood-Peripheral  Preliminary Report (01-11-24 @ 02:00):    No growth at 4 days    Culture - Blood (collected 01-06-24 @ 16:45)  Source: .Blood Blood-Peripheral  Preliminary Report (01-11-24 @ 02:00):    No growth at 4 days    Culture - Blood (collected 01-06-24 @ 05:54)  Source: .Blood Blood-Venous  Preliminary Report (01-10-24 @ 13:01):    No growth at 4 days    Culture - Urine (collected 01-05-24 @ 19:40)  Source: Catheterized Catheterized  Final Report (01-07-24 @ 11:02):    >=3 organisms. Probable collection contamination.    Culture - Blood (collected 01-05-24 @ 19:20)  Source: .Blood Blood-Peripheral  Gram Stain (01-06-24 @ 12:41):    Growth in aerobic bottle: Gram Negative Rods  Final Report (01-08-24 @ 07:33):    Growth in aerobic bottle: Escherichia coli    Direct identification is available within approximately 3-5    hours either by Blood Panel Multiplexed PCR or Direct    MALDI-TOF. Details: [email or web address]  Organism: Blood Culture PCR  Escherichia coli (01-08-24 @ 07:33)  Organism: Escherichia coli (01-08-24 @ 07:33)      -  Levofloxacin: S <=0.5      -  Tobramycin: S <=2      -  Aztreonam: S <=4      -  Gentamicin: S <=2      -  Cefazolin: S <=2      -  Cefepime: S <=2      -  Piperacillin/Tazobactam: S <=8      -  Ciprofloxacin: S <=0.25      -  Imipenem: S <=1      -  Ceftriaxone: S <=1      -  Ampicillin: S <=8 These ampicillin results predict results for amoxicillin      Method Type: JUANITO      -  Meropenem: S <=1      -  Ampicillin/Sulbactam: S <=4/2      -  Cefoxitin: S <=8      -  Trimethoprim/Sulfamethoxazole: S <=0.5/9.5      -  Ertapenem: S <=0.5  Organism: Blood Culture PCR (01-08-24 @ 07:33)      -  Escherichia coli: Detec      Method Type: PCR          Radiology: reviewed

## 2024-01-11 NOTE — PROGRESS NOTE ADULT - SUBJECTIVE AND OBJECTIVE BOX
Patient is a 89y old  Male who presents with a chief complaint of urinary retention (11 Jan 2024 12:54)      INTERVAL HPI/OVERNIGHT EVENTS: noted  pt seen and examined this am   events noted  feels well      Vital Signs Last 24 Hrs  T(C): 36.8 (11 Jan 2024 13:07), Max: 37.3 (11 Jan 2024 05:26)  T(F): 98.2 (11 Jan 2024 13:07), Max: 99.1 (11 Jan 2024 05:26)  HR: 84 (11 Jan 2024 13:07) (60 - 84)  BP: 157/58 (11 Jan 2024 13:07) (140/68 - 175/62)  BP(mean): --  RR: 18 (11 Jan 2024 13:07) (18 - 18)  SpO2: 94% (11 Jan 2024 13:07) (92% - 96%)    Parameters below as of 11 Jan 2024 13:07  Patient On (Oxygen Delivery Method): room air        acetaminophen     Tablet .. 650 milliGRAM(s) Oral every 6 hours PRN  aluminum hydroxide/magnesium hydroxide/simethicone Suspension 30 milliLiter(s) Oral every 4 hours PRN  amLODIPine   Tablet 10 milliGRAM(s) Oral daily  cefTRIAXone   IVPB 2000 milliGRAM(s) IV Intermittent every 24 hours  finasteride 5 milliGRAM(s) Oral daily  losartan 100 milliGRAM(s) Oral daily  melatonin 3 milliGRAM(s) Oral at bedtime PRN  ondansetron Injectable 4 milliGRAM(s) IV Push every 8 hours PRN  warfarin 7.5 milliGRAM(s) Oral once      PHYSICAL EXAM:  GENERAL: NAD,   EYES: conjunctiva and sclera clear  ENMT: Moist mucous membranes  NECK: Supple, No JVD, Normal thyroid  CHEST/LUNG: non labored, cta b/l  HEART: Regular rate and rhythm; No murmurs, rubs, or gallops  ABDOMEN: Soft, Nontender, Nondistended; Bowel sounds present  EXTREMITIES:  2+ Peripheral Pulses, No clubbing, cyanosis, or edema  LYMPH: No lymphadenopathy noted  SKIN: No rashes or lesions    Consultant(s) Notes Reviewed:  [x ] YES  [ ] NO  Care Discussed with Consultants/Other Providers [ x] YES  [ ] NO    LABS:                        9.9    8.99  )-----------( 366      ( 11 Jan 2024 08:08 )             31.4     01-11    138  |  108  |  16  ----------------------------<  87  4.0   |  26  |  0.59    Ca    8.0<L>      11 Jan 2024 08:08      PT/INR - ( 11 Jan 2024 08:08 )   PT: 15.8 sec;   INR: 1.36 ratio           Urinalysis Basic - ( 11 Jan 2024 08:08 )    Color: x / Appearance: x / SG: x / pH: x  Gluc: 87 mg/dL / Ketone: x  / Bili: x / Urobili: x   Blood: x / Protein: x / Nitrite: x   Leuk Esterase: x / RBC: x / WBC x   Sq Epi: x / Non Sq Epi: x / Bacteria: x      CAPILLARY BLOOD GLUCOSE            Urinalysis Basic - ( 11 Jan 2024 08:08 )    Color: x / Appearance: x / SG: x / pH: x  Gluc: 87 mg/dL / Ketone: x  / Bili: x / Urobili: x   Blood: x / Protein: x / Nitrite: x   Leuk Esterase: x / RBC: x / WBC x   Sq Epi: x / Non Sq Epi: x / Bacteria: x          RADIOLOGY & ADDITIONAL TESTS:    Imaging Personally Reviewed:  [x ] YES  [ ] NO

## 2024-01-11 NOTE — CHART NOTE - NSCHARTNOTEFT_GEN_A_CORE
Assessment: Pt seen for nutrition follow-up. Chart reviewed, hospital course noted.    Brief hx: Pt is a "89 yo M with AF on coumadin, HTN, BPH with chronic palomino, p/w urinary retention, sepsis due to gram negative bacteremia and uti."    Visited pt this morning. Pt sitting in chair during visit. Reports fair intake of breakfast meal this am. PO intakes % per nursing documentation. Tolerating diet well. Denies chewing/swallowing difficulties. Denies N/V/D/C. Last BM 1/11. Pt continues on DASH/TLC diet. Will provide ensure plus HP BID for additional kcal/protein, pt agreeable. Recommend continued assistance/encouragement at meal times as needed. RD remains available.     Factors impacting intake: [X] none [ ] nausea  [ ] vomiting [ ] diarrhea [ ] constipation  [ ]chewing problems [ ] swallowing issues  [ ] other:     Diet Prescription: Diet, Regular:   DASH/TLC {Sodium & Cholesterol Restricted} (01-05-24 @ 21:08)    Intake: good    Current Weight: Weight (kg): 58.4 (01-10 @ 05:32), no new weight to assess  % Weight Change    Pertinent Medications: MEDICATIONS  (STANDING):  amLODIPine   Tablet 10 milliGRAM(s) Oral daily  cefTRIAXone   IVPB 2000 milliGRAM(s) IV Intermittent every 24 hours  finasteride 5 milliGRAM(s) Oral daily  losartan 100 milliGRAM(s) Oral daily    MEDICATIONS  (PRN):  acetaminophen     Tablet .. 650 milliGRAM(s) Oral every 6 hours PRN Temp greater or equal to 38C (100.4F), Mild Pain (1 - 3)  aluminum hydroxide/magnesium hydroxide/simethicone Suspension 30 milliLiter(s) Oral every 4 hours PRN Dyspepsia  melatonin 3 milliGRAM(s) Oral at bedtime PRN Insomnia  ondansetron Injectable 4 milliGRAM(s) IV Push every 8 hours PRN Nausea and/or Vomiting    Pertinent Labs: 01-11 Na138 mmol/L Glu 87 mg/dL K+ 4.0 mmol/L Cr  0.59 mg/dL BUN 16 mg/dL 01-05 Alb 3.1 g/dL<L>    Skin: DTI sacrum, stage I to BL heels    Estimated Needs:   [X] no change since previous assessment: based on #/63.5kg  25-30kcal/kg (1587-1905kcal)  1.2-1.4g pro/kg (76-89gm protein)  [ ] recalculated:     Previous Nutrition Diagnosis:   [X] Increased Nutrient Needs  [X] Malnutrition (moderate, chronic)    Nutrition Diagnosis is [X] ongoing  [ ] resolved [ ] not applicable     New Nutrition Diagnosis: [X] not applicable     Interventions:   Recommend  [ ] Change Diet To:  [ ] Nutrition Supplement  [X] Nutrition Support: ensure plus HP BID (350kcal, 20gm protein per 8 fl oz serving); MD made aware diet rx pending verification  [X] Other: Continue current diet as tolerated; honor food preferences as able to optimize po intake/tolerance  Recommend MVI daily and Vit C 500mg BID    Monitoring and Evaluation:   [X] PO intake [ x ] Tolerance to diet prescription [ x ] weights [ x ] labs[ x ] follow up per protocol  [X] other: s/s GI distress, bowel function, skin integrity/ edema

## 2024-01-11 NOTE — PROGRESS NOTE ADULT - ASSESSMENT
89 y/o M with PMH of Afib on Coumadin,  HTN, macular degeneration, BPH c/b urinary retention presented to ED to be evaluated for urinary retention as palomino was not draining    E Coli bacteremia  blood cx from 1/5 PCR positive for E Coli susceptibilities noted  1/6 BCx NGTD  urine culture contaminated but still suspect 2/2  source 2/2 urinary retention  catheter placed by urology on admission    Recommendations  c/w ceftriaxone 2g daily  Discussed midline with daughter. Pt living alone w/ wife also >90 and would have difficulty administering Abx; pt does not want to go to rehab  At this time midline cancelled. Would plan to give x7 days IV Abx until 1/12 and then switch to high dose amoxicillin 1000mg TID to complete x14 day course until 1/19/24  trend temps/WBC  additional care per primary team    D/w Dr. Guajardo    Infectious Diseases will continue to follow. Please call with any questions.   Denise Sarabia M.D.  [facility] Division of Infectious Diseases [phone]  For after 5 P.M. and weekends, please call [phone]       89 y/o M with PMH of Afib on Coumadin,  HTN, macular degeneration, BPH c/b urinary retention presented to ED to be evaluated for urinary retention as palomino was not draining    E Coli bacteremia  blood cx from 1/5 PCR positive for E Coli susceptibilities noted  1/6 BCx NGTD  urine culture contaminated but still suspect 2/2  source 2/2 urinary retention  catheter placed by urology on admission    Recommendations  c/w ceftriaxone 2g daily  Discussed midline with daughter. Pt living alone w/ wife also >90 and would have difficulty administering Abx; pt does not want to go to rehab  At this time midline cancelled. Would plan to give x7 days IV Abx until 1/12 and then switch to high dose amoxicillin 1000mg TID to complete x14 day course until 1/19/24  trend temps/WBC  additional care per primary team    D/w Dr. Guajardo    Infectious Diseases will continue to follow. Please call with any questions.   Denise Sarabia M.D.  [facility] Division of Infectious Diseases [phone]  For after 5 P.M. and weekends, please call [phone]

## 2024-01-12 ENCOUNTER — TRANSCRIPTION ENCOUNTER (OUTPATIENT)
Age: 89
End: 2024-01-12

## 2024-01-12 LAB
ANION GAP SERPL CALC-SCNC: -1 MMOL/L — LOW (ref 5–17)
ANION GAP SERPL CALC-SCNC: -1 MMOL/L — LOW (ref 5–17)
BUN SERPL-MCNC: 16 MG/DL — SIGNIFICANT CHANGE UP (ref 7–23)
BUN SERPL-MCNC: 16 MG/DL — SIGNIFICANT CHANGE UP (ref 7–23)
CALCIUM SERPL-MCNC: 8.6 MG/DL — SIGNIFICANT CHANGE UP (ref 8.5–10.1)
CALCIUM SERPL-MCNC: 8.6 MG/DL — SIGNIFICANT CHANGE UP (ref 8.5–10.1)
CHLORIDE SERPL-SCNC: 108 MMOL/L — SIGNIFICANT CHANGE UP (ref 96–108)
CHLORIDE SERPL-SCNC: 108 MMOL/L — SIGNIFICANT CHANGE UP (ref 96–108)
CO2 SERPL-SCNC: 30 MMOL/L — SIGNIFICANT CHANGE UP (ref 22–31)
CO2 SERPL-SCNC: 30 MMOL/L — SIGNIFICANT CHANGE UP (ref 22–31)
CREAT SERPL-MCNC: 0.64 MG/DL — SIGNIFICANT CHANGE UP (ref 0.5–1.3)
CREAT SERPL-MCNC: 0.64 MG/DL — SIGNIFICANT CHANGE UP (ref 0.5–1.3)
CULTURE RESULTS: SIGNIFICANT CHANGE UP
EGFR: 88 ML/MIN/1.73M2 — SIGNIFICANT CHANGE UP
EGFR: 88 ML/MIN/1.73M2 — SIGNIFICANT CHANGE UP
GLUCOSE SERPL-MCNC: 89 MG/DL — SIGNIFICANT CHANGE UP (ref 70–99)
GLUCOSE SERPL-MCNC: 89 MG/DL — SIGNIFICANT CHANGE UP (ref 70–99)
HCT VFR BLD CALC: 33.2 % — LOW (ref 39–50)
HCT VFR BLD CALC: 33.2 % — LOW (ref 39–50)
HGB BLD-MCNC: 10.6 G/DL — LOW (ref 13–17)
HGB BLD-MCNC: 10.6 G/DL — LOW (ref 13–17)
INR BLD: 1.42 RATIO — HIGH (ref 0.85–1.18)
INR BLD: 1.42 RATIO — HIGH (ref 0.85–1.18)
MCHC RBC-ENTMCNC: 30.5 PG — SIGNIFICANT CHANGE UP (ref 27–34)
MCHC RBC-ENTMCNC: 30.5 PG — SIGNIFICANT CHANGE UP (ref 27–34)
MCHC RBC-ENTMCNC: 31.9 GM/DL — LOW (ref 32–36)
MCHC RBC-ENTMCNC: 31.9 GM/DL — LOW (ref 32–36)
MCV RBC AUTO: 95.4 FL — SIGNIFICANT CHANGE UP (ref 80–100)
MCV RBC AUTO: 95.4 FL — SIGNIFICANT CHANGE UP (ref 80–100)
NRBC # BLD: 0 /100 WBCS — SIGNIFICANT CHANGE UP (ref 0–0)
NRBC # BLD: 0 /100 WBCS — SIGNIFICANT CHANGE UP (ref 0–0)
PLATELET # BLD AUTO: 408 K/UL — HIGH (ref 150–400)
PLATELET # BLD AUTO: 408 K/UL — HIGH (ref 150–400)
POTASSIUM SERPL-MCNC: 4.1 MMOL/L — SIGNIFICANT CHANGE UP (ref 3.5–5.3)
POTASSIUM SERPL-MCNC: 4.1 MMOL/L — SIGNIFICANT CHANGE UP (ref 3.5–5.3)
POTASSIUM SERPL-SCNC: 4.1 MMOL/L — SIGNIFICANT CHANGE UP (ref 3.5–5.3)
POTASSIUM SERPL-SCNC: 4.1 MMOL/L — SIGNIFICANT CHANGE UP (ref 3.5–5.3)
PROTHROM AB SERPL-ACNC: 16.5 SEC — HIGH (ref 9.5–13)
PROTHROM AB SERPL-ACNC: 16.5 SEC — HIGH (ref 9.5–13)
RBC # BLD: 3.48 M/UL — LOW (ref 4.2–5.8)
RBC # BLD: 3.48 M/UL — LOW (ref 4.2–5.8)
RBC # FLD: 15.7 % — HIGH (ref 10.3–14.5)
RBC # FLD: 15.7 % — HIGH (ref 10.3–14.5)
SODIUM SERPL-SCNC: 137 MMOL/L — SIGNIFICANT CHANGE UP (ref 135–145)
SODIUM SERPL-SCNC: 137 MMOL/L — SIGNIFICANT CHANGE UP (ref 135–145)
SPECIMEN SOURCE: SIGNIFICANT CHANGE UP
WBC # BLD: 10.18 K/UL — SIGNIFICANT CHANGE UP (ref 3.8–10.5)
WBC # BLD: 10.18 K/UL — SIGNIFICANT CHANGE UP (ref 3.8–10.5)
WBC # FLD AUTO: 10.18 K/UL — SIGNIFICANT CHANGE UP (ref 3.8–10.5)
WBC # FLD AUTO: 10.18 K/UL — SIGNIFICANT CHANGE UP (ref 3.8–10.5)

## 2024-01-12 RX ORDER — BRINZOLAMIDE 10 MG/ML
1 SUSPENSION/ DROPS OPHTHALMIC
Refills: 0 | DISCHARGE

## 2024-01-12 RX ORDER — WARFARIN SODIUM 2.5 MG/1
1 TABLET ORAL
Qty: 5 | Refills: 0
Start: 2024-01-12 | End: 2024-01-16

## 2024-01-12 RX ORDER — WARFARIN SODIUM 2.5 MG/1
6 TABLET ORAL ONCE
Refills: 0 | Status: COMPLETED | OUTPATIENT
Start: 2024-01-12 | End: 2024-01-12

## 2024-01-12 RX ADMIN — LOSARTAN POTASSIUM 100 MILLIGRAM(S): 100 TABLET, FILM COATED ORAL at 05:26

## 2024-01-12 RX ADMIN — CEFTRIAXONE 100 MILLIGRAM(S): 500 INJECTION, POWDER, FOR SOLUTION INTRAMUSCULAR; INTRAVENOUS at 05:27

## 2024-01-12 RX ADMIN — WARFARIN SODIUM 6 MILLIGRAM(S): 2.5 TABLET ORAL at 18:38

## 2024-01-12 RX ADMIN — AMLODIPINE BESYLATE 10 MILLIGRAM(S): 2.5 TABLET ORAL at 05:26

## 2024-01-12 RX ADMIN — FINASTERIDE 5 MILLIGRAM(S): 5 TABLET, FILM COATED ORAL at 13:45

## 2024-01-12 NOTE — DISCHARGE NOTE PROVIDER - DETAILS OF MALNUTRITION DIAGNOSIS/DIAGNOSES
This patient has been assessed with a concern for Malnutrition and was treated during this hospitalization for the following Nutrition diagnosis/diagnoses:     -  01/08/2024: Moderate protein-calorie malnutrition

## 2024-01-12 NOTE — DISCHARGE NOTE NURSING/CASE MANAGEMENT/SOCIAL WORK - NSDCPEFALRISK_GEN_ALL_CORE
For information on Fall & Injury Prevention, visit: [email or web address]/[email or web address] OR  [email or web address]/[email or web address] OR  https://www.cdc.gov/steadi/patient.html For information on Fall & Injury Prevention, visit: [email or web address]://[email or web address]/[email or web address]-[email or web address]-mobility OR  [email or web address]/[email or web address]-[email or web address]-injury OR  https://www.cdc.gov/steadi/patient.html

## 2024-01-12 NOTE — SOCIAL WORK PROGRESS NOTE - NSSWPROGRESSNOTE_GEN_ALL_CORE
Pt is scheduled for 11AM  with ambuln Ambulance.  Pt was supposed to go home today but [facility] could not come until 11pm.  pt aides and family will be there tomorrow and are aware of change.   SW called [facility] # [phone] to change date and time, ref# [id number], spoke to Felix.  Pt has [employer].  Pt is scheduled for 11AM  with ambuln Ambulance.  Pt was supposed to go home today but [facility] could not come until 11pm.  pt aides and family will be there tomorrow and are aware of change.   SW called [facility] # [phone] to change date and time, ref# [id number], spoke to Felix.  Pt has [employer].

## 2024-01-12 NOTE — PROGRESS NOTE ADULT - ASSESSMENT
89 y/o M with PMH of Afib on Coumadin,  HTN, macular degeneration, BPH c/b urinary retention presented to ED to be evaluated for urinary retention as palomino was not draining    E Coli bacteremia  blood cx from 1/5 PCR positive for E Coli susceptibilities noted  1/6 BCx NGTD  urine culture contaminated but still suspect 2/2  source 2/2 urinary retention  catheter placed by urology on admission    Recommendations  c/w ceftriaxone 2g daily, last dose today 1/12  Can d/c home on high dose amoxicillin 1000mg TID to complete x14 day course until 1/19/24  trend temps/WBC  additional care per primary team    Stable from ID standpoint  D/c planning per primary team    Infectious Diseases will continue to follow. Please call with any questions.   Denise Sarabia M.D.  [facility] Division of Infectious Diseases [phone]  For after 5 P.M. and weekends, please call [phone]       89 y/o M with PMH of Afib on Coumadin,  HTN, macular degeneration, BPH c/b urinary retention presented to ED to be evaluated for urinary retention as palomino was not draining    E Coli bacteremia  blood cx from 1/5 PCR positive for E Coli susceptibilities noted  1/6 BCx NGTD  urine culture contaminated but still suspect 2/2  source 2/2 urinary retention  catheter placed by urology on admission    Recommendations  c/w ceftriaxone 2g daily, last dose today 1/12  Can d/c home on high dose amoxicillin 1000mg TID to complete x14 day course until 1/19/24  trend temps/WBC  additional care per primary team    Stable from ID standpoint  D/c planning per primary team    Infectious Diseases will continue to follow. Please call with any questions.   Denise Sarabia M.D.  [facility] Division of Infectious Diseases [phone]  For after 5 P.M. and weekends, please call [phone]

## 2024-01-12 NOTE — DISCHARGE NOTE PROVIDER - HOSPITAL COURSE
89m with AF on coumadin, HTN, BPH with chronic palomino, p/w urinary retention,   sepsis due to Ecoli bacteremia and uti  palomino replaced  completed abx course   89m with AF on coumadin, HTN, BPH with chronic palomino, p/w urinary retention,   sepsis due to Ecoli bacteremia and uti  palomino replaced  completed abx course  coumadin for afib was held dt high INR , upon dc INR 1.4  will dc pt on coumadin 6mg qhs, check inr in 2-3 days- cards fu in re: coumadin dosing

## 2024-01-12 NOTE — DISCHARGE NOTE NURSING/CASE MANAGEMENT/SOCIAL WORK - PATIENT PORTAL LINK FT
You can access the FollowMyHealth Patient Portal offered by [facility] by registering at the following website: [email or web address]. By joining [employer]’s FollowMyHealth portal, you will also be able to view your health information using other applications (apps) compatible with our system. You can access the FollowMyHealth Patient Portal offered by [facility] by registering at the following website: [email or web address]. By joining [employer]’s FollowMyHealth portal, you will also be able to view your health information using other applications (apps) compatible with our system.

## 2024-01-12 NOTE — SOCIAL WORK PROGRESS NOTE - NSSWPROGRESSNOTE_GEN_ALL_CORE
ASHU spoke with Ricardo at [facility] [employer] [phone] who confirmed transport for this evening and they are on their way. ASHU remains available.  ASHU spoke with Ricardo at [facility] [phone] who confirmed transport for this evening and they are on their way. ASHU remains available.

## 2024-01-12 NOTE — DISCHARGE NOTE PROVIDER - NSDCMRMEDTOKEN_GEN_ALL_CORE_FT
acetaminophen 325 mg oral tablet: 2 tab(s) orally every 6 hours As needed Temp greater or equal to 38.5C (101.3F), Moderate Pain (4 - 6)  finasteride 5 mg oral tablet: 1 tab(s) orally once a day  latanoprost 0.005% ophthalmic solution: 1 drop(s) in each eye once a day  losartan 100 mg oral tablet: 1 tab(s) orally once a day  Multiple Vitamins with Minerals oral tablet: 1 tab(s) orally once a day  Norvasc 10 mg oral tablet: 1 tab(s) orally once a day  tamsulosin 0.4 mg oral capsule: 2 cap(s) orally once a day (at bedtime)  warfarin 6 mg oral tablet: 1 tab(s) orally once a day   acetaminophen 325 mg oral tablet: 2 tab(s) orally every 6 hours As needed Temp greater or equal to 38.5C (101.3F), Moderate Pain (4 - 6)  amoxicillin-clavulanate 1000 mg-62.5 mg oral tablet, extended release: 2 tab(s) orally 3 times a day  finasteride 5 mg oral tablet: 1 tab(s) orally once a day  latanoprost 0.005% ophthalmic solution: 1 drop(s) in each eye once a day  losartan 100 mg oral tablet: 1 tab(s) orally once a day  Multiple Vitamins with Minerals oral tablet: 1 tab(s) orally once a day  Norvasc 10 mg oral tablet: 1 tab(s) orally once a day  tamsulosin 0.4 mg oral capsule: 2 cap(s) orally once a day (at bedtime)  warfarin 6 mg oral tablet: 1 tab(s) orally once a day   acetaminophen 325 mg oral tablet: 2 tab(s) orally every 6 hours As needed Temp greater or equal to 38.5C (101.3F), Moderate Pain (4 - 6)  amoxicillin-clavulanate 1000 mg-62.5 mg oral tablet, extended release: 1 tab(s) orally 3 times a day  finasteride 5 mg oral tablet: 1 tab(s) orally once a day  latanoprost 0.005% ophthalmic solution: 1 drop(s) in each eye once a day  losartan 100 mg oral tablet: 1 tab(s) orally once a day  Multiple Vitamins with Minerals oral tablet: 1 tab(s) orally once a day  Norvasc 10 mg oral tablet: 1 tab(s) orally once a day  tamsulosin 0.4 mg oral capsule: 2 cap(s) orally once a day (at bedtime)  warfarin 6 mg oral tablet: 1 tab(s) orally once a day

## 2024-01-12 NOTE — PROGRESS NOTE ADULT - SUBJECTIVE AND OBJECTIVE BOX
Daily Note     Today's date: 2021  Patient name: Matilde Oakley  : 2017  MRN: [id number]  Referring provider: Jasiel Rojas MD  Dx:   Encounter Diagnosis     ICD-10-CM    1  Development delay  R62 50    2  Lack of expected normal physiological development  R62 50          Subjective: Rolando Bonner arrived to the session accompanied by his Mother  No new significant caregiver reports at this time  Passed temperature check today with a denial of all COVID symptoms  Wes was able to wear his mask, but pulled it off at times, therapist wore Tammie Earthly during the session  Partial co treat with speech       Objective/Assessment:   3-step gross motor obstacle with non interlocking puzzle   1  Slide:Able to climb independently with mod verbal cues to sit for sliding down  2  Jumping noisy discs:Able to jump with 2 feet with 80% accuracy on 26 noisy discs with min assist For control  3   Scooter: able to sit and walk with feet forward a distance of up to 6 feet on carpet surface to place puzzle pieces on board     Stringing beads: completed for bilateral skills, intrinsic strengthening and manual dexterity, able to complete with min assist on 25% given opportunities on 5:5 attempts    VM Worksheet: Able to complete following directions worksheet with coloring images, utilized large marker in the left hand with 25% accuracy to color within space, tend to scribble with gross motor movement rather than demonstrating distal control    Adaptive tricycle: completed for motor planning, coordination, sensory regulation/organization using adaptive bike, required max assist for steering for safety awareness and for upper extremity control out on community sidewalks    Coloring:Completed for visual motor skills, attention to task, completing task and transition without frustrations, Rolando Bonner required mod verbal prompts to color for duration of up to at least 20 to 25 seconds, poor ability staying within boundary lines and mod v/c for attention with tasks     Wes would continue to benefit from skilled occupational therapy services with focus on sensory processing abilities, FM skills, visual-perceptual-motor skills, and developmentally appropriate play skills       Plan: Continue per plan of care [facility], Division of Infectious Diseases  JIAN Carrizales Y. Patel, S. Shah, [facility]  [phone]    Name: GURINDER TURNER  Age: 89y  Gender: Male  MRN: [id number]    Interval History:  Patient seen and examined at bedside  No acute overnight events. Afebrile  Notes reviewed    Antibiotics:  cefTRIAXone   IVPB 2000 milliGRAM(s) IV Intermittent every 24 hours      Medications:  acetaminophen     Tablet .. 650 milliGRAM(s) Oral every 6 hours PRN  aluminum hydroxide/magnesium hydroxide/simethicone Suspension 30 milliLiter(s) Oral every 4 hours PRN  amLODIPine   Tablet 10 milliGRAM(s) Oral daily  cefTRIAXone   IVPB 2000 milliGRAM(s) IV Intermittent every 24 hours  finasteride 5 milliGRAM(s) Oral daily  losartan 100 milliGRAM(s) Oral daily  melatonin 3 milliGRAM(s) Oral at bedtime PRN  ondansetron Injectable 4 milliGRAM(s) IV Push every 8 hours PRN      Review of Systems:  Review of systems otherwise negative except as previously noted.    Allergies: No Known Allergies    For details regarding the patient's past medical history, social history, family history, and other miscellaneous elements, please refer the initial infectious diseases consultation and/or the admitting history and physical examination for this admission.    Objective:  Vitals:   T(C): 37.1 (01-12-24 @ 05:43), Max: 37.1 (01-12-24 @ 05:43)  HR: 57 (01-12-24 @ 05:43) (57 - 84)  BP: 180/66 (01-12-24 @ 05:43) (157/58 - 180/66)  RR: 18 (01-12-24 @ 05:43) (18 - 18)  SpO2: 95% (01-12-24 @ 05:43) (94% - 95%)    Physical Examination:  General: no acute distress  HEENT: NC/AT, EOMI,   Cardio: S1, S2 heard, RRR, no murmurs  Resp: breath sounds heard bilaterally, no rales, wheezes or rhonchi  Abd: soft, NT, ND  Ext: no edema or cyanosis  Skin: warm, dry, no visible rash      Laboratory Studies:  CBC:                       10.6   10.18 )-----------( 408      ( 12 Jan 2024 07:40 )             33.2     CMP: 01-12    137  |  108  |  16  ----------------------------<  89  4.1   |  30  |  0.64    Ca    8.6      12 Jan 2024 07:40        Urinalysis Basic - ( 12 Jan 2024 07:40 )    Color: x / Appearance: x / SG: x / pH: x  Gluc: 89 mg/dL / Ketone: x  / Bili: x / Urobili: x   Blood: x / Protein: x / Nitrite: x   Leuk Esterase: x / RBC: x / WBC x   Sq Epi: x / Non Sq Epi: x / Bacteria: x        Microbiology: reviewed    Culture - Blood (collected 01-06-24 @ 17:00)  Source: .Blood Blood-Peripheral  Final Report (01-12-24 @ 02:01):    No growth at 5 days    Culture - Blood (collected 01-06-24 @ 16:45)  Source: .Blood Blood-Peripheral  Final Report (01-12-24 @ 02:01):    No growth at 5 days    Culture - Blood (collected 01-06-24 @ 05:54)  Source: .Blood Blood-Venous  Final Report (01-11-24 @ 14:00):    No growth at 5 days    Culture - Urine (collected 01-05-24 @ 19:40)  Source: Catheterized Catheterized  Final Report (01-07-24 @ 11:02):    >=3 organisms. Probable collection contamination.    Culture - Blood (collected 01-05-24 @ 19:20)  Source: .Blood Blood-Peripheral  Gram Stain (01-06-24 @ 12:41):    Growth in aerobic bottle: Gram Negative Rods  Final Report (01-08-24 @ 07:33):    Growth in aerobic bottle: Escherichia coli    Direct identification is available within approximately 3-5    hours either by Blood Panel Multiplexed PCR or Direct    MALDI-TOF. Details: [email or web address]  Organism: Blood Culture PCR  Escherichia coli (01-08-24 @ 07:33)  Organism: Escherichia coli (01-08-24 @ 07:33)      -  Levofloxacin: S <=0.5      -  Tobramycin: S <=2      -  Aztreonam: S <=4      -  Gentamicin: S <=2      -  Cefazolin: S <=2      -  Cefepime: S <=2      -  Piperacillin/Tazobactam: S <=8      -  Ciprofloxacin: S <=0.25      -  Imipenem: S <=1      -  Ceftriaxone: S <=1      -  Ampicillin: S <=8 These ampicillin results predict results for amoxicillin      Method Type: JUANITO      -  Meropenem: S <=1      -  Ampicillin/Sulbactam: S <=4/2      -  Cefoxitin: S <=8      -  Trimethoprim/Sulfamethoxazole: S <=0.5/9.5      -  Ertapenem: S <=0.5  Organism: Blood Culture PCR (01-08-24 @ 07:33)      -  Escherichia coli: Detec      Method Type: PCR          Radiology: reviewed       [facility], Division of Infectious Diseases  JIAN Carrizales Y. Patel, S. Shah, [facility]. [facility]  [phone]    Name: GURINDER TURNER  Age: 89y  Gender: Male  MRN: [id number]    Interval History:  Patient seen and examined at bedside  No acute overnight events. Afebrile  Notes reviewed    Antibiotics:  cefTRIAXone   IVPB 2000 milliGRAM(s) IV Intermittent every 24 hours      Medications:  acetaminophen     Tablet .. 650 milliGRAM(s) Oral every 6 hours PRN  aluminum hydroxide/magnesium hydroxide/simethicone Suspension 30 milliLiter(s) Oral every 4 hours PRN  amLODIPine   Tablet 10 milliGRAM(s) Oral daily  cefTRIAXone   IVPB 2000 milliGRAM(s) IV Intermittent every 24 hours  finasteride 5 milliGRAM(s) Oral daily  losartan 100 milliGRAM(s) Oral daily  melatonin 3 milliGRAM(s) Oral at bedtime PRN  ondansetron Injectable 4 milliGRAM(s) IV Push every 8 hours PRN      Review of Systems:  Review of systems otherwise negative except as previously noted.    Allergies: No Known Allergies    For details regarding the patient's past medical history, social history, family history, and other miscellaneous elements, please refer the initial infectious diseases consultation and/or the admitting history and physical examination for this admission.    Objective:  Vitals:   T(C): 37.1 (01-12-24 @ 05:43), Max: 37.1 (01-12-24 @ 05:43)  HR: 57 (01-12-24 @ 05:43) (57 - 84)  BP: 180/66 (01-12-24 @ 05:43) (157/58 - 180/66)  RR: 18 (01-12-24 @ 05:43) (18 - 18)  SpO2: 95% (01-12-24 @ 05:43) (94% - 95%)    Physical Examination:  General: no acute distress  HEENT: NC/AT, EOMI,   Cardio: S1, S2 heard, RRR, no murmurs  Resp: breath sounds heard bilaterally, no rales, wheezes or rhonchi  Abd: soft, NT, ND  Ext: no edema or cyanosis  Skin: warm, dry, no visible rash      Laboratory Studies:  CBC:                       10.6   10.18 )-----------( 408      ( 12 Jan 2024 07:40 )             33.2     CMP: 01-12    137  |  108  |  16  ----------------------------<  89  4.1   |  30  |  0.64    Ca    8.6      12 Jan 2024 07:40        Urinalysis Basic - ( 12 Jan 2024 07:40 )    Color: x / Appearance: x / SG: x / pH: x  Gluc: 89 mg/dL / Ketone: x  / Bili: x / Urobili: x   Blood: x / Protein: x / Nitrite: x   Leuk Esterase: x / RBC: x / WBC x   Sq Epi: x / Non Sq Epi: x / Bacteria: x        Microbiology: reviewed    Culture - Blood (collected 01-06-24 @ 17:00)  Source: .Blood Blood-Peripheral  Final Report (01-12-24 @ 02:01):    No growth at 5 days    Culture - Blood (collected 01-06-24 @ 16:45)  Source: .Blood Blood-Peripheral  Final Report (01-12-24 @ 02:01):    No growth at 5 days    Culture - Blood (collected 01-06-24 @ 05:54)  Source: .Blood Blood-Venous  Final Report (01-11-24 @ 14:00):    No growth at 5 days    Culture - Urine (collected 01-05-24 @ 19:40)  Source: Catheterized Catheterized  Final Report (01-07-24 @ 11:02):    >=3 organisms. Probable collection contamination.    Culture - Blood (collected 01-05-24 @ 19:20)  Source: .Blood Blood-Peripheral  Gram Stain (01-06-24 @ 12:41):    Growth in aerobic bottle: Gram Negative Rods  Final Report (01-08-24 @ 07:33):    Growth in aerobic bottle: Escherichia coli    Direct identification is available within approximately 3-5    hours either by Blood Panel Multiplexed PCR or Direct    MALDI-TOF. Details: [email or web address]  Organism: Blood Culture PCR  Escherichia coli (01-08-24 @ 07:33)  Organism: Escherichia coli (01-08-24 @ 07:33)      -  Levofloxacin: S <=0.5      -  Tobramycin: S <=2      -  Aztreonam: S <=4      -  Gentamicin: S <=2      -  Cefazolin: S <=2      -  Cefepime: S <=2      -  Piperacillin/Tazobactam: S <=8      -  Ciprofloxacin: S <=0.25      -  Imipenem: S <=1      -  Ceftriaxone: S <=1      -  Ampicillin: S <=8 These ampicillin results predict results for amoxicillin      Method Type: JUANITO      -  Meropenem: S <=1      -  Ampicillin/Sulbactam: S <=4/2      -  Cefoxitin: S <=8      -  Trimethoprim/Sulfamethoxazole: S <=0.5/9.5      -  Ertapenem: S <=0.5  Organism: Blood Culture PCR (01-08-24 @ 07:33)      -  Escherichia coli: Detec      Method Type: PCR          Radiology: reviewed

## 2024-01-12 NOTE — DISCHARGE NOTE PROVIDER - CARE PROVIDERS DIRECT ADDRESSES
[email or web address]

## 2024-01-12 NOTE — DISCHARGE NOTE PROVIDER - NSDCCPCAREPLAN_GEN_ALL_CORE_FT
PRINCIPAL DISCHARGE DIAGNOSIS  Diagnosis: Complicated UTI (urinary tract infection)  Assessment and Plan of Treatment: Youwere admitted with a urinary tract infection. You were seen by the infectious disease doctor who recommended you start on intravenous antibiotics. Your symptoms are improving. You are being discharged with Palomino. Please follow with the urologist for outpatient trial of void. Please follow with your primary medical doctor.      SECONDARY DISCHARGE DIAGNOSES  Diagnosis: Urinary retention  Assessment and Plan of Treatment: You were admitted with  urinary retention. You had the chorinc Palomino exchanged on admission. You were seen by the urologist who recommends oupatient follow up.   PALOMINO CARE:   Palomino Management Orders:  Please flush palomino with 60 ml NS or Sterile Water Q 6 hours PRN for hematuria, palomino malfunction, debris, or decreased output less than 30 ml/hour. Call urologist for continued management.    Diagnosis: Chronic atrial fibrillation  Assessment and Plan of Treatment: Your INR is 1.4 today. Please continue Coumadin dosing. Please take Coumandin 6 mg daily for the next 3 days (Friday, Saturday, Sunday). Please check your INR on Monday. Please follow with Dr. Kulkarni within one week of discharge.    Diagnosis: BPH (benign prostatic hyperplasia)  Assessment and Plan of Treatment: Continue Flomax and Finesteride.    Diagnosis: HTN (hypertension)  Assessment and Plan of Treatment: Continue current medical management with Amlodipine and Losartan.     PRINCIPAL DISCHARGE DIAGNOSIS  Diagnosis: Complicated UTI (urinary tract infection)  Assessment and Plan of Treatment: Youwere admitted with a urinary tract infection. You were seen by the infectious disease doctor who recommended you start on intravenous antibiotics. Your symptoms are improving. You are being discharged with Palomino. Please follow with the urologist for outpatient trial of void. Please follow with your primary medical doctor.      SECONDARY DISCHARGE DIAGNOSES  Diagnosis: Urinary retention  Assessment and Plan of Treatment: You were admitted with  urinary retention. You had the chorinc Palomino exchanged on admission. You were seen by the urologist who recommends oupatient follow up. Monthly Palomino exchange, follow with urology.   PALOMINO CARE:   Palomino Management Orders:  Please flush palomino with 60 ml NS or Sterile Water if clogged as needed. Call urologist for continued management.    Diagnosis: Chronic atrial fibrillation  Assessment and Plan of Treatment: Your INR is 1.4 today. Please continue Coumadin dosing. Please take Coumandin 6 mg daily for the next 3 days (Friday, Saturday, Sunday). Please check your INR on Monday. Please follow with Dr. Kulkarni within one week of discharge.    Diagnosis: BPH (benign prostatic hyperplasia)  Assessment and Plan of Treatment: Continue Flomax and Finesteride.    Diagnosis: HTN (hypertension)  Assessment and Plan of Treatment: Continue current medical management with Amlodipine and Losartan.

## 2024-01-12 NOTE — PROGRESS NOTE ADULT - REASON FOR ADMISSION
urinary retention

## 2024-01-12 NOTE — PROGRESS NOTE ADULT - PROVIDER SPECIALTY LIST ADULT
Hospitalist
Hospitalist
Infectious Disease
Infectious Disease
Urology
Urology
Hospitalist
Infectious Disease
Urology
Hospitalist
Infectious Disease
Internal Medicine
Internal Medicine

## 2024-01-12 NOTE — CASE MANAGEMENT PROGRESS NOTE - NSCMPROGRESSNOTE_GEN_ALL_CORE
Per MD, patient is medically cleared for discharge home today.  CM spoke to daughter to discussed discharge disposition home with [facility]. CM spoke to CM Gwendolyn at [facility] to resume 12hrs aides. DC documents faxed to [phone]. Ambulance authorization been obtained for transportation home.  Daughter verbalized understanding of the transition plan and is in agreement.  CM remains available throughout hospital stay.   Per MD, patient is medically cleared for discharge home today.  CM spoke to daughter to discussed discharge disposition home with [facility]. CM spoke to CM Gwendolyn at [facility] to resume 12hrs aides. DC documents faxed to [phone]. Ambulance authorization been obtained for transportation home.  Daughter verbalized understanding of the transition plan and is in agreement.  CM remains available throughout hospital stay.

## 2024-01-12 NOTE — DISCHARGE NOTE PROVIDER - CARE PROVIDER_API CALL
Maryjane Saldivar  Urology  [address] [place] [address], [address]  [place] [address]  Phone: [phone]  Fax: [phone]  Follow Up Time: 1 week   Maryjane Saldivar  Urology  [address] [place] [address], [address]  [place] [address]  Phone: [phone]  Fax: [phone]  Follow Up Time: 1 week   Maryjane Saldivar  Urology  [address] [place] [address], [address]  [place] [address]  Phone: [phone]  Fax: [phone]  Follow Up Time: 1 week   Maryjane Saldivar  Urology  [address] [place] [address], [address]  [place] [address]  Phone: [phone]  Fax: [phone]  Follow Up Time: 1 week

## 2024-01-12 NOTE — DISCHARGE NOTE PROVIDER - NSDCFUADDINST_GEN_ALL_CORE_FT
Supplement Feeding Modality:  Oral  Ensure Plus High Protein Cans or Servings Per Day:  1       Frequency:  Two Times a day

## 2024-01-12 NOTE — CASE MANAGEMENT PROGRESS NOTE - NSCMPROGRESSNOTE_GEN_ALL_CORE
KHUSHBOO spoke to KHUSHBOO Snow from [facility] [phone] who stated aide will be available at 2 pm today.  Need DC faxed to [phone].  KHUSHBOO called daughter to make aware. KHUSHBOO answered all questions to the best of my abilities. KHUSHBOO remains available throughout hospital stay.       KHUSHBOO spoke to KHUSHBOO Snow from [facility] [phone] who stated aide will be available at 2 pm today.  Need DC faxed to [phone].  KHUSHBOO called daughter to make aware. KHUSHBOO answered all questions to the best of my abilities. KHUSHBOO remains available throughout hospital stay.

## 2024-01-12 NOTE — CAREGIVER ENGAGEMENT NOTE - CAREGIVER OUTREACH NOTES - FREE TEXT
CM discussed discharge disposition and start of aide for 2 pm.  Daughter requesting HC with [facility].  As per daughter cancelation with [facility] will be done today. Dr pimentel office arrange home PT/INR.  She is calling the office to arrange for tomorrow as per MD request. KHUSHBOO answered all questions to the best of my abilities.   CM discussed discharge disposition and start of aide for 2 pm.  Daughter requesting HC with [facility].  As per daughter cancelation with [facility] will be done today. Dr pimentel office arrange home PT/INR.  She is calling the office to arrange for tomorrow as per MD request. KHUSHBOO answered all questions to the best of my abilities.   CM discussed discharge disposition and start of aide for 2 pm.  Daughter requesting HC with [facility].  As per daughter cancelation with [facility] was done today (as per daughter spoke to Shaista). Dr Euceda office arrange home PT/INR for Monday. KHUSHBOO answered all questions to the best of my abilities.   CM discussed discharge disposition and start of aide for 2 pm.  Daughter requesting HC with [facility].  As per daughter cancelation with [facility] was done today (as per daughter spoke to Shaista). Dr Euceda office arrange home PT/INR for Monday. KHUSHBOO answered all questions to the best of my abilities.

## 2024-01-13 VITALS
DIASTOLIC BLOOD PRESSURE: 56 MMHG | OXYGEN SATURATION: 96 % | HEART RATE: 50 BPM | SYSTOLIC BLOOD PRESSURE: 147 MMHG | TEMPERATURE: 98 F | RESPIRATION RATE: 16 BRPM

## 2024-01-13 LAB
ANION GAP SERPL CALC-SCNC: 1 MMOL/L — LOW (ref 5–17)
ANION GAP SERPL CALC-SCNC: 1 MMOL/L — LOW (ref 5–17)
BUN SERPL-MCNC: 15 MG/DL — SIGNIFICANT CHANGE UP (ref 7–23)
BUN SERPL-MCNC: 15 MG/DL — SIGNIFICANT CHANGE UP (ref 7–23)
CALCIUM SERPL-MCNC: 8.6 MG/DL — SIGNIFICANT CHANGE UP (ref 8.5–10.1)
CALCIUM SERPL-MCNC: 8.6 MG/DL — SIGNIFICANT CHANGE UP (ref 8.5–10.1)
CHLORIDE SERPL-SCNC: 108 MMOL/L — SIGNIFICANT CHANGE UP (ref 96–108)
CHLORIDE SERPL-SCNC: 108 MMOL/L — SIGNIFICANT CHANGE UP (ref 96–108)
CO2 SERPL-SCNC: 28 MMOL/L — SIGNIFICANT CHANGE UP (ref 22–31)
CO2 SERPL-SCNC: 28 MMOL/L — SIGNIFICANT CHANGE UP (ref 22–31)
CREAT SERPL-MCNC: 0.54 MG/DL — SIGNIFICANT CHANGE UP (ref 0.5–1.3)
CREAT SERPL-MCNC: 0.54 MG/DL — SIGNIFICANT CHANGE UP (ref 0.5–1.3)
EGFR: 93 ML/MIN/1.73M2 — SIGNIFICANT CHANGE UP
EGFR: 93 ML/MIN/1.73M2 — SIGNIFICANT CHANGE UP
GLUCOSE SERPL-MCNC: 87 MG/DL — SIGNIFICANT CHANGE UP (ref 70–99)
GLUCOSE SERPL-MCNC: 87 MG/DL — SIGNIFICANT CHANGE UP (ref 70–99)
HCT VFR BLD CALC: 30.9 % — LOW (ref 39–50)
HCT VFR BLD CALC: 30.9 % — LOW (ref 39–50)
HGB BLD-MCNC: 9.9 G/DL — LOW (ref 13–17)
HGB BLD-MCNC: 9.9 G/DL — LOW (ref 13–17)
INR BLD: 1.73 RATIO — HIGH (ref 0.85–1.18)
INR BLD: 1.73 RATIO — HIGH (ref 0.85–1.18)
MCHC RBC-ENTMCNC: 30.6 PG — SIGNIFICANT CHANGE UP (ref 27–34)
MCHC RBC-ENTMCNC: 30.6 PG — SIGNIFICANT CHANGE UP (ref 27–34)
MCHC RBC-ENTMCNC: 32 GM/DL — SIGNIFICANT CHANGE UP (ref 32–36)
MCHC RBC-ENTMCNC: 32 GM/DL — SIGNIFICANT CHANGE UP (ref 32–36)
MCV RBC AUTO: 95.4 FL — SIGNIFICANT CHANGE UP (ref 80–100)
MCV RBC AUTO: 95.4 FL — SIGNIFICANT CHANGE UP (ref 80–100)
NRBC # BLD: 0 /100 WBCS — SIGNIFICANT CHANGE UP (ref 0–0)
NRBC # BLD: 0 /100 WBCS — SIGNIFICANT CHANGE UP (ref 0–0)
PLATELET # BLD AUTO: 395 K/UL — SIGNIFICANT CHANGE UP (ref 150–400)
PLATELET # BLD AUTO: 395 K/UL — SIGNIFICANT CHANGE UP (ref 150–400)
POTASSIUM SERPL-MCNC: 3.7 MMOL/L — SIGNIFICANT CHANGE UP (ref 3.5–5.3)
POTASSIUM SERPL-MCNC: 3.7 MMOL/L — SIGNIFICANT CHANGE UP (ref 3.5–5.3)
POTASSIUM SERPL-SCNC: 3.7 MMOL/L — SIGNIFICANT CHANGE UP (ref 3.5–5.3)
POTASSIUM SERPL-SCNC: 3.7 MMOL/L — SIGNIFICANT CHANGE UP (ref 3.5–5.3)
PROTHROM AB SERPL-ACNC: 19.9 SEC — HIGH (ref 9.5–13)
PROTHROM AB SERPL-ACNC: 19.9 SEC — HIGH (ref 9.5–13)
RBC # BLD: 3.24 M/UL — LOW (ref 4.2–5.8)
RBC # BLD: 3.24 M/UL — LOW (ref 4.2–5.8)
RBC # FLD: 15.6 % — HIGH (ref 10.3–14.5)
RBC # FLD: 15.6 % — HIGH (ref 10.3–14.5)
SODIUM SERPL-SCNC: 137 MMOL/L — SIGNIFICANT CHANGE UP (ref 135–145)
SODIUM SERPL-SCNC: 137 MMOL/L — SIGNIFICANT CHANGE UP (ref 135–145)
WBC # BLD: 8.84 K/UL — SIGNIFICANT CHANGE UP (ref 3.8–10.5)
WBC # BLD: 8.84 K/UL — SIGNIFICANT CHANGE UP (ref 3.8–10.5)
WBC # FLD AUTO: 8.84 K/UL — SIGNIFICANT CHANGE UP (ref 3.8–10.5)
WBC # FLD AUTO: 8.84 K/UL — SIGNIFICANT CHANGE UP (ref 3.8–10.5)

## 2024-01-13 PROCEDURE — 36415 COLL VENOUS BLD VENIPUNCTURE: CPT

## 2024-01-13 PROCEDURE — 85027 COMPLETE CBC AUTOMATED: CPT

## 2024-01-13 PROCEDURE — 97162 PT EVAL MOD COMPLEX 30 MIN: CPT

## 2024-01-13 PROCEDURE — 87040 BLOOD CULTURE FOR BACTERIA: CPT

## 2024-01-13 PROCEDURE — 87077 CULTURE AEROBIC IDENTIFY: CPT

## 2024-01-13 PROCEDURE — 97112 NEUROMUSCULAR REEDUCATION: CPT

## 2024-01-13 PROCEDURE — 99285 EMERGENCY DEPT VISIT HI MDM: CPT | Mod: 25

## 2024-01-13 PROCEDURE — 97110 THERAPEUTIC EXERCISES: CPT

## 2024-01-13 PROCEDURE — 85025 COMPLETE CBC W/AUTO DIFF WBC: CPT

## 2024-01-13 PROCEDURE — 85730 THROMBOPLASTIN TIME PARTIAL: CPT

## 2024-01-13 PROCEDURE — 87186 SC STD MICRODIL/AGAR DIL: CPT

## 2024-01-13 PROCEDURE — 80053 COMPREHEN METABOLIC PANEL: CPT

## 2024-01-13 PROCEDURE — 86140 C-REACTIVE PROTEIN: CPT

## 2024-01-13 PROCEDURE — 87086 URINE CULTURE/COLONY COUNT: CPT

## 2024-01-13 PROCEDURE — 71045 X-RAY EXAM CHEST 1 VIEW: CPT

## 2024-01-13 PROCEDURE — 81001 URINALYSIS AUTO W/SCOPE: CPT

## 2024-01-13 PROCEDURE — 87150 DNA/RNA AMPLIFIED PROBE: CPT

## 2024-01-13 PROCEDURE — 80048 BASIC METABOLIC PNL TOTAL CA: CPT

## 2024-01-13 PROCEDURE — 96374 THER/PROPH/DIAG INJ IV PUSH: CPT

## 2024-01-13 PROCEDURE — 85610 PROTHROMBIN TIME: CPT

## 2024-01-13 PROCEDURE — 97116 GAIT TRAINING THERAPY: CPT

## 2024-01-13 RX ORDER — WARFARIN SODIUM 2.5 MG/1
1 TABLET ORAL
Qty: 5 | Refills: 0
Start: 2024-01-13 | End: 2024-01-17

## 2024-01-13 RX ADMIN — AMLODIPINE BESYLATE 10 MILLIGRAM(S): 2.5 TABLET ORAL at 04:54

## 2024-01-13 RX ADMIN — FINASTERIDE 5 MILLIGRAM(S): 5 TABLET, FILM COATED ORAL at 08:59

## 2024-01-13 RX ADMIN — LOSARTAN POTASSIUM 100 MILLIGRAM(S): 100 TABLET, FILM COATED ORAL at 04:53

## 2024-01-13 NOTE — CASE MANAGEMENT PROGRESS NOTE - NSCMPROGRESSNOTE_GEN_ALL_CORE
Per MD, patient is medically cleared for discharge home today.  CM met with son at bedside to discussed discharge disposition home with home care and resumption of [facility] aides. Ambulance set up for  for 11 am. Son verbalized understanding of the transition plan and is in agreement.  CM remains available throughout hospital stay.

## 2024-01-15 ENCOUNTER — LABORATORY RESULT (OUTPATIENT)
Age: 89
End: 2024-01-15

## 2024-01-15 ENCOUNTER — NON-APPOINTMENT (OUTPATIENT)
Age: 89
End: 2024-01-15

## 2024-01-22 ENCOUNTER — LABORATORY RESULT (OUTPATIENT)
Age: 89
End: 2024-01-22

## 2024-01-23 ENCOUNTER — NON-APPOINTMENT (OUTPATIENT)
Age: 89
End: 2024-01-23

## 2024-01-27 ENCOUNTER — INPATIENT (INPATIENT)
Facility: HOSPITAL | Age: 89
LOS: 2 days | Discharge: NURSING, REHAB OR LONG-TERM CARE | DRG: 699 | End: 2024-01-30
Attending: INTERNAL MEDICINE | Admitting: INTERNAL MEDICINE
Payer: MEDICARE

## 2024-01-27 VITALS
HEART RATE: 61 BPM | WEIGHT: 139.99 LBS | HEIGHT: 71 IN | OXYGEN SATURATION: 96 % | SYSTOLIC BLOOD PRESSURE: 155 MMHG | RESPIRATION RATE: 15 BRPM | TEMPERATURE: 98 F | DIASTOLIC BLOOD PRESSURE: 60 MMHG

## 2024-01-27 DIAGNOSIS — N39.0 URINARY TRACT INFECTION, SITE NOT SPECIFIED: ICD-10-CM

## 2024-01-27 LAB
ALBUMIN SERPL ELPH-MCNC: 2.8 G/DL — LOW (ref 3.3–5)
ALP SERPL-CCNC: 115 U/L — SIGNIFICANT CHANGE UP (ref 40–120)
ALT FLD-CCNC: 27 U/L — SIGNIFICANT CHANGE UP (ref 12–78)
ANION GAP SERPL CALC-SCNC: 5 MMOL/L — SIGNIFICANT CHANGE UP (ref 5–17)
APPEARANCE UR: ABNORMAL
APTT BLD: 48.1 SEC — HIGH (ref 24.5–35.6)
AST SERPL-CCNC: 25 U/L — SIGNIFICANT CHANGE UP (ref 15–37)
BACTERIA # UR AUTO: ABNORMAL /HPF
BASOPHILS # BLD AUTO: 0 K/UL — SIGNIFICANT CHANGE UP (ref 0–0.2)
BASOPHILS NFR BLD AUTO: 0 % — SIGNIFICANT CHANGE UP (ref 0–2)
BILIRUB SERPL-MCNC: 0.8 MG/DL — SIGNIFICANT CHANGE UP (ref 0.2–1.2)
BILIRUB UR-MCNC: NEGATIVE — SIGNIFICANT CHANGE UP
BUN SERPL-MCNC: 25 MG/DL — HIGH (ref 7–23)
CALCIUM SERPL-MCNC: 8.9 MG/DL — SIGNIFICANT CHANGE UP (ref 8.5–10.1)
CHLORIDE SERPL-SCNC: 106 MMOL/L — SIGNIFICANT CHANGE UP (ref 96–108)
CK SERPL-CCNC: 36 U/L — SIGNIFICANT CHANGE UP (ref 26–308)
CO2 SERPL-SCNC: 24 MMOL/L — SIGNIFICANT CHANGE UP (ref 22–31)
COLOR SPEC: YELLOW — SIGNIFICANT CHANGE UP
COMMENT - URINE 2: SIGNIFICANT CHANGE UP
COMMENT - URINE: SIGNIFICANT CHANGE UP
CREAT SERPL-MCNC: 0.88 MG/DL — SIGNIFICANT CHANGE UP (ref 0.5–1.3)
DIFF PNL FLD: NEGATIVE — SIGNIFICANT CHANGE UP
EGFR: 80 ML/MIN/1.73M2 — SIGNIFICANT CHANGE UP
EOSINOPHIL # BLD AUTO: 0.2 K/UL — SIGNIFICANT CHANGE UP (ref 0–0.5)
EOSINOPHIL NFR BLD AUTO: 1 % — SIGNIFICANT CHANGE UP (ref 0–6)
GLUCOSE SERPL-MCNC: 101 MG/DL — HIGH (ref 70–99)
GLUCOSE UR QL: NEGATIVE MG/DL — SIGNIFICANT CHANGE UP
HCT VFR BLD CALC: 32.5 % — LOW (ref 39–50)
HGB BLD-MCNC: 10.4 G/DL — LOW (ref 13–17)
INR BLD: 2.88 RATIO — HIGH (ref 0.85–1.18)
KETONES UR-MCNC: NEGATIVE MG/DL — SIGNIFICANT CHANGE UP
LACTATE SERPL-SCNC: 1.2 MMOL/L — SIGNIFICANT CHANGE UP (ref 0.7–2)
LEUKOCYTE ESTERASE UR-ACNC: ABNORMAL
LYMPHOCYTES # BLD AUTO: 0.4 K/UL — LOW (ref 1–3.3)
LYMPHOCYTES # BLD AUTO: 2 % — LOW (ref 13–44)
MANUAL SMEAR VERIFICATION: SIGNIFICANT CHANGE UP
MCHC RBC-ENTMCNC: 29.9 PG — SIGNIFICANT CHANGE UP (ref 27–34)
MCHC RBC-ENTMCNC: 32 GM/DL — SIGNIFICANT CHANGE UP (ref 32–36)
MCV RBC AUTO: 93.4 FL — SIGNIFICANT CHANGE UP (ref 80–100)
MONOCYTES # BLD AUTO: 0.79 K/UL — SIGNIFICANT CHANGE UP (ref 0–0.9)
MONOCYTES NFR BLD AUTO: 4 % — SIGNIFICANT CHANGE UP (ref 2–14)
NEUTROPHILS # BLD AUTO: 18.25 K/UL — HIGH (ref 1.8–7.4)
NEUTROPHILS NFR BLD AUTO: 90 % — HIGH (ref 43–77)
NEUTS BAND # BLD: 2 % — SIGNIFICANT CHANGE UP (ref 0–8)
NITRITE UR-MCNC: NEGATIVE — SIGNIFICANT CHANGE UP
NRBC # BLD: 0 /100 WBCS — SIGNIFICANT CHANGE UP (ref 0–0)
NRBC # BLD: SIGNIFICANT CHANGE UP /100 WBCS (ref 0–0)
PH UR: 8.5 (ref 5–8)
PLAT MORPH BLD: NORMAL — SIGNIFICANT CHANGE UP
PLATELET # BLD AUTO: 378 K/UL — SIGNIFICANT CHANGE UP (ref 150–400)
POTASSIUM SERPL-MCNC: 3.9 MMOL/L — SIGNIFICANT CHANGE UP (ref 3.5–5.3)
POTASSIUM SERPL-SCNC: 3.9 MMOL/L — SIGNIFICANT CHANGE UP (ref 3.5–5.3)
PROT SERPL-MCNC: 6.8 G/DL — SIGNIFICANT CHANGE UP (ref 6–8.3)
PROT UR-MCNC: 100 MG/DL
PROTHROM AB SERPL-ACNC: 32.7 SEC — HIGH (ref 9.5–13)
RBC # BLD: 3.48 M/UL — LOW (ref 4.2–5.8)
RBC # FLD: 16 % — HIGH (ref 10.3–14.5)
RBC BLD AUTO: SIGNIFICANT CHANGE UP
RBC CASTS # UR COMP ASSIST: 15 /HPF — HIGH (ref 0–4)
SODIUM SERPL-SCNC: 135 MMOL/L — SIGNIFICANT CHANGE UP (ref 135–145)
SP GR SPEC: 1.01 — SIGNIFICANT CHANGE UP (ref 1–1.03)
TRI-PHOS CRY UR QL COMP ASSIST: PRESENT
UROBILINOGEN FLD QL: 0.2 MG/DL — SIGNIFICANT CHANGE UP (ref 0.2–1)
VARIANT LYMPHS # BLD: 1 % — SIGNIFICANT CHANGE UP (ref 0–6)
WBC # BLD: 19.84 K/UL — HIGH (ref 3.8–10.5)
WBC # FLD AUTO: 19.84 K/UL — HIGH (ref 3.8–10.5)
WBC UR QL: 6 /HPF — HIGH (ref 0–5)

## 2024-01-27 PROCEDURE — 99222 1ST HOSP IP/OBS MODERATE 55: CPT

## 2024-01-27 PROCEDURE — 99285 EMERGENCY DEPT VISIT HI MDM: CPT | Mod: 25

## 2024-01-27 RX ORDER — INFLUENZA VIRUS VACCINE 15; 15; 15; 15 UG/.5ML; UG/.5ML; UG/.5ML; UG/.5ML
0.7 SUSPENSION INTRAMUSCULAR ONCE
Refills: 0 | Status: DISCONTINUED | OUTPATIENT
Start: 2024-01-27 | End: 2024-01-30

## 2024-01-27 RX ORDER — SODIUM CHLORIDE 9 MG/ML
1000 INJECTION INTRAMUSCULAR; INTRAVENOUS; SUBCUTANEOUS ONCE
Refills: 0 | Status: COMPLETED | OUTPATIENT
Start: 2024-01-27 | End: 2024-01-27

## 2024-01-27 RX ORDER — CEFTRIAXONE 500 MG/1
1000 INJECTION, POWDER, FOR SOLUTION INTRAMUSCULAR; INTRAVENOUS ONCE
Refills: 0 | Status: COMPLETED | OUTPATIENT
Start: 2024-01-27 | End: 2024-01-27

## 2024-01-27 RX ADMIN — CEFTRIAXONE 1000 MILLIGRAM(S): 500 INJECTION, POWDER, FOR SOLUTION INTRAMUSCULAR; INTRAVENOUS at 13:47

## 2024-01-27 RX ADMIN — SODIUM CHLORIDE 1000 MILLILITER(S): 9 INJECTION INTRAMUSCULAR; INTRAVENOUS; SUBCUTANEOUS at 10:09

## 2024-01-27 RX ADMIN — SODIUM CHLORIDE 1000 MILLILITER(S): 9 INJECTION INTRAMUSCULAR; INTRAVENOUS; SUBCUTANEOUS at 13:47

## 2024-01-27 RX ADMIN — CEFTRIAXONE 100 MILLIGRAM(S): 500 INJECTION, POWDER, FOR SOLUTION INTRAMUSCULAR; INTRAVENOUS at 10:09

## 2024-01-27 NOTE — H&P ADULT - ASSESSMENT
89-year-old male PMH of A-fib on Coumadin, chronic indwelling Palomino, HTN, BPH, recent admission 1/5 through 1/13/2024 for UTI/sepsis now admitted with urinary retention dt clogged palomino, leucocytosis  r/o UTI    #urinary retention dt clogged palomino  r/o UTI sp ceftriaxone  in ED  UA cw chronic palomino , fu ucx  ID cx noted- observe off abx  BPH hx-resume flomax, proscar    #leucocytosis- monitor trend  gentle ivf    #hematuria- admission UA- no blood  palomino replaced in ED- bag with bloody urine  likely traumatic palomino  monitor hb    #Afib on coumadin- INR 2.8  will hold off on dosing coumadin given galileo hematuria  d/w Cards - evaluating pt for Eliquis    #HTN- resume home meds    #DVT ppx- Inr therapeutic  venodynes as needed    [facility]   [phone]

## 2024-01-27 NOTE — ED ADULT NURSE NOTE - NSFALLHARMRISKINTERV_ED_ALL_ED

## 2024-01-27 NOTE — PATIENT PROFILE ADULT - FALL HARM RISK - HARM RISK INTERVENTIONS
Assistance with ambulation/Assistance OOB with selected safe patient handling equipment/Communicate Risk of Fall with Harm to all staff/Discuss with provider need for PT consult/Monitor gait and stability/Reinforce activity limits and safety measures with patient and family/Tailored Fall Risk Interventions/Visual Cue: Yellow wristband and red socks/Bed in lowest position, wheels locked, appropriate side rails in place/Call bell, personal items and telephone in reach/Instruct patient to call for assistance before getting out of bed or chair/Non-slip footwear when patient is out of bed/[place] to call system/Physically safe environment - no spills, clutter or unnecessary equipment/Purposeful Proactive Rounding/Room/bathroom lighting operational, light cord in reach

## 2024-01-27 NOTE — CONSULT NOTE ADULT - SUBJECTIVE AND OBJECTIVE BOX
[facility] DIVISION of INFECTIOUS DISEASE  Bin Garcia MD PhD, Brooklyn Amin MD, Denise Sarabia MD, Grady Rico MD, Cyrus Espinoza MD  and providing coverage with Stefany Qureshi MD  Providing Infectious Disease Consultations at [facility], [facility], [facility]    Office# [phone] to schedule follow up appointments  Answering Service for urgent calls or New Consults [phone]  Cell# to text for urgent issues Bin Garcia [phone]     HPI: 89-year-old male PMH of A-fib on Coumadin, chronic Craig, HTN, BPH, recent admission  through 2024.  Patient was admitted for UTI/sepsis, presenting to the emergency department by ambulance from home with report of Craig catheter not draining, patient complaining of suprapubic pressure. Pt reports yesterday his lower abd became hard as a rock but now pressure relieved. Anxious to get home tomorrow prior to start of the football games.      PAST MEDICAL & SURGICAL HISTORY:  HTN (hypertension)      Atrial fibrillation      Degeneration macular          Antimicrobials      Immunological      Other      Allergies    No Known Allergies    Intolerances        SOCIAL HISTORY:  no toxic habits reported      FAMILY HISTORY: noncontrib      ROS:    EYES:  Negative  blurry vision or double vision  GASTROINTESTINAL:  Negative for nausea, vomiting, diarrhea  -otherwise negative except for subjective    Vital Signs Last 24 Hrs  T(C): 36.5 (2024 11:22), Max: 36.5 (2024 11:22)  T(F): 97.7 (2024 11:22), Max: 97.7 (2024 11:22)  HR: 58 (2024 11:22) (58 - 61)  BP: 166/64 (2024 11:22) (155/60 - 166/64)  BP(mean): --  RR: 16 (2024 11:22) (15 - 16)  SpO2: 97% (2024 11:22) (96% - 97%)    Parameters below as of 2024 11:22  Patient On (Oxygen Delivery Method): room air        PE:  In no distress  HEENT:  NC, PERRL, sclerae anicteric, conjunctivae clear, EOMI.  Sinuses nontender, no nasal exudate.  No buccal or pharyngeal lesions, erythema or exudate  Neck:  Supple, no adenopathy  Lungs:  No accessory muscle use, bilaterally clear to auscultation  Cor:  distant  Abd:  Symmetric, normoactive BS.  Soft, nontender, no masses, guarding or rebound.  Liver and spleen not enlarged  Extrem:  No cyanosis or edema  Skin:  No rashes.  Neuro: grossly intact  Musc: moving all limbs freely, no focal deficits  Noted blood in urine      LABS:                        10.4   19.84 )-----------( 378      ( 2024 09:15 )             32.5       WBC Count: 19.84 K/uL (24 @ 09:15)          135  |  106  |  25<H>  ----------------------------<  101<H>  3.9   |  24  |  0.88    Ca    8.9      2024 09:15    TPro  6.8  /  Alb  2.8<L>  /  TBili  0.8  /  DBili  x   /  AST  25  /  ALT    /  AlkPhos  115        Creatinine: 0.88 mg/dL (24 @ 09:15)      Urinalysis Basic - ( 2024 09:15 )    Color: Yellow / Appearance: Turbid / S.013 / pH: x  Gluc: 101 mg/dL / Ketone: Negative mg/dL  / Bili: Negative / Urobili: 0.2 mg/dL   Blood: x / Protein: 100 mg/dL / Nitrite: Negative   Leuk Esterase: Large / RBC: 15 /HPF / WBC 6 /HPF   Sq Epi: x / Non Sq Epi: x / Bacteria: Few /HPF      MICROBIOLOGY:      RADIOLOGY & ADDITIONAL STUDIES:    --

## 2024-01-27 NOTE — ED PROVIDER NOTE - OBJECTIVE STATEMENT
89-year-old male PMH of A-fib on Coumadin, chronic Craig, HTN, BPH, recent admission 1/5 through 1/13/2024.  Patient was admitted for UTI/sepsis, presenting to the emergency department by ambulance from home with report of Craig catheter not draining, patient complaining of suprapubic pressure.

## 2024-01-27 NOTE — ED ADULT NURSE NOTE - NSHOSCREENINGQ1_ED_ALL_ED
"Patient states he is concerned about foot pain and toenails coming off bilaterally. He is worried that \"he has damage\" from being in the cold and having wet shoes. Would like socks and gauze to take home.   "
No

## 2024-01-27 NOTE — CONSULT NOTE ADULT - NS ATTEND AMEND GEN_ALL_CORE FT
Ananth is a  89-year-old male PMH of A-fib on Coumadin, HTN, BPH, recent admission 1/5 through 1/13/2024, for urinary retention/UTI/sepsis, now here with non functioning palomino and UTI.    - slow af, off av brandon blockers  - on coumadin with therapeutic inr (2.88), though is having hematuria  - can consider switching coumadin to eliquis once bleeding issues have subsided  - known severe ai, and deemed not a surgical candidate in past  - cont losartan and amlodipine  - cont abx for uti  - trend creatinine and electrolytes. keep K>4, mg>2  - will follow with you

## 2024-01-27 NOTE — CONSULT NOTE ADULT - ASSESSMENT
89-year-old male PMH of A-fib on Coumadin, chronic Palomino, HTN, BPH, recent admission (1/5- 13/2024) for UTI/sepsis, p/w c/o Palomino catheter not draining with suprapubic pressure.    Afib, HTN   - p/w non draining palomino catheter with suprapubic pressure    - EKG: Afib with SVR with RBBB (chronic), unchanged from prior     - no anginal complaints, no need to trend further   - Monitor and replete Lytes. Keep K > 4 and Mg > 2    - TTE (2021) normal LVSF, EF 55%, severe LAE, and mod LVH.  Can repeat routinely  - No evidence of volume overload  - No O2 requirement and SPO2 97%     - Will continue to follow.    MAYI AlvarezCLARISA  Nurse Practitioner - Cardiology   call TEAMS   89-year-old male PMH of A-fib on Coumadin, chronic Palomino, HTN, BPH, recent admission (1/5- 13/2024) for UTI/sepsis, p/w c/o Palomino catheter not draining with suprapubic pressure.    Afib (on Coumadin) HTN   - p/w non draining Palomino catheter with suprapubic pressure, found to have +UTI and hematuria, ID consulted    - EKG: Afib with SVR,  RBBB (chronic), unchanged from prior   - no anginal complaints    - known hx of Afib on coumadin at home  - well known form our service, see Dr. Kulkarni  - admitted with supratherapeutic INR up to 8 in the past requiring reversal  - continue to monitor INR: 2.8 today, (goal 2-3)  would hold for now in setting of gross hematuria likely 2/2 to UTI vs traumatic palomino insertion   - with recurrence supra INR with now hematuria, would possibly benefit in switching to DOAC in the future.  - /56  - continue home meds on Losartan and Norvasc with hold parameters  - Monitor and replete Lytes. Keep K > 4 and Mg > 2    - CXR no acute pathology  - TTE (2021) normal LVSF, EF 55%, severe LAE, and mod LVH.  Can repeat routinely  - No evidence of volume overload  - No O2 requirement     - Will continue to follow.    Mariangel Gilbert [facility]  Nurse Practitioner - Cardiology   call TEAMS

## 2024-01-27 NOTE — CONSULT NOTE ADULT - SUBJECTIVE AND OBJECTIVE BOX
DOCUMENTATION IN PROGRESS     CHIEF COMPLAINT: Patient is a 89y old  Male who presents with a chief complaint of     HPI:  This is a 89-year-old male PMH of A-fib on Coumadin, chronic Craig, HTN, BPH, recent admission 1/5 through 1/13/2024.  Patient was admitted for UTI/sepsis, presenting to the emergency department by ambulance from home with report of Craig catheter not draining, patient complaining of suprapubic pressure.    EKG: Afib with SVR     REVIEW OF SYSTEMS:   All other review of systems are negative unless indicated above    PAST MEDICAL & SURGICAL HISTORY:  HTN (hypertension)      Atrial fibrillation      Degeneration macular          SOCIAL HISTORY:  No tobacco, ethanol, or drug abuse.    FAMILY HISTORY:    No family history of acute MI or sudden cardiac death.    MEDICATIONS  (STANDING):    MEDICATIONS  (PRN):      Allergies    No Known Allergies    Intolerances        Home meds:  Home Medications:  acetaminophen 325 mg oral tablet: 2 tab(s) orally every 6 hours As needed Temp greater or equal to 38.5C (101.3F), Moderate Pain (4 - 6) (05 Jan 2024 20:07)  finasteride 5 mg oral tablet: 1 tab(s) orally once a day (05 Jan 2024 20:07)  latanoprost 0.005% ophthalmic solution: 1 drop(s) in each eye once a day (05 Jan 2024 20:07)  losartan 100 mg oral tablet: 1 tab(s) orally once a day (05 Jan 2024 20:07)  Multiple Vitamins with Minerals oral tablet: 1 tab(s) orally once a day (05 Jan 2024 20:07)  Norvasc 10 mg oral tablet: 1 tab(s) orally once a day (05 Jan 2024 20:07)  tamsulosin 0.4 mg oral capsule: 2 cap(s) orally once a day (at bedtime) (05 Jan 2024 20:07)        VITAL SIGNS:   Vital Signs Last 24 Hrs  T(C): 36.5 (27 Jan 2024 11:22), Max: 36.5 (27 Jan 2024 11:22)  T(F): 97.7 (27 Jan 2024 11:22), Max: 97.7 (27 Jan 2024 11:22)  HR: 58 (27 Jan 2024 11:22) (58 - 61)  BP: 166/64 (27 Jan 2024 11:22) (155/60 - 166/64)  BP(mean): --  RR: 16 (27 Jan 2024 11:22) (15 - 16)  SpO2: 97% (27 Jan 2024 11:22) (96% - 97%)    Parameters below as of 27 Jan 2024 11:22  Patient On (Oxygen Delivery Method): room air        I&O's Summary      On Exam:  TELE: Afib  Constitutional: NAD, awake and alert  HEENT: Moist Mucous Membranes, Anicteric  Pulmonary: Non-labored, breath sounds are clear bilaterally, No wheezing, rales or rhonchi  Cardiovascular: Regular, S1 and S2, No murmurs, rubs, gallops or clicks  Gastrointestinal: Bowel Sounds present, soft, nontender.   Lymph: No peripheral edema. No lymphadenopathy.  Skin: No visible rashes or ulcers.  Psych:  Mood & affect appropriate for situation    LABS: All Labs Reviewed:                        10.4   19.84 )-----------( 378      ( 27 Jan 2024 09:15 )             32.5     27 Jan 2024 09:15    135    |  106    |  25     ----------------------------<  101    3.9     |  24     |  0.88     Ca    8.9        27 Jan 2024 09:15    TPro  6.8    /  Alb  2.8    /  TBili  0.8    /  DBili  x      /  AST  25     /  ALT  27     /  AlkPhos  115    27 Jan 2024 09:15    PT/INR - ( 27 Jan 2024 09:15 )   PT: 32.7 sec;   INR: 2.88 ratio         PTT - ( 27 Jan 2024 09:15 )  PTT:48.1 sec      Blood Culture:         RADIOLOGY:              CHIEF COMPLAINT: Patient is a 89y old  Male who presents with a chief complaint of     HPI:  This is a 89-year-old male PMH of A-fib on Coumadin, chronic Craig, HTN, BPH, recent admission 1/5 through 1/13/2024.  Patient was admitted for UTI/sepsis, presenting to the emergency department by ambulance from home with report of Craig catheter not draining, patient complaining of suprapubic pressure.    EKG: Afib with SVR     REVIEW OF SYSTEMS:   All other review of systems are negative unless indicated above    PAST MEDICAL & SURGICAL HISTORY:  HTN (hypertension)      Atrial fibrillation      Degeneration macular          SOCIAL HISTORY:  No tobacco, ethanol, or drug abuse.    FAMILY HISTORY:    No family history of acute MI or sudden cardiac death.    MEDICATIONS  (STANDING):    MEDICATIONS  (PRN):      Allergies    No Known Allergies    Intolerances        Home meds:  Home Medications:  acetaminophen 325 mg oral tablet: 2 tab(s) orally every 6 hours As needed Temp greater or equal to 38.5C (101.3F), Moderate Pain (4 - 6) (05 Jan 2024 20:07)  finasteride 5 mg oral tablet: 1 tab(s) orally once a day (05 Jan 2024 20:07)  latanoprost 0.005% ophthalmic solution: 1 drop(s) in each eye once a day (05 Jan 2024 20:07)  losartan 100 mg oral tablet: 1 tab(s) orally once a day (05 Jan 2024 20:07)  Multiple Vitamins with Minerals oral tablet: 1 tab(s) orally once a day (05 Jan 2024 20:07)  Norvasc 10 mg oral tablet: 1 tab(s) orally once a day (05 Jan 2024 20:07)  tamsulosin 0.4 mg oral capsule: 2 cap(s) orally once a day (at bedtime) (05 Jan 2024 20:07)        VITAL SIGNS:   Vital Signs Last 24 Hrs  T(C): 36.5 (27 Jan 2024 11:22), Max: 36.5 (27 Jan 2024 11:22)  T(F): 97.7 (27 Jan 2024 11:22), Max: 97.7 (27 Jan 2024 11:22)  HR: 58 (27 Jan 2024 11:22) (58 - 61)  BP: 166/64 (27 Jan 2024 11:22) (155/60 - 166/64)  BP(mean): --  RR: 16 (27 Jan 2024 11:22) (15 - 16)  SpO2: 97% (27 Jan 2024 11:22) (96% - 97%)    Parameters below as of 27 Jan 2024 11:22  Patient On (Oxygen Delivery Method): room air        I&O's Summary      On Exam:  TELE: Not on telemetry   Constitutional: NAD, awake and alert  HEENT: Moist Mucous Membranes, Anicteric  Pulmonary: Non-labored, breath sounds are clear bilaterally, No wheezing, rales or rhonchi  Cardiovascular: Regular, S1 and S2, +murmurs, rubs, gallops or clicks  Gastrointestinal: Bowel Sounds present, soft, nontender.   Lymph: No peripheral edema. No lymphadenopathy.  Skin: No visible rashes or ulcers.  Psych:  Mood & affect appropriate for situation    LABS: All Labs Reviewed:                        10.4   19.84 )-----------( 378      ( 27 Jan 2024 09:15 )             32.5     27 Jan 2024 09:15    135    |  106    |  25     ----------------------------<  101    3.9     |  24     |  0.88     Ca    8.9        27 Jan 2024 09:15    TPro  6.8    /  Alb  2.8    /  TBili  0.8    /  DBili  x      /  AST  25     /  ALT  27     /  AlkPhos  115    27 Jan 2024 09:15    PT/INR - ( 27 Jan 2024 09:15 )   PT: 32.7 sec;   INR: 2.88 ratio         PTT - ( 27 Jan 2024 09:15 )  PTT:48.1 sec            CHIEF COMPLAINT: Patient is a 89y old  Male who presents with a chief complaint of UTI    HPI:  This is a 89-year-old male PMH of A-fib on Coumadin, chronic Craig, HTN, BPH, recent admission 1/5 through 1/13/2024.  Patient was admitted for UTI/sepsis, presenting to the emergency department by ambulance from home with report of Craig catheter not draining, patient complaining of suprapubic pressure.    He was seen by us in the hospital in 10/23 with FTT, weakness, fatigue, dehydration, and decreased urine output. He was also found to have supratherapeutic INR (~8), BERNADETTE, and mildly elevated troponin of 76, during this time. INR now 2.88      EKG: Afib with SVR     REVIEW OF SYSTEMS:   All other review of systems are negative unless indicated above    PAST MEDICAL & SURGICAL HISTORY:  HTN (hypertension)      Atrial fibrillation      Degeneration macular          SOCIAL HISTORY:  No tobacco, ethanol, or drug abuse.    FAMILY HISTORY:    No family history of acute MI or sudden cardiac death.    MEDICATIONS  (STANDING):    MEDICATIONS  (PRN):      Allergies    No Known Allergies    Intolerances        Home meds:  Home Medications:  acetaminophen 325 mg oral tablet: 2 tab(s) orally every 6 hours As needed Temp greater or equal to 38.5C (101.3F), Moderate Pain (4 - 6) (05 Jan 2024 20:07)  finasteride 5 mg oral tablet: 1 tab(s) orally once a day (05 Jan 2024 20:07)  latanoprost 0.005% ophthalmic solution: 1 drop(s) in each eye once a day (05 Jan 2024 20:07)  losartan 100 mg oral tablet: 1 tab(s) orally once a day (05 Jan 2024 20:07)  Multiple Vitamins with Minerals oral tablet: 1 tab(s) orally once a day (05 Jan 2024 20:07)  Norvasc 10 mg oral tablet: 1 tab(s) orally once a day (05 Jan 2024 20:07)  tamsulosin 0.4 mg oral capsule: 2 cap(s) orally once a day (at bedtime) (05 Jan 2024 20:07)        VITAL SIGNS:   Vital Signs Last 24 Hrs  T(C): 36.5 (27 Jan 2024 11:22), Max: 36.5 (27 Jan 2024 11:22)  T(F): 97.7 (27 Jan 2024 11:22), Max: 97.7 (27 Jan 2024 11:22)  HR: 58 (27 Jan 2024 11:22) (58 - 61)  BP: 166/64 (27 Jan 2024 11:22) (155/60 - 166/64)  BP(mean): --  RR: 16 (27 Jan 2024 11:22) (15 - 16)  SpO2: 97% (27 Jan 2024 11:22) (96% - 97%)    Parameters below as of 27 Jan 2024 11:22  Patient On (Oxygen Delivery Method): room air        I&O's Summary      On Exam:  TELE: Not on telemetry   Constitutional: NAD, awake and alert  HEENT: Moist Mucous Membranes, Anicteric  Pulmonary: Non-labored, breath sounds are clear bilaterally, No wheezing, rales or rhonchi  Cardiovascular: Regular, S1 and S2, +murmurs, rubs, gallops or clicks  Gastrointestinal: Bowel Sounds present, soft, nontender.   Lymph: No peripheral edema. No lymphadenopathy.  Skin: No visible rashes or ulcers.  Psych:  Mood & affect appropriate for situation    LABS: All Labs Reviewed:                        10.4 19.84 )-----------( 378      ( 27 Jan 2024 09:15 )             32.5     27 Jan 2024 09:15    135    |  106    |  25     ----------------------------<  101    3.9     |  24     |  0.88     Ca    8.9        27 Jan 2024 09:15    TPro  6.8    /  Alb  2.8    /  TBili  0.8    /  DBili  x      /  AST  25     /  ALT  27     /  AlkPhos  115    27 Jan 2024 09:15    PT/INR - ( 27 Jan 2024 09:15 )   PT: 32.7 sec;   INR: 2.88 ratio         PTT - ( 27 Jan 2024 09:15 )  PTT:48.1 sec

## 2024-01-27 NOTE — PATIENT PROFILE ADULT - NSPROPTRIGHTBILLOFRIGHTS_GEN_A_NUR
[address]OME[address]  [id number]  [address] [place] [address]  Phone: [phone]  Fax: [phone]  Follow Up Time:    patient

## 2024-01-27 NOTE — H&P ADULT - HISTORY OF PRESENT ILLNESS
89-year-old male PMH of A-fib on Coumadin, chronic Palomino, HTN, BPH, recent admission 1/5 through 1/13/2024.  Patient was admitted for UTI/sepsis, presenting to the emergency department by ambulance from home with report of Palomino catheter not draining, patient complaining of suprapubic pressure.  IN ED palomino replaced with good urine flow and relief of suprapubic pressure  palomino bag - noted- bloody urine

## 2024-01-27 NOTE — ED ADULT NURSE NOTE - NS ED NURSE PRESS ULCER APPEAR 11
Pt is having a root canal not shahid yet do you want her to take a antibiotic if so how many days before  then you will need to send it in to [facility] on file . If any q call [phone]  
Spoke to pt and informed that Dr Vargas is out of office and will be back on 9/7 and will consult with him regarding the antibiotic prior to the procedure. Per pt, she has an OV already schd with Dr Vargas and will ask him at the time of the visit.   
clean/dry

## 2024-01-27 NOTE — ED ADULT NURSE NOTE - OBJECTIVE STATEMENT
patient brought in from home with urinary catheter problems.  Patient states he has had this Craig since 11/2023 when he previously had urinary problems.  at this time the patent states he is having lower abdominal pain and his catheter does not seem to be draining.  Patient was bladder scanned for >862ml.  patient states that his Urologist is Dr. Griffith.  he had an appointment the first week of february to have his Craig changed. patient brought in from home with urinary catheter problems.  Patient states he has had this Palomino since 11/2023 when he previously had urinary problems.  at this time the patent states he is having lower abdominal pain and his catheter does not seem to be draining.  Patient was bladder scanned for >862ml.  patient states that his Urologist is Dr. Griffith.  he had an appointment the first week of february to have his Palomino changed.  patient has had chronic palomino to the point of erosion down the underside of his penis down to the base.

## 2024-01-27 NOTE — H&P ADULT - NSICDXPASTMEDICALHX_GEN_ALL_CORE_FT
PAST MEDICAL HISTORY:  Atrial fibrillation     Chronic indwelling Craig catheter     Degeneration macular     History of BPH     HTN (hypertension)

## 2024-01-27 NOTE — H&P ADULT - NSHPPHYSICALEXAM_GEN_ALL_CORE
Vitals last 24 hrs  T(C): 36.4 (01-27-24 @ 13:17), Max: 36.5 (01-27-24 @ 11:22)  HR: 55 (01-27-24 @ 13:17) (55 - 61)  BP: 145/56 (01-27-24 @ 13:17) (145/56 - 166/64)  RR: 17 (01-27-24 @ 13:17) (15 - 17)  SpO2: 97% (01-27-24 @ 13:17) (96% - 97%)    PHYSICAL EXAM:  GENERAL: NAD, well-groomed, well-developed  HEAD:  Atraumatic, Normocephalic  EYES: EOMI, PERRLA, conjunctiva and sclera clear  ENMT: No tonsillar erythema, exudates, or enlargement; Moist mucous membranes  NECK: Supple, No JVD, Normal thyroid  HEART: Regular rate and rhythm; No murmurs, rubs, or gallops  RESPIRATORY: CTA B/L, No W/R/R  ABDOMEN: Soft, Nontender, Nondistended; Bowel sounds present  NEUROLOGY: A&Ox2, nonfocal, moving all extremities  EXTREMITIES:  2+ Peripheral Pulses, No clubbing, cyanosis, or edema  SKIN: warm, dry, normal color, no rash or abnormal lesions  palomino bag with bloody urine

## 2024-01-27 NOTE — H&P ADULT - NSHPLABSRESULTS_GEN_ALL_CORE
LABS:                        10.4   19.84 )-----------( 378      ( 2024 09:15 )             32.5         135  |  106  |  25<H>  ----------------------------<  101<H>  3.9   |  24  |  0.88    Ca    8.9      2024 09:15    TPro  6.8  /  Alb  2.8<L>  /  TBili  0.8  /  DBili  x   /  AST    /  ALT    /  AlkPhos  115      PT/INR - ( 2024 09:15 )   PT: 32.7 sec;   INR: 2.88 ratio         PTT - ( 2024 09:15 )  PTT:48.1 sec  CAPILLARY BLOOD GLUCOSE            Urinalysis Basic - ( 2024 09:15 )    Color: Yellow / Appearance: Turbid / S.013 / pH: x  Gluc: 101 mg/dL / Ketone: Negative mg/dL  / Bili: Negative / Urobili: 0.2 mg/dL   Blood: x / Protein: 100 mg/dL / Nitrite: Negative   Leuk Esterase: Large / RBC: 15 /HPF / WBC 6 /HPF   Sq Epi: x / Non Sq Epi: x / Bacteria: Few /HPF        RADIOLOGY & ADDITIONAL TESTS:

## 2024-01-27 NOTE — ED ADULT TRIAGE NOTE - HOW PATIENT ADDRESSED, PROFILE
Subjective:       Patient ID: Naomi Toledo is a 67 y.o. female.    Chief Complaint: Follow-up    Ms. Toledo is a 67-year-old female patient who presents today for chronic conditions follow-up.  Previous primary care physician Dr. Hope, has appointment to establish with Dr. Benedict in January.  Medical history includes multiple sclerosis, for which she is followed by Neurology.  She is feeling well today without complaint.  She is due for a flu vaccine and colonoscopy.  Request external referral to Dr. Rich for colonoscopy.  She is planning to move to [facility].  This will be after next spring.  Most of her children live in that area.  Vital stable.    Recent labs reviewed, stable.    Patient Active Problem List   Diagnosis    Hyperglycemia    MS (multiple sclerosis)    Positive NURIA (antinuclear antibody)    Encounter for long-term (current) use of high-risk medication    Urinary urgency    Prophylactic immunotherapy       Current Outpatient Medications:     AVONEX 30 mcg/0.5 mL PnKt, Inject 0.5 mLs (30 mcg total) into the muscle every 7 days., Disp: 4 pen, Rfl: 0    cholecalciferol, vitamin D3, (VITAMIN D3) 5,000 unit Tab, Take 5,000 Units by mouth once daily., Disp: , Rfl:     Lab Results   Component Value Date    WBC 5.06 10/14/2019    HGB 14.8 10/14/2019    HCT 43.0 10/14/2019     10/14/2019    CHOL 229 (H) 04/30/2019    TRIG 87 04/30/2019    HDL 60 04/30/2019    ALT 39 10/14/2019    AST 35 10/14/2019     04/30/2019    K 4.2 04/30/2019     04/30/2019    CREATININE 1.1 04/30/2019    BUN 24 (H) 04/30/2019    CO2 23 04/30/2019    TSH 1.395 08/11/2016    GLUF 95 03/08/2018    HGBA1C 5.8 (H) 04/30/2019       Review of Systems   Constitutional: Negative for appetite change, chills, diaphoresis, fatigue and fever.   HENT: Negative for congestion, ear pain, postnasal drip, sinus pressure, sinus pain and sore throat.    Eyes: Negative for pain and visual disturbance.    Respiratory: Negative for cough and shortness of breath.    Cardiovascular: Negative for chest pain and leg swelling.   Gastrointestinal: Negative for abdominal pain, blood in stool, constipation, diarrhea, nausea and vomiting.   Genitourinary: Negative for dysuria, frequency, hematuria and urgency.   Musculoskeletal: Negative for arthralgias, back pain and myalgias.   Neurological: Negative for dizziness, weakness, light-headedness and headaches.   All other systems reviewed and are negative.      Objective:      Physical Exam   Constitutional: She is oriented to person, place, and time. Vital signs are normal. She appears well-developed and well-nourished. No distress.   Obese body habitus    Cardiovascular: Normal rate, regular rhythm and normal heart sounds.   Pulmonary/Chest: Effort normal and breath sounds normal. She has no wheezes.   Abdominal: Soft. Normal appearance.   Musculoskeletal: She exhibits edema (trace edema ankle ).   Neurological: She is alert and oriented to person, place, and time.   Skin: Skin is warm and dry.   Psychiatric: She has a normal mood and affect.   Nursing note and vitals reviewed.      Assessment:       1. MS (multiple sclerosis)    2. Hyperglycemia    3. Vitamin D deficiency    4. Flu vaccine need    5. Colon cancer screening        Plan:       Naomi was seen today for follow-up.    Diagnoses and all orders for this visit:    MS (multiple sclerosis)  Neurology following    Hyperglycemia  Recent labs reviewed, stable  Monitor  Your blood sugar is borderline high.  This means you are at risk for developing type 2 diabetes mellitus.  To lessen your risk you should exercise regularly, avoid excess carbohydrates and work toward a body mass index of less than 25.      Vitamin D deficiency  Recent labs reviewed, stable    Flu vaccine need  -     Influenza - High Dose (65+) (PF) (IM)    Colon cancer screening  -     Ambulatory referral to Gastroenterology  External referral provided  to the patient    Follow-up to establish care with Dr. Benedict       Ananth

## 2024-01-27 NOTE — CONSULT NOTE ADULT - ASSESSMENT
89-year-old male PMH of A-fib on Coumadin, chronic Craig, HTN, BPH, recent admission 1/5 through 1/13/2024.  Patient was admitted for UTI/sepsis, presenting to the emergency department by ambulance from home with report of Craig catheter not draining, patient complaining of suprapubic pressure. Pt reports yesterday his lower abd became hard as a rock but now pressure relieved. Anxious to get home tomorrow prior to start of the football games.    RECOMMENDATIONS  1-Urinary retention/?UTI pt with symptoms limited to those associated with retention and noted hematuria, pt afebrile, leukocytosis, dose of ceftriaxone given and pt is at risk of uti but nos is pyuria so for now do rec observation with leukocytosis but recs to follow regarding further doing of abx    Thank you for consulting us and involving us in the management of this most interesting and challenging case.  We will follow along in the care of this patient. Please call us at [phone] or text me directly on my cell# at [phone] with any concerns.

## 2024-01-28 LAB
ANION GAP SERPL CALC-SCNC: 4 MMOL/L — LOW (ref 5–17)
BUN SERPL-MCNC: 20 MG/DL — SIGNIFICANT CHANGE UP (ref 7–23)
CALCIUM SERPL-MCNC: 8.3 MG/DL — LOW (ref 8.5–10.1)
CHLORIDE SERPL-SCNC: 109 MMOL/L — HIGH (ref 96–108)
CO2 SERPL-SCNC: 25 MMOL/L — SIGNIFICANT CHANGE UP (ref 22–31)
CREAT SERPL-MCNC: 0.66 MG/DL — SIGNIFICANT CHANGE UP (ref 0.5–1.3)
EGFR: 87 ML/MIN/1.73M2 — SIGNIFICANT CHANGE UP
GLUCOSE SERPL-MCNC: 83 MG/DL — SIGNIFICANT CHANGE UP (ref 70–99)
HCT VFR BLD CALC: 27.5 % — LOW (ref 39–50)
HGB BLD-MCNC: 8.8 G/DL — LOW (ref 13–17)
INR BLD: 3.2 RATIO — HIGH (ref 0.85–1.18)
MCHC RBC-ENTMCNC: 30.3 PG — SIGNIFICANT CHANGE UP (ref 27–34)
MCHC RBC-ENTMCNC: 32 GM/DL — SIGNIFICANT CHANGE UP (ref 32–36)
MCV RBC AUTO: 94.8 FL — SIGNIFICANT CHANGE UP (ref 80–100)
NRBC # BLD: 0 /100 WBCS — SIGNIFICANT CHANGE UP (ref 0–0)
PLATELET # BLD AUTO: 314 K/UL — SIGNIFICANT CHANGE UP (ref 150–400)
POTASSIUM SERPL-MCNC: 3.9 MMOL/L — SIGNIFICANT CHANGE UP (ref 3.5–5.3)
POTASSIUM SERPL-SCNC: 3.9 MMOL/L — SIGNIFICANT CHANGE UP (ref 3.5–5.3)
PROTHROM AB SERPL-ACNC: 36.2 SEC — HIGH (ref 9.5–13)
RBC # BLD: 2.9 M/UL — LOW (ref 4.2–5.8)
RBC # FLD: 16 % — HIGH (ref 10.3–14.5)
SODIUM SERPL-SCNC: 138 MMOL/L — SIGNIFICANT CHANGE UP (ref 135–145)
WBC # BLD: 9.85 K/UL — SIGNIFICANT CHANGE UP (ref 3.8–10.5)
WBC # FLD AUTO: 9.85 K/UL — SIGNIFICANT CHANGE UP (ref 3.8–10.5)

## 2024-01-28 PROCEDURE — 99232 SBSQ HOSP IP/OBS MODERATE 35: CPT

## 2024-01-28 RX ORDER — AMLODIPINE BESYLATE 2.5 MG/1
10 TABLET ORAL DAILY
Refills: 0 | Status: DISCONTINUED | OUTPATIENT
Start: 2024-01-28 | End: 2024-01-30

## 2024-01-28 RX ORDER — LOSARTAN POTASSIUM 100 MG/1
100 TABLET, FILM COATED ORAL DAILY
Refills: 0 | Status: DISCONTINUED | OUTPATIENT
Start: 2024-01-28 | End: 2024-01-30

## 2024-01-28 RX ADMIN — LOSARTAN POTASSIUM 100 MILLIGRAM(S): 100 TABLET, FILM COATED ORAL at 20:28

## 2024-01-28 NOTE — PROGRESS NOTE ADULT - ASSESSMENT
89-year-old male PMH of A-fib on Coumadin, chronic Craig, HTN, BPH, recent admission (1/5- 13/2024) for UTI/sepsis, p/w c/o Craig catheter not draining with suprapubic pressure.    Afib (on Coumadin) HTN   - p/w non draining Craig catheter with suprapubic pressure, found to have +UTI and hematuria, ID consulted    - EKG: Afib with SVR,  RBBB (chronic), unchanged from prior , off av brandon blockers   - no anginal complaints  - known hx of Afib on coumadin at home  -follows with Dr Kulkarni   - admitted with supratherapeutic INR up to 8 in the past requiring reversal  - continue to monitor INR: 3.2 today continue to hold coumadin   - can consider switching coumadin to eliquis once bleeding issues have subsided    - bp 164/58- Resume home meds on Losartan and Norvasc with hold parameters  - Monitor and replete Lytes. Keep K > 4 and Mg > 2    - CXR no acute pathology  - TTE (2021) normal LVSF, EF 55%, severe LAE, and mod LVH.  Can repeat routinely  - No evidence of volume overload      - Will continue to follow.

## 2024-01-28 NOTE — PROGRESS NOTE ADULT - SUBJECTIVE AND OBJECTIVE BOX
Patient is a 89y old  Male who presents with a chief complaint of clogged palomino (28 Jan 2024 11:56)      INTERVAL HPI/OVERNIGHT EVENTS: noted  pt seen and examined this am   events noted  feels well  palomino-clear urine      Vital Signs Last 24 Hrs  T(C): 36.7 (28 Jan 2024 11:22), Max: 36.8 (27 Jan 2024 18:57)  T(F): 98.1 (28 Jan 2024 11:22), Max: 98.3 (27 Jan 2024 18:57)  HR: 50 (28 Jan 2024 11:22) (50 - 66)  BP: 138/45 (28 Jan 2024 11:22) (138/45 - 164/58)  BP(mean): --  RR: 17 (28 Jan 2024 11:22) (17 - 18)  SpO2: 97% (28 Jan 2024 11:22) (95% - 98%)    Parameters below as of 28 Jan 2024 11:22  Patient On (Oxygen Delivery Method): room air        amLODIPine   Tablet 10 milliGRAM(s) Oral daily  influenza  Vaccine (HIGH DOSE) 0.7 milliLiter(s) IntraMuscular once  losartan 100 milliGRAM(s) Oral daily      PHYSICAL EXAM:  GENERAL: NAD,   EYES: conjunctiva and sclera clear  ENMT: Moist mucous membranes  NECK: Supple, No JVD, Normal thyroid  CHEST/LUNG: non labored, cta b/l  HEART: Regular rate and rhythm; No murmurs, rubs, or gallops  ABDOMEN: Soft, Nontender, Nondistended; Bowel sounds present  EXTREMITIES:  2+ Peripheral Pulses, No clubbing, cyanosis, or edema  LYMPH: No lymphadenopathy noted  SKIN: No rashes or lesions    Consultant(s) Notes Reviewed:  [x ] YES  [ ] NO  Care Discussed with Consultants/Other Providers [ x] YES  [ ] NO    LABS:                        8.8    9.85  )-----------( 314      ( 28 Jan 2024 06:43 )             27.5     01-28    138  |  109<H>  |  20  ----------------------------<  83  3.9   |  25  |  0.66    Ca    8.3<L>      28 Jan 2024 06:43    TPro  6.8  /  Alb  2.8<L>  /  TBili  0.8  /  DBili  x   /  AST  25  /  ALT  27  /  AlkPhos  115  01-27    PT/INR - ( 28 Jan 2024 06:43 )   PT: 36.2 sec;   INR: 3.20 ratio         PTT - ( 27 Jan 2024 09:15 )  PTT:48.1 sec  Urinalysis Basic - ( 28 Jan 2024 06:43 )    Color: x / Appearance: x / SG: x / pH: x  Gluc: 83 mg/dL / Ketone: x  / Bili: x / Urobili: x   Blood: x / Protein: x / Nitrite: x   Leuk Esterase: x / RBC: x / WBC x   Sq Epi: x / Non Sq Epi: x / Bacteria: x      CAPILLARY BLOOD GLUCOSE            Urinalysis Basic - ( 28 Jan 2024 06:43 )    Color: x / Appearance: x / SG: x / pH: x  Gluc: 83 mg/dL / Ketone: x  / Bili: x / Urobili: x   Blood: x / Protein: x / Nitrite: x   Leuk Esterase: x / RBC: x / WBC x   Sq Epi: x / Non Sq Epi: x / Bacteria: x          RADIOLOGY & ADDITIONAL TESTS:    Imaging Personally Reviewed:  [x ] YES  [ ] NO

## 2024-01-28 NOTE — PROGRESS NOTE ADULT - SUBJECTIVE AND OBJECTIVE BOX
[facility] -- Cayla Orta Pannella, Patel, Savella Goodger, Cohen  Office # [phone]      Follow Up:    Afib   Subjective/Observations:       REVIEW OF SYSTEMS: All other review of systems is negative unless indicated above    PAST MEDICAL & SURGICAL HISTORY:  HTN (hypertension)      Atrial fibrillation      Degeneration macular      History of BPH      Chronic indwelling Craig catheter          MEDICATIONS  (STANDING):  influenza  Vaccine (HIGH DOSE) 0.7 milliLiter(s) IntraMuscular once    MEDICATIONS  (PRN):      Allergies    No Known Allergies    Intolerances        Vital Signs Last 24 Hrs  T(C): 36.8 (2024 04:45), Max: 36.8 (2024 18:57)  T(F): 98.2 (2024 04:45), Max: 98.3 (2024 18:57)  HR: 66 (2024 04:45) (55 - 66)  BP: 164/58 (2024 04:45) (145/56 - 166/64)  BP(mean): --  RR: 17 (2024 04:45) (16 - 18)  SpO2: 95% (2024 04:45) (95% - 98%)    Parameters below as of 2024 04:45  Patient On (Oxygen Delivery Method): room air        I&O's Summary    2024 07:01  -  2024 07:00  --------------------------------------------------------  IN: 0 mL / OUT: 850 mL / NET: -850 mL          PHYSICAL EXAM:  TELE: not on tele   Constitutional: NAD, awake and alert, well-developed  HEENT: Moist Mucous Membranes, Anicteric  Pulmonary: Non-labored, breath sounds are clear bilaterally, No wheezing, crackles or rhonchi  Cardiovascular: Regular, S1 and S2 nl, murmur   Gastrointestinal: Bowel Sounds present, soft, nontender.   Lymph: No lymphadenopathy. No peripheral edema.  Skin: No visible rashes or ulcers.  Psych:  Mood & affect appropriate    LABS: All Labs Reviewed:                        10.4   19.84 )-----------( 378      ( 2024 09:15 )             32.5     2024 06:43    138    |  109    |  20     ----------------------------<  83     3.9     |  25     |  0.66   2024 09:15    135    |  106    |  25     ----------------------------<  101    3.9     |  24     |  0.88     Ca    8.3        2024 06:43  Ca    8.9        2024 09:15    TPro  6.8    /  Alb  2.8    /  TBili  0.8    /  DBili  x      /  AST       /  ALT       /  AlkPhos  115    2024 09:15    PT/INR - ( 2024 06:43 )   PT: 36.2 sec;   INR: 3.20 ratio         PTT - ( 2024 09:15 )  PTT:48.1 sec  CARDIAC MARKERS ( 2024 14:30 )  x     / x     / 36 U/L / x     / x             EC Lead ECG:   Ventricular Rate 55 BPM    QRS Duration 142 ms    Q-T Interval 510 ms    QTC Calculation(Bazett) 487 ms    R Axis 4 degrees    T Axis 41 degrees    Diagnosis Line Atrial fibrillation with slow ventricular response  Right bundle branch block  Abnormal ECG  Confirmed by anne Geller ([id number]) on 2024 11:33:39 AM (24 @ 10:30)        Radiology:         [facility] -- Cayla Orta Pannella, Patel, Savella Goodger, Cohen  Office # [phone]      Follow Up:    Afib   Subjective/Observations:   Seen bedside, eating breakfast in no acute distress  denies chest pain dizziness palpitations   remains on room air       REVIEW OF SYSTEMS: All other review of systems is negative unless indicated above    PAST MEDICAL & SURGICAL HISTORY:  HTN (hypertension)      Atrial fibrillation      Degeneration macular      History of BPH      Chronic indwelling Craig catheter          MEDICATIONS  (STANDING):  influenza  Vaccine (HIGH DOSE) 0.7 milliLiter(s) IntraMuscular once    MEDICATIONS  (PRN):      Allergies    No Known Allergies    Intolerances        Vital Signs Last 24 Hrs  T(C): 36.8 (2024 04:45), Max: 36.8 (2024 18:57)  T(F): 98.2 (2024 04:45), Max: 98.3 (2024 18:57)  HR: 66 (2024 04:45) (55 - 66)  BP: 164/58 (2024 04:45) (145/56 - 166/64)  BP(mean): --  RR: 17 (2024 04:45) (16 - 18)  SpO2: 95% (2024 04:45) (95% - 98%)    Parameters below as of 2024 04:45  Patient On (Oxygen Delivery Method): room air        I&O's Summary    2024 07:01  -  2024 07:00  --------------------------------------------------------  IN: 0 mL / OUT: 850 mL / NET: -850 mL          PHYSICAL EXAM:  TELE: not on tele   Constitutional: NAD, awake and alert, well-developed  HEENT: Moist Mucous Membranes, Anicteric  Pulmonary: Non-labored, breath sounds are clear bilaterally, No wheezing, crackles or rhonchi  Cardiovascular: Regular, S1 and S2 nl, murmur   Gastrointestinal: Bowel Sounds present, soft, nontender.   Lymph: No lymphadenopathy. No peripheral edema.  Skin: No visible rashes or ulcers.  Psych:  Mood & affect appropriate    LABS: All Labs Reviewed:                        10.4   19.84 )-----------( 378      ( 2024 09:15 )             32.5     2024 06:43    138    |  109    |  20     ----------------------------<  83     3.9     |  25     |  0.66   2024 09:15    135    |  106    |  25     ----------------------------<  101    3.9     |  24     |  0.88     Ca    8.3        2024 06:43  Ca    8.9        2024 09:15    TPro  6.8    /  Alb  2.8    /  TBili  0.8    /  DBili  x      /  AST  25     /  ALT  27     /  AlkPhos  115    2024 09:15    PT/INR - ( 2024 06:43 )   PT: 36.2 sec;   INR: 3.20 ratio         PTT - ( 2024 09:15 )  PTT:48.1 sec  CARDIAC MARKERS ( 2024 14:30 )  x     / x     / 36 U/L / x     / x             EC Lead ECG:   Ventricular Rate 55 BPM    QRS Duration 142 ms    Q-T Interval 510 ms    QTC Calculation(Bazett) 487 ms    R Axis 4 degrees    T Axis 41 degrees    Diagnosis Line Atrial fibrillation with slow ventricular response  Right bundle branch block  Abnormal ECG  Confirmed by anne Geller ([id number]) on 2024 11:33:39 AM (24 @ 10:30)        Radiology:

## 2024-01-28 NOTE — PROGRESS NOTE ADULT - SUBJECTIVE AND OBJECTIVE BOX
[facility]  Bin Garcia MD PhD, Brooklyn Amin MD, Denise Sarabia MD, Grady Rico MD, Cyrus Espinoza MD  and providing coverage with Stefany Qureshi MD  Providing Infectious Disease Consultations at [facility], [facility], [place], [place], [facility], [facility]'s    Office# [phone] to schedule follow up appointments  Answering Service for urgent calls or New Consults [phone]  Cell# to text for urgent issues Bin Garcia [phone]     infectious diseases progress note:    GURINDER TURNER is a 89y y. o. Male patient    Overnight and events of the last 24hrs reviewed    Allergies    No Known Allergies    Intolerances        ANTIBIOTICS/RELEVANT:  antimicrobials    immunologic:  influenza  Vaccine (HIGH DOSE) 0.7 milliLiter(s) IntraMuscular once    OTHER:  amLODIPine   Tablet 10 milliGRAM(s) Oral daily  losartan 100 milliGRAM(s) Oral daily      Objective:  Vital Signs Last 24 Hrs  T(C): 36.7 (28 Jan 2024 11:22), Max: 36.8 (27 Jan 2024 18:57)  T(F): 98.1 (28 Jan 2024 11:22), Max: 98.3 (27 Jan 2024 18:57)  HR: 50 (28 Jan 2024 11:22) (50 - 66)  BP: 138/45 (28 Jan 2024 11:22) (138/45 - 164/58)  BP(mean): --  RR: 17 (28 Jan 2024 11:22) (17 - 18)  SpO2: 97% (28 Jan 2024 11:22) (95% - 98%)    Parameters below as of 28 Jan 2024 11:22  Patient On (Oxygen Delivery Method): room air        T(C): 36.7 (01-28-24 @ 11:22), Max: 36.8 (01-27-24 @ 18:57)  T(C): 36.7 (01-28-24 @ 11:22), Max: 36.8 (01-27-24 @ 18:57)  T(C): 36.7 (01-28-24 @ 11:22), Max: 36.8 (01-27-24 @ 18:57)    PHYSICAL EXAM:  HEENT: NC atraumatic  Neck: supple  Respiratory: no accessory muscle use, breathing comfortably  Cardiovascular: distant  Gastrointestinal: normal appearing, nondistended  Extremities: no clubbing, no cyanosis,        LABS:                          8.8    9.85  )-----------( 314      ( 28 Jan 2024 06:43 )             27.5       WBC  9.85 01-28 @ 06:43  19.84 01-27 @ 09:15      01-28    138  |  109<H>  |  20  ----------------------------<  83  3.9   |  25  |  0.66    Ca    8.3<L>      28 Jan 2024 06:43    TPro  6.8  /  Alb  2.8<L>  /  TBili  0.8  /  DBili  x   /  AST  25  /  ALT  27  /  AlkPhos  115  01-27      Creatinine: 0.66 mg/dL (01-28-24 @ 06:43)  Creatinine: 0.88 mg/dL (01-27-24 @ 09:15)      PT/INR - ( 28 Jan 2024 06:43 )   PT: 36.2 sec;   INR: 3.20 ratio         PTT - ( 27 Jan 2024 09:15 )  PTT:48.1 sec  Urinalysis Basic - ( 28 Jan 2024 06:43 )    Color: x / Appearance: x / SG: x / pH: x  Gluc: 83 mg/dL / Ketone: x  / Bili: x / Urobili: x   Blood: x / Protein: x / Nitrite: x   Leuk Esterase: x / RBC: x / WBC x   Sq Epi: x / Non Sq Epi: x / Bacteria: x            INFLAMMATORY MARKERS      MICROBIOLOGY:              RADIOLOGY & ADDITIONAL STUDIES:

## 2024-01-28 NOTE — PHYSICAL THERAPY INITIAL EVALUATION ADULT - PERTINENT HX OF CURRENT PROBLEM, REHAB EVAL
Pt is a 89-year-old male PMH of A-fib on Coumadin, chronic indwelling Palomino, HTN, BPH, recent admission 1/5 through 1/13/2024 for UTI/sepsis now admitted with urinary retention dt clogged palomino and leucocytosis.

## 2024-01-28 NOTE — PROGRESS NOTE ADULT - ASSESSMENT
89-year-old male PMH of A-fib on Coumadin, chronic Craig, HTN, BPH, recent admission 1/5 through 1/13/2024.  Patient was admitted for UTI/sepsis, presenting to the emergency department by ambulance from home with report of Craig catheter not draining, patient complaining of suprapubic pressure. Pt reports yesterday his lower abd became hard as a rock but now pressure relieved. Anxious to get home tomorrow prior to start of the football games.    RECOMMENDATIONS  1-Urinary retention/?UTI pt with symptoms limited to those associated with retention and noted hematuria, pt afebrile, leukocytosis, dose of ceftriaxone given and rapid drop in WBC  low suspicion for UTI so rec no further abx and    From an ID standpoint no further requirement for inpatient status for the management of ID issues. Fine with discharge from ID standpoint when other medical issues no longer require inpatient care and social issues allow for a safe discharge plan. To schedule an outpatient ID follow up appointment please call our office at [phone]    Thank you for consulting us and involving us in the management of this most interesting and challenging case.  Please call us at [phone] or text me directly on my cell#[phone] with any concerns or further questions.

## 2024-01-28 NOTE — PHYSICAL THERAPY INITIAL EVALUATION ADULT - RANGE OF MOTION EXAMINATION, REHAB EVAL
L knee flexion limited due to stiffness/bilateral upper extremity ROM was WFL (within functional limits)/bilateral lower extremity ROM was WFL (within functional limits)/deficits as listed below

## 2024-01-28 NOTE — PHYSICAL THERAPY INITIAL EVALUATION ADULT - GAIT TRAINING, PT EVAL
Pt will ambulate 500ft with least restrictive device with min/mod A x1 for safe home mobility in 4 sessions.

## 2024-01-28 NOTE — PROGRESS NOTE ADULT - ASSESSMENT
89-year-old male PMH of A-fib on Coumadin, chronic indwelling Palomino, HTN, BPH, recent admission 1/5 through 1/13/2024 for UTI/sepsis now admitted with urinary retention dt clogged palomino, leucocytosis  r/o UTI    #urinary retention dt clogged palomino  r/o UTI sp ceftriaxone  in ED  UA cw chronic palomino , fu ucx  ID cx noted- observe off abx  BPH hx-resume flomax, proscar    #leucocytosis- monitor trend  improved    #hematuria- admission UA- no blood  palomino replaced in ED- bag with bloody urine  likely traumatic palomino- now cleared  monitor hb- slight drop- trend- no e/o active bleeding    #Afib on coumadin- INR 2.8  will hold off on dosing coumadin inr>3  d/w Cards - evaluating pt for Eliquis    #HTN- resume home meds    #DVT ppx- Inr therapeutic      [facility]   [phone]

## 2024-01-29 ENCOUNTER — LABORATORY RESULT (OUTPATIENT)
Age: 89
End: 2024-01-29

## 2024-01-29 LAB
ANION GAP SERPL CALC-SCNC: 6 MMOL/L — SIGNIFICANT CHANGE UP (ref 5–17)
BUN SERPL-MCNC: 25 MG/DL — HIGH (ref 7–23)
CALCIUM SERPL-MCNC: 8.5 MG/DL — SIGNIFICANT CHANGE UP (ref 8.5–10.1)
CHLORIDE SERPL-SCNC: 109 MMOL/L — HIGH (ref 96–108)
CO2 SERPL-SCNC: 24 MMOL/L — SIGNIFICANT CHANGE UP (ref 22–31)
CREAT SERPL-MCNC: 0.71 MG/DL — SIGNIFICANT CHANGE UP (ref 0.5–1.3)
EGFR: 86 ML/MIN/1.73M2 — SIGNIFICANT CHANGE UP
GLUCOSE SERPL-MCNC: 81 MG/DL — SIGNIFICANT CHANGE UP (ref 70–99)
HCT VFR BLD CALC: 27.5 % — LOW (ref 39–50)
HGB BLD-MCNC: 8.8 G/DL — LOW (ref 13–17)
INR BLD: 2.02 RATIO — HIGH (ref 0.85–1.18)
MCHC RBC-ENTMCNC: 29.9 PG — SIGNIFICANT CHANGE UP (ref 27–34)
MCHC RBC-ENTMCNC: 32 GM/DL — SIGNIFICANT CHANGE UP (ref 32–36)
MCV RBC AUTO: 93.5 FL — SIGNIFICANT CHANGE UP (ref 80–100)
NRBC # BLD: 0 /100 WBCS — SIGNIFICANT CHANGE UP (ref 0–0)
PLATELET # BLD AUTO: 305 K/UL — SIGNIFICANT CHANGE UP (ref 150–400)
POTASSIUM SERPL-MCNC: 3.7 MMOL/L — SIGNIFICANT CHANGE UP (ref 3.5–5.3)
POTASSIUM SERPL-SCNC: 3.7 MMOL/L — SIGNIFICANT CHANGE UP (ref 3.5–5.3)
PROTHROM AB SERPL-ACNC: 23.2 SEC — HIGH (ref 9.5–13)
RBC # BLD: 2.94 M/UL — LOW (ref 4.2–5.8)
RBC # FLD: 16 % — HIGH (ref 10.3–14.5)
SODIUM SERPL-SCNC: 139 MMOL/L — SIGNIFICANT CHANGE UP (ref 135–145)
WBC # BLD: 8.71 K/UL — SIGNIFICANT CHANGE UP (ref 3.8–10.5)
WBC # FLD AUTO: 8.71 K/UL — SIGNIFICANT CHANGE UP (ref 3.8–10.5)

## 2024-01-29 PROCEDURE — 99232 SBSQ HOSP IP/OBS MODERATE 35: CPT

## 2024-01-29 RX ORDER — APIXABAN 2.5 MG/1
5 TABLET, FILM COATED ORAL
Refills: 0 | Status: DISCONTINUED | OUTPATIENT
Start: 2024-01-29 | End: 2024-01-30

## 2024-01-29 RX ADMIN — APIXABAN 5 MILLIGRAM(S): 2.5 TABLET, FILM COATED ORAL at 18:08

## 2024-01-29 RX ADMIN — LOSARTAN POTASSIUM 100 MILLIGRAM(S): 100 TABLET, FILM COATED ORAL at 18:07

## 2024-01-29 NOTE — CARE COORDINATION ASSESSMENT. - OTHER PERTINENT DISCHARGE PLANNING INFORMATION:
Met patient at bedside.  Explained role of CM, verbalized understanding. Pt was made aware a CM will remain available through hospitalization.  Contact information given in discharge/ transitions resource folder. CM met with pt who was a/0x3 but stated he "doesn't remember some things" and referred CM to speak with Shilpa Penaloza [phone]. CM left message and awaiting call back. CM obtained information from chart. pt  lives with spouse and has aides through [facility] [phone] Gwendolyn Mars CM 12h daily. CM left message for Gwendolyn with callback information. Pt recently discharged with [facility] services from [facility] [phone] pt has ramp, hospital bed, WC, rolling walker, shower chair. Pt will need transportation home when medically ready for DC.

## 2024-01-29 NOTE — CASE MANAGEMENT PROGRESS NOTE - NSCMPROGRESSNOTE_GEN_ALL_CORE
CM consult for managing medications at home at home noted and reviewed. CM to remain available and monitor for home care needs

## 2024-01-29 NOTE — CARE COORDINATION ASSESSMENT. - NSPASTMEDSURGHISTORY_GEN_ALL_CORE_FT
PAST MEDICAL & SURGICAL HISTORY:  Degeneration macular      Atrial fibrillation      HTN (hypertension)      Chronic indwelling Craig catheter      History of BPH

## 2024-01-29 NOTE — CARE COORDINATION ASSESSMENT. - NSCAREPROVIDERS_GEN_ALL_CORE_FT
CARE PROVIDERS:  Accepting Physician: Adriana Guajardo  Administration: Humberto Baptiste  Administration: Yariel Jennings  Admitting: Adriana Guajardo  Attending: Adriana Guajardo  Case Management: Ivan Lopez  Consultant: Mariangel Gilbert  Consultant: Maryjane Saldivar  Consultant: Bin Garcia  Consultant: Twin Geller  Consultant: Rachel Lopez  ED Attending: Rahul Kramer  ED Nurse: Adriana Del Valle  Nurse: Justa Mendoza  Ordered: ADM, User  Outpatient Provider: Adriana Guajardo  Outpatient Provider: Bin Kulkarni  Outpatient Provider: Gabriel Comer  Override: Radha Aguilar  PCA/Nursing Assistant: Nila Apodaca  Primary Team: Ruby Addison  Primary Team: Victorina Mcclellan  Primary Team: Lorena Cruz  Primary Team: Marcela Chu  Registered Dietitian: Akanksha Villegas  : Nancie Santana  Student: Kay Valdez// Supp. Assoc.: Sera Morelos  Wound Care Specialist Nurse: Julia Smalls

## 2024-01-29 NOTE — ADVANCED PRACTICE NURSE CONSULT - RECOMMEDATIONS
Evolving DTI sacrum  --> Cleanse with NS and pat dry. Paint with Cavilon. Cover with TRIAD. BID/PRN soiling. Do not scrub to remove. Leave a thin layer when cleansing and reapply. If you need more TRIAD, please contact the pharmacy.    Prevention  --> Moisturize skin with Sween24 daily after bathing.  --> Continue turning and positioning q2h and utilize offloading devices as per protocol.  --> Avoid use of diapers and continue with Attends pads and daily/PRN pericare for soiling.  --> Waffle cushion when OOB to chair.  --> Nutrition Consult for optimization in pt w/ increased nutritional needs.  --> Encourage high quality protein, Reynaldo/Prosource, MVI & Vit C to promote wound healing.

## 2024-01-29 NOTE — DIETITIAN INITIAL EVALUATION ADULT - PERTINENT LABORATORY DATA
01-29    139  |  109<H>  |  25<H>  ----------------------------<  81  3.7   |  24  |  0.71    Ca    8.5      29 Jan 2024 05:30

## 2024-01-29 NOTE — PROGRESS NOTE ADULT - SUBJECTIVE AND OBJECTIVE BOX
Patient is a 89y old  Male who presents with a chief complaint of clogged palomino (29 Jan 2024 15:32)      INTERVAL HPI/OVERNIGHT EVENTS: noted  pt seen and examined this am   events noted  feels well      Vital Signs Last 24 Hrs  T(C): 36.4 (29 Jan 2024 11:41), Max: 36.7 (28 Jan 2024 20:39)  T(F): 97.6 (29 Jan 2024 11:41), Max: 98 (28 Jan 2024 20:39)  HR: 53 (29 Jan 2024 11:41) (42 - 53)  BP: 156/67 (29 Jan 2024 11:41) (156/62 - 177/57)  BP(mean): --  RR: 97 (29 Jan 2024 11:41) (17 - 97)  SpO2: 96% (29 Jan 2024 05:14) (96% - 96%)    Parameters below as of 29 Jan 2024 11:41  Patient On (Oxygen Delivery Method): room air        amLODIPine   Tablet 10 milliGRAM(s) Oral daily  influenza  Vaccine (HIGH DOSE) 0.7 milliLiter(s) IntraMuscular once  losartan 100 milliGRAM(s) Oral daily      PHYSICAL EXAM:  GENERAL: NAD,   EYES: conjunctiva and sclera clear  ENMT: Moist mucous membranes  NECK: Supple, No JVD, Normal thyroid  CHEST/LUNG: non labored, cta b/l  HEART: Regular rate and rhythm; No murmurs, rubs, or gallops  ABDOMEN: Soft, Nontender, Nondistended; Bowel sounds present  EXTREMITIES:  2+ Peripheral Pulses, No clubbing, cyanosis, or edema  LYMPH: No lymphadenopathy noted  SKIN: No rashes or lesions    Consultant(s) Notes Reviewed:  [x ] YES  [ ] NO  Care Discussed with Consultants/Other Providers [ x] YES  [ ] NO    LABS:                        8.8    8.71  )-----------( 305      ( 29 Jan 2024 05:30 )             27.5     01-29    139  |  109<H>  |  25<H>  ----------------------------<  81  3.7   |  24  |  0.71    Ca    8.5      29 Jan 2024 05:30      PT/INR - ( 29 Jan 2024 05:30 )   PT: 23.2 sec;   INR: 2.02 ratio           Urinalysis Basic - ( 29 Jan 2024 05:30 )    Color: x / Appearance: x / SG: x / pH: x  Gluc: 81 mg/dL / Ketone: x  / Bili: x / Urobili: x   Blood: x / Protein: x / Nitrite: x   Leuk Esterase: x / RBC: x / WBC x   Sq Epi: x / Non Sq Epi: x / Bacteria: x      CAPILLARY BLOOD GLUCOSE            Urinalysis Basic - ( 29 Jan 2024 05:30 )    Color: x / Appearance: x / SG: x / pH: x  Gluc: 81 mg/dL / Ketone: x  / Bili: x / Urobili: x   Blood: x / Protein: x / Nitrite: x   Leuk Esterase: x / RBC: x / WBC x   Sq Epi: x / Non Sq Epi: x / Bacteria: x        Culture - Blood (collected 27 Jan 2024 10:05)  Source: .Blood Blood-Peripheral  Preliminary Report (29 Jan 2024 17:01):    No growth at 48 Hours    Culture - Blood (collected 27 Jan 2024 10:05)  Source: .Blood Blood-Peripheral  Preliminary Report (29 Jan 2024 17:01):    No growth at 48 Hours    Culture - Urine (collected 27 Jan 2024 09:15)  Source: Catheterized Catheterized  Preliminary Report (29 Jan 2024 17:21):    >100,000 CFU/ml Proteus mirabilis        RADIOLOGY & ADDITIONAL TESTS:    Imaging Personally Reviewed:  [x ] YES  [ ] NO

## 2024-01-29 NOTE — PROGRESS NOTE ADULT - NUTRITIONAL ASSESSMENT
This patient has been assessed with a concern for Malnutrition and has been determined to have a diagnosis/diagnoses of Moderate protein-calorie malnutrition.    This patient is being managed with:   Diet DASH/TLC-  Sodium & Cholesterol Restricted  Supplement Feeding Modality:  Oral  Ensure Plus High Protein Cans or Servings Per Day:  1       Frequency:  Two Times a day  Entered: Jan 27 2024  2:22PM

## 2024-01-29 NOTE — CAREGIVER ENGAGEMENT NOTE - CAREGIVER OUTREACH NOTES - FREE TEXT
Met patient at bedside who gave consent to speak with caregiver. Pt was alert and oriented to person, place, and year. Pt stated he would like CM to contact Shilpa as she takes care of him. CM called and left message, awaiting return call

## 2024-01-29 NOTE — PROGRESS NOTE ADULT - SUBJECTIVE AND OBJECTIVE BOX
[facility]FECTIOUS [facility]  Bin Garcia MD PhD, Brooklyn Amin MD, Denise Sarabia MD, Grady Rico MD, Cyrus Espinoza MD  and providing coverage with Stefany Qureshi MD  Providing Infectious Disease Consultations at [facility], [facility], [place], [place], [facility], [facility]'s    Office# [phone] to schedule follow up appointments  Answering Service for urgent calls or New Consults [phone]  Cell# to text for urgent issues Bin Garcia [phone]     infectious diseases progress note:    GURINDER TURNER is a 89y y. o. Male patient    Overnight and events of the last 24hrs reviewed    Allergies    No Known Allergies    Intolerances        ANTIBIOTICS/RELEVANT:  antimicrobials    immunologic:  influenza  Vaccine (HIGH DOSE) 0.7 milliLiter(s) IntraMuscular once    OTHER:  amLODIPine   Tablet 10 milliGRAM(s) Oral daily  losartan 100 milliGRAM(s) Oral daily      Objective:  Vital Signs Last 24 Hrs  T(C): 36.4 (29 Jan 2024 11:41), Max: 36.7 (28 Jan 2024 20:39)  T(F): 97.6 (29 Jan 2024 11:41), Max: 98 (28 Jan 2024 20:39)  HR: 53 (29 Jan 2024 11:41) (42 - 53)  BP: 156/67 (29 Jan 2024 11:41) (156/62 - 177/57)  BP(mean): --  RR: 97 (29 Jan 2024 11:41) (17 - 97)  SpO2: 96% (29 Jan 2024 05:14) (96% - 96%)    Parameters below as of 29 Jan 2024 11:41  Patient On (Oxygen Delivery Method): room air        T(C): 36.4 (01-29-24 @ 11:41), Max: 36.8 (01-27-24 @ 18:57)  T(C): 36.4 (01-29-24 @ 11:41), Max: 36.8 (01-27-24 @ 18:57)  T(C): 36.4 (01-29-24 @ 11:41), Max: 36.8 (01-27-24 @ 18:57)    PHYSICAL EXAM:  HEENT: NC atraumatic  Neck: supple  Respiratory: no accessory muscle use, breathing comfortably  Cardiovascular: distant  Gastrointestinal: normal appearing, nondistended  Extremities: no clubbing, no cyanosis,        LABS:                          8.8    8.71  )-----------( 305      ( 29 Jan 2024 05:30 )             27.5       WBC  8.71 01-29 @ 05:30  9.85 01-28 @ 06:43  19.84 01-27 @ 09:15      01-29    139  |  109<H>  |  25<H>  ----------------------------<  81  3.7   |  24  |  0.71    Ca    8.5      29 Jan 2024 05:30        Creatinine: 0.71 mg/dL (01-29-24 @ 05:30)  Creatinine: 0.66 mg/dL (01-28-24 @ 06:43)  Creatinine: 0.88 mg/dL (01-27-24 @ 09:15)      PT/INR - ( 29 Jan 2024 05:30 )   PT: 23.2 sec;   INR: 2.02 ratio           Urinalysis Basic - ( 29 Jan 2024 05:30 )    Color: x / Appearance: x / SG: x / pH: x  Gluc: 81 mg/dL / Ketone: x  / Bili: x / Urobili: x   Blood: x / Protein: x / Nitrite: x   Leuk Esterase: x / RBC: x / WBC x   Sq Epi: x / Non Sq Epi: x / Bacteria: x            INFLAMMATORY MARKERS      MICROBIOLOGY:              RADIOLOGY & ADDITIONAL STUDIES:

## 2024-01-29 NOTE — PROGRESS NOTE ADULT - ASSESSMENT
89-year-old male PMH of A-fib on Coumadin, chronic indwelling Palomino, HTN, BPH, recent admission 1/5 through 1/13/2024 for UTI/sepsis now admitted with urinary retention dt clogged palomino, leucocytosis  r/o UTI    #urinary retention dt clogged palomino  r/o UTI sp ceftriaxone  in ED  UA cw chronic palomino , fu ucx  ID cx noted- observe off abx  BPH hx-resume flomax, proscar    #leucocytosis- monitor trend  improved    #hematuria- admission UA- no blood  palomino replaced in ED- bag with bloody urine  likely traumatic palomino- now cleared  monitor hb- slight drop- trend- no e/o active bleeding    #Afib on coumadin- INR 2.8  will hold off on dosing coumadin inr>3  d/w Cards - evaluating pt for Eliquis  will start eliquis    #HTN- resume home meds    #DVT ppx- Inr therapeutic    dc planning home with home care      [facility]   [phone]

## 2024-01-29 NOTE — ADVANCED PRACTICE NURSE CONSULT - ASSESSMENT
This is a 89-year-old male PMH of A-fib on Coumadin, chronic Craig, HTN, BPH, recent admission 1/5 through 1/13/2024.  Patient was admitted for UTI/sepsis, presenting to the emergency department by ambulance from home with report of Craig catheter not draining, patient complaining of suprapubic pressure.    Upon arrival, patient resting comfortably in the bed. Permission granted for a skin assessment. Patient able to turn and position to his side with moderate assistance. +Craig. Patient able to report if he has to have a BM.    Sacrum with localized area of nonblanchable, dark maroon/purplish discoloration with partial thickness skin loss. Entire area measures 4cm x 9cm x 0.2cm. Patient reports having a wound there for some time. Scant serosanguinous drainage. No warmth, induration, or fluctuance noted. Mild tenderness to palpation. Cleansed with NS and pat dry. Painted with Cavilon and covered with Critic-Aid Clear. Patient offloaded to the opposite side with a pillow.

## 2024-01-29 NOTE — PROGRESS NOTE ADULT - ASSESSMENT
89-year-old male PMH of A-fib on Coumadin, chronic Craig, HTN, BPH, recent admission 1/5 through 1/13/2024.  Patient was admitted for UTI/sepsis, presenting to the emergency department by ambulance from home with report of Craig catheter not draining, patient complaining of suprapubic pressure. Pt reports yesterday his lower abd became hard as a rock but now pressure relieved. Anxious to get home tomorrow prior to start of the football games.    RECOMMENDATIONS  1-Urinary retention/?UTI pt with symptoms limited to those associated with retention and noted hematuria, pt afebrile, leukocytosis, dose of ceftriaxone given and rapid drop in WBC  low suspicion for UTI so rec no further abx and    From an ID standpoint no further requirement for inpatient status for the management of ID issues. Fine with discharge from ID standpoint when other medical issues no longer require inpatient care and social issues allow for a safe discharge plan. To schedule an outpatient ID follow up appointment please call our office at [phone]    Thank you for consulting us and involving us in the management of this most interesting and challenging case.  Please call us at [phone] or text me directly on my cell#[phone] with any concerns or further questions.

## 2024-01-29 NOTE — PATIENT CHOICE NOTE. - NSPTCHOICENOTES_GEN_ALL_CORE
D/C resource folder provided at bedside this includes lists for both rehab facilities and home care agencies CM met with pt who was a/0x3 but stated he "doesn't remember some things" and referred CM to speak with Shilpa Penaloza [phone]. Pt recently discharged with [facility] services from [facility] [phone]

## 2024-01-29 NOTE — DIETITIAN INITIAL EVALUATION ADULT - ORAL INTAKE PTA/DIET HISTORY
patient seen sleeping. per RN patient ate well this AM. RD spoke to family on telephone. patient with good PO PTA with poor dentition. eats small cut up meats. soft foods. over 5 years weight loss per family. (phone disconnected unable to reach family after brief interview on phone) no food allergies.   patient always eats well per family but when with UTI with decreased PO intake noted  education deferred at this time

## 2024-01-29 NOTE — DIETITIAN INITIAL EVALUATION ADULT - PERTINENT MEDS FT
MEDICATIONS  (STANDING):  amLODIPine   Tablet 10 milliGRAM(s) Oral daily  influenza  Vaccine (HIGH DOSE) 0.7 milliLiter(s) IntraMuscular once  losartan 100 milliGRAM(s) Oral daily    MEDICATIONS  (PRN):

## 2024-01-29 NOTE — CASE MANAGEMENT PROGRESS NOTE - NSCMPROGRESSNOTE_GEN_ALL_CORE
KHUSHBOO called [facility] [phone] Gwendolyn Mars CM. KHUSHBOO left message for Gwendolyn with callback information.

## 2024-01-29 NOTE — DIETITIAN INITIAL EVALUATION ADULT - OTHER INFO
· Birth Sex	Male  · Patient's Preferred Pronoun	Him/He    Reason for Admission: clogged palomino  History of Present Illness:   89-year-old male PMH of A-fib on Coumadin, chronic Palomino, HTN, BPH, recent admission 1/5 through 1/13/2024.  Patient was admitted for UTI/sepsis, presenting to the emergency department by ambulance from home with report of Palomino catheter not draining, patient complaining of suprapubic pressure.  IN ED palomnio replaced with good urine flow and relief of suprapubic pressure  palomino bag - noted- bloody urine  140# weight this admit unspecified amount of weight loss over 5 years reported  1/29 fecal incontinence

## 2024-01-29 NOTE — PROGRESS NOTE ADULT - SUBJECTIVE AND OBJECTIVE BOX
[facility] -- Cayla Orta, Andriy, Jacky, Duyen, Onel King: Office # [phone]    Follow Up: A suki, on AC      Subjective/Observations: Patient awake, alert, resting in bed. Denies chest pain, palpitations and dizziness. Denies any difficulty breathing. No orthopnea and PND. Tolerating room air.     REVIEW OF SYSTEMS: All other review of systems are negative unless indicated above    PAST MEDICAL & SURGICAL HISTORY:  HTN (hypertension)      Atrial fibrillation      Degeneration macular      History of BPH      Chronic indwelling Craig catheter    MEDICATIONS  (STANDING):  amLODIPine   Tablet 10 milliGRAM(s) Oral daily  influenza  Vaccine (HIGH DOSE) 0.7 milliLiter(s) IntraMuscular once  losartan 100 milliGRAM(s) Oral daily    MEDICATIONS  (PRN):    Allergies  No Known Allergies    Vital Signs Last 24 Hrs  T(C): 36.4 (29 Jan 2024 05:14), Max: 36.7 (28 Jan 2024 11:22)  T(F): 97.5 (29 Jan 2024 05:14), Max: 98.1 (28 Jan 2024 11:22)  HR: 50 (29 Jan 2024 05:47) (42 - 50)  BP: 156/62 (29 Jan 2024 05:47) (138/45 - 177/57)  BP(mean): --  RR: 18 (29 Jan 2024 05:14) (17 - 18)  SpO2: 96% (29 Jan 2024 05:14) (96% - 97%)    Parameters below as of 29 Jan 2024 05:14  Patient On (Oxygen Delivery Method): room air      I&O's Summary        TELE: Not on telemetry   PHYSICAL EXAM:  Constitutional: NAD, awake and alert  HEENT: Moist Mucous Membranes, Anicteric  Pulmonary: Non-labored, breath sounds are clear bilaterally, No wheezing, rales or rhonchi  Cardiovascular: Regular, S1 and S2, No murmurs, No rubs, gallops or clicks  Gastrointestinal:  soft, nontender, nondistended   Lymph: No peripheral edema. No lymphadenopathy.   Skin: No visible rashes or ulcers.  Psych:  Mood & affect appropriate      LABS: All Labs Reviewed:                        8.8    8.71  )-----------( 305      ( 29 Jan 2024 05:30 )             27.5                         8.8    9.85  )-----------( 314      ( 28 Jan 2024 06:43 )             27.5                         10.4   19.84 )-----------( 378      ( 27 Jan 2024 09:15 )             32.5     29 Jan 2024 05:30    139    |  109    |  25     ----------------------------<  81     3.7     |  24     |  0.71   28 Jan 2024 06:43    138    |  109    |  20     ----------------------------<  83     3.9     |  25     |  0.66   27 Jan 2024 09:15    135    |  106    |  25     ----------------------------<  101    3.9     |  24     |  0.88     Ca    8.5        29 Jan 2024 05:30  Ca    8.3        28 Jan 2024 06:43  Ca    8.9        27 Jan 2024 09:15    TPro  6.8    /  Alb  2.8    /  TBili  0.8    /  DBili  x      /  AST  25     /  ALT  27     /  AlkPhos  115    27 Jan 2024 09:15   LIVER FUNCTIONS - ( 27 Jan 2024 09:15 )  Alb: 2.8 g/dL / Pro: 6.8 g/dL / ALK PHOS: 115 U/L / ALT: 27 U/L / AST: 25 U/L / GGT: x           PT/INR - ( 29 Jan 2024 05:30 )   PT: 23.2 sec;   INR: 2.02 ratio         PTT - ( 27 Jan 2024 09:15 )  PTT:48.1 secCreatine Kinase, Serum: 36 U/L (01-27-24 @ 14:30)  Lactate, Blood: 1.2 mmol/L (01-27-24 @ 10:05)    12 Lead ECG:   Ventricular Rate 55 BPM    QRS Duration 142 ms    Q-T Interval 510 ms    QTC Calculation(Bazett) 487 ms    R Axis 4 degrees    T Axis 41 degrees    Diagnosis Line Atrial fibrillation with slow ventricular response  Right bundle branch block  Abnormal ECG  Confirmed by anne Geller ([id number]) on 1/27/2024 11:33:39 AM (01-27-24 @ 10:30)

## 2024-01-29 NOTE — PROGRESS NOTE ADULT - ASSESSMENT
89-year-old male PMH of A-fib on Coumadin, chronic Craig, HTN, BPH, recent admission (1/5- 13/2024) for UTI/sepsis, p/w c/o Craig catheter not draining with suprapubic pressure, Follows with Dr Kulkarni     Afib (on Coumadin) HTN   - p/w non draining Craig catheter with suprapubic pressure, found to have +UTI and hematuria, ID consulted    - EKG: Afib with SVR,  RBBB (chronic), unchanged from prior , off av brandon blockers   - No evidence of any active ischemia     - known hx of Afib on coumadin at home  - admitted with supra therapeutic INR up to 8 in the past requiring reversal  - continue to monitor INR: 3.2 today continue to hold coumadin   - can consider switching coumadin to eliquis once bleeding issues have subsided    - bp 164/58- Resume home meds on Losartan and Norvasc with hold parameters  - Monitor and replete Lytes. Keep K > 4 and Mg > 2    - CXR no acute pathology  - TTE (2021) normal LVSF, EF 55%, severe LAE, and mod LVH.  Can repeat routinely  - No evidence of volume overload    - Monitor and replete lytes, keep K>4, Mg>2.  - Will continue to follow.    Rachel Lopez, MS FNP, AGACNP  Nurse Practitioner- Cardiology   Please call on TEAMS     89-year-old male PMH of A-fib on Coumadin, chronic Craig, HTN, BPH, recent admission (1/5- 13/2024) for UTI/sepsis, p/w c/o Craig catheter not draining with suprapubic pressure, Follows with Dr Kulkarni.     Afib (on Coumadin) HTN   - p/w non draining Craig catheter with suprapubic pressure, found to have +UTI and hematuria, ID consulted  - Known hx of Afib on coumadin at home  - INR trend: <--2.02, <--3.20, <--2.88  - Can consider switching coumadin to Eliquis once bleeding issues have subsided    - EKG: Afib with SVR,  RBBB (chronic), unchanged from prior , off av brandon blockers   - No evidence of any active ischemia     - TTE (2021) normal LVSF, EF 55%, severe LAE, and mod LVH.  Can repeat routinely  - known severe ai, and deemed not a surgical candidate in past  - No evidence of volume overload    - BP stable and controlled with -170s   - Continue Losartan and Norvasc with hold parameters    - Monitor and replete lytes, keep K>4, Mg>2.  - Will continue to follow.    Rachel Lopez, MS FNP, AGACNP  Nurse Practitioner- Cardiology   Please call on TEAMS

## 2024-01-29 NOTE — DIETITIAN NUTRITION RISK NOTIFICATION - TREATMENT: THE FOLLOWING DIET HAS BEEN RECOMMENDED
Diet, DASH/TLC:   Sodium & Cholesterol Restricted  Supplement Feeding Modality:  Oral  Ensure Plus High Protein Cans or Servings Per Day:  1       Frequency:  Two Times a day (01-27-24 @ 14:23) [Active]

## 2024-01-29 NOTE — CARE COORDINATION ASSESSMENT. - PRO ARRIVE FROM
CM met with pt who was a/0x3 but stated he "doesn't remember some things" and referred CM to speak with Shilpa Penaloza [phone]. CM left message and awaiting call back. CM obtained information from chart. pt  lives with spouse and has aides through [facility] [phone] Gwendolyn Mars CM 12h daily. CM left message for Gwendolyn with callback information. Pt recently discharged with [facility] services from [facility] [phone] pt has ramp, hospital bed, WC, rolling walker, shower chair. Pt will need transportation home when medically ready for DC./home

## 2024-01-30 ENCOUNTER — TRANSCRIPTION ENCOUNTER (OUTPATIENT)
Age: 89
End: 2024-01-30

## 2024-01-30 VITALS
HEART RATE: 54 BPM | SYSTOLIC BLOOD PRESSURE: 158 MMHG | RESPIRATION RATE: 18 BRPM | OXYGEN SATURATION: 93 % | DIASTOLIC BLOOD PRESSURE: 57 MMHG | TEMPERATURE: 98 F

## 2024-01-30 LAB
-  AMOXICILLIN/CLAVULANIC ACID: SIGNIFICANT CHANGE UP
-  AMPICILLIN/SULBACTAM: SIGNIFICANT CHANGE UP
-  AMPICILLIN: SIGNIFICANT CHANGE UP
-  AZTREONAM: SIGNIFICANT CHANGE UP
-  CEFAZOLIN: SIGNIFICANT CHANGE UP
-  CEFEPIME: SIGNIFICANT CHANGE UP
-  CEFTRIAXONE: SIGNIFICANT CHANGE UP
-  CEFUROXIME: SIGNIFICANT CHANGE UP
-  CIPROFLOXACIN: SIGNIFICANT CHANGE UP
-  ERTAPENEM: SIGNIFICANT CHANGE UP
-  GENTAMICIN: SIGNIFICANT CHANGE UP
-  LEVOFLOXACIN: SIGNIFICANT CHANGE UP
-  MEROPENEM: SIGNIFICANT CHANGE UP
-  NITROFURANTOIN: SIGNIFICANT CHANGE UP
-  PIPERACILLIN/TAZOBACTAM: SIGNIFICANT CHANGE UP
-  TOBRAMYCIN: SIGNIFICANT CHANGE UP
-  TRIMETHOPRIM/SULFAMETHOXAZOLE: SIGNIFICANT CHANGE UP
ANION GAP SERPL CALC-SCNC: 5 MMOL/L — SIGNIFICANT CHANGE UP (ref 5–17)
BUN SERPL-MCNC: 24 MG/DL — HIGH (ref 7–23)
CALCIUM SERPL-MCNC: 8.5 MG/DL — SIGNIFICANT CHANGE UP (ref 8.5–10.1)
CHLORIDE SERPL-SCNC: 110 MMOL/L — HIGH (ref 96–108)
CO2 SERPL-SCNC: 24 MMOL/L — SIGNIFICANT CHANGE UP (ref 22–31)
CREAT SERPL-MCNC: 0.6 MG/DL — SIGNIFICANT CHANGE UP (ref 0.5–1.3)
CULTURE RESULTS: ABNORMAL
EGFR: 90 ML/MIN/1.73M2 — SIGNIFICANT CHANGE UP
GLUCOSE SERPL-MCNC: 84 MG/DL — SIGNIFICANT CHANGE UP (ref 70–99)
HCT VFR BLD CALC: 29.8 % — LOW (ref 39–50)
HGB BLD-MCNC: 9.6 G/DL — LOW (ref 13–17)
INR BLD: 2.87 RATIO — HIGH (ref 0.85–1.18)
MCHC RBC-ENTMCNC: 30.1 PG — SIGNIFICANT CHANGE UP (ref 27–34)
MCHC RBC-ENTMCNC: 32.2 GM/DL — SIGNIFICANT CHANGE UP (ref 32–36)
MCV RBC AUTO: 93.4 FL — SIGNIFICANT CHANGE UP (ref 80–100)
METHOD TYPE: SIGNIFICANT CHANGE UP
NRBC # BLD: 0 /100 WBCS — SIGNIFICANT CHANGE UP (ref 0–0)
ORGANISM # SPEC MICROSCOPIC CNT: ABNORMAL
ORGANISM # SPEC MICROSCOPIC CNT: SIGNIFICANT CHANGE UP
PLATELET # BLD AUTO: 318 K/UL — SIGNIFICANT CHANGE UP (ref 150–400)
POTASSIUM SERPL-MCNC: 3.8 MMOL/L — SIGNIFICANT CHANGE UP (ref 3.5–5.3)
POTASSIUM SERPL-SCNC: 3.8 MMOL/L — SIGNIFICANT CHANGE UP (ref 3.5–5.3)
PROTHROM AB SERPL-ACNC: 32.6 SEC — HIGH (ref 9.5–13)
RBC # BLD: 3.19 M/UL — LOW (ref 4.2–5.8)
RBC # FLD: 15.9 % — HIGH (ref 10.3–14.5)
SODIUM SERPL-SCNC: 139 MMOL/L — SIGNIFICANT CHANGE UP (ref 135–145)
SPECIMEN SOURCE: SIGNIFICANT CHANGE UP
WBC # BLD: 8.27 K/UL — SIGNIFICANT CHANGE UP (ref 3.8–10.5)
WBC # FLD AUTO: 8.27 K/UL — SIGNIFICANT CHANGE UP (ref 3.8–10.5)

## 2024-01-30 PROCEDURE — 87077 CULTURE AEROBIC IDENTIFY: CPT

## 2024-01-30 PROCEDURE — 80053 COMPREHEN METABOLIC PANEL: CPT

## 2024-01-30 PROCEDURE — 85025 COMPLETE CBC W/AUTO DIFF WBC: CPT

## 2024-01-30 PROCEDURE — 83605 ASSAY OF LACTIC ACID: CPT

## 2024-01-30 PROCEDURE — 99232 SBSQ HOSP IP/OBS MODERATE 35: CPT

## 2024-01-30 PROCEDURE — 85610 PROTHROMBIN TIME: CPT

## 2024-01-30 PROCEDURE — 81001 URINALYSIS AUTO W/SCOPE: CPT

## 2024-01-30 PROCEDURE — 36415 COLL VENOUS BLD VENIPUNCTURE: CPT

## 2024-01-30 PROCEDURE — 80048 BASIC METABOLIC PNL TOTAL CA: CPT

## 2024-01-30 PROCEDURE — 99285 EMERGENCY DEPT VISIT HI MDM: CPT

## 2024-01-30 PROCEDURE — 85730 THROMBOPLASTIN TIME PARTIAL: CPT

## 2024-01-30 PROCEDURE — 93005 ELECTROCARDIOGRAM TRACING: CPT

## 2024-01-30 PROCEDURE — 97162 PT EVAL MOD COMPLEX 30 MIN: CPT

## 2024-01-30 PROCEDURE — 87040 BLOOD CULTURE FOR BACTERIA: CPT

## 2024-01-30 PROCEDURE — 51702 INSERT TEMP BLADDER CATH: CPT

## 2024-01-30 PROCEDURE — 96365 THER/PROPH/DIAG IV INF INIT: CPT

## 2024-01-30 PROCEDURE — 87086 URINE CULTURE/COLONY COUNT: CPT

## 2024-01-30 PROCEDURE — 97110 THERAPEUTIC EXERCISES: CPT

## 2024-01-30 PROCEDURE — 85027 COMPLETE CBC AUTOMATED: CPT

## 2024-01-30 PROCEDURE — G0378: CPT

## 2024-01-30 PROCEDURE — 87186 SC STD MICRODIL/AGAR DIL: CPT

## 2024-01-30 PROCEDURE — 82550 ASSAY OF CK (CPK): CPT

## 2024-01-30 RX ORDER — APIXABAN 2.5 MG/1
1 TABLET, FILM COATED ORAL
Qty: 0 | Refills: 0 | DISCHARGE
Start: 2024-01-30

## 2024-01-30 RX ORDER — HYDRALAZINE HCL 50 MG
25 TABLET ORAL ONCE
Refills: 0 | Status: COMPLETED | OUTPATIENT
Start: 2024-01-30 | End: 2024-01-30

## 2024-01-30 RX ORDER — APIXABAN 2.5 MG/1
1 TABLET, FILM COATED ORAL
Qty: 60 | Refills: 0
Start: 2024-01-30 | End: 2024-02-28

## 2024-01-30 RX ADMIN — APIXABAN 5 MILLIGRAM(S): 2.5 TABLET, FILM COATED ORAL at 17:13

## 2024-01-30 RX ADMIN — AMLODIPINE BESYLATE 10 MILLIGRAM(S): 2.5 TABLET ORAL at 05:31

## 2024-01-30 RX ADMIN — Medication 25 MILLIGRAM(S): at 19:24

## 2024-01-30 RX ADMIN — LOSARTAN POTASSIUM 100 MILLIGRAM(S): 100 TABLET, FILM COATED ORAL at 05:30

## 2024-01-30 RX ADMIN — APIXABAN 5 MILLIGRAM(S): 2.5 TABLET, FILM COATED ORAL at 05:31

## 2024-01-30 NOTE — PROGRESS NOTE ADULT - NS ATTEND AMEND GEN_ALL_CORE FT
Ananth is a  89-year-old male PMH of A-fib on Coumadin, HTN, BPH, recent admission 1/5 through 1/13/2024, for urinary retention/UTI/sepsis, now here with non functioning palomino and UTI.    - slow af, off av brandon blockers  - on coumadin with inr now 3.2, though is having hematuria  - can consider switching coumadin to eliquis once bleeding issues have subsided  - known severe ai, and deemed not a surgical candidate in past  - cont losartan and amlodipine  - cont abx for uti  - trend creatinine and electrolytes. keep K>4, mg>2  - will follow with you.
Ananth is a  89-year-old male PMH of A-fib on Coumadin, HTN, BPH, recent admission 1/5 through 1/13/2024, for urinary retention/UTI/sepsis, now here with non functioning palomino and UTI.    - slow af, off av brandon blockers  - on coumadin with inr now 3.2, though is having hematuria  - can consider switching coumadin to eliquis once bleeding issues have subsided  - known severe ai, and deemed not a surgical candidate in past. Appears compensated from HF POV.   - cont losartan and amlodipine  - cont abx for uti  - trend creatinine and electrolytes. keep K>4, mg>2  - will follow with you.
Ananth is a  89-year-old male PMH of A-fib on Coumadin, HTN, BPH, recent admission 1/5 through 1/13/2024, for urinary retention/UTI/sepsis, now here with non functioning palomino and UTI.    - slow af, off av brandon blockers  - was on coumadin with inr now 2.87, though having hematuria  - now switched to eliquis  - known severe ai, and deemed not a surgical candidate in past. Appears compensated from HF POV.   - cont losartan and amlodipine  - cont abx for uti  - trend creatinine and electrolytes. keep K>4, mg>2  - will follow with you.

## 2024-01-30 NOTE — PROGRESS NOTE ADULT - PROVIDER SPECIALTY LIST ADULT
Cardiology
Infectious Disease
Infectious Disease
Cardiology
Cardiology
Internal Medicine
Internal Medicine
Infectious Disease

## 2024-01-30 NOTE — PROGRESS NOTE ADULT - REASON FOR ADMISSION
clogged palomino

## 2024-01-30 NOTE — DISCHARGE NOTE PROVIDER - HOSPITAL COURSE
89 m aw  urinary retention dt clogged palomino, leucocytosis, palomino changed in ED with good urine outflow  ruled out UTI, observed off abx  BPH hx-resume flomax, proscar  leucocytosis- improved  was on coumadin at home for afib- switched to eliquis   tolerating well, hb stable  outpt fu

## 2024-01-30 NOTE — SOCIAL WORK PROGRESS NOTE - NSSWPROGRESSNOTE_GEN_ALL_CORE
SW spoke with pts dtr- she is agreeable to [facility], referral sent to local facilities [address] [place] [address]. Clinicals to be faxed to [facility] medicare. SW to follow.

## 2024-01-30 NOTE — SOCIAL WORK PROGRESS NOTE - NSSWPROGRESSNOTE_GEN_ALL_CORE
ASHU spoke with pts dtr- she is agreeable to [facility] accepting and auth obtained. MEssage left for dtr to discuss dc. ASHU To follow.

## 2024-01-30 NOTE — DISCHARGE NOTE NURSING/CASE MANAGEMENT/SOCIAL WORK - PATIENT PORTAL LINK FT
You can access the FollowMyHealth Patient Portal offered by [facility] by registering at the following website: [email or web address]. By joining [employer]’s FollowMyHealth portal, you will also be able to view your health information using other applications (apps) compatible with our system.

## 2024-01-30 NOTE — DISCHARGE NOTE PROVIDER - DETAILS OF MALNUTRITION DIAGNOSIS/DIAGNOSES
This patient has been assessed with a concern for Malnutrition and was treated during this hospitalization for the following Nutrition diagnosis/diagnoses:     -  01/29/2024: Moderate protein-calorie malnutrition

## 2024-01-30 NOTE — PROGRESS NOTE ADULT - ASSESSMENT
89-year-old male PMH of A-fib on Coumadin, chronic Craig, HTN, BPH, recent admission (1/5- 13/2024) for UTI/sepsis, p/w c/o Craig catheter not draining with suprapubic pressure, Follows with Dr Kulkarni.     Afib (on Coumadin) HTN   - p/w non draining Craig catheter with suprapubic pressure, found to have +UTI and hematuria, ID consulted  - Known hx of Afib on coumadin at home  - Now started on Eliquis   - Rate controlled per flow sheets    - EKG: Afib with SVR,  RBBB (chronic), unchanged from prior , off av brandon blockers   - No evidence of any active ischemia     - TTE (2021) normal LVSF, EF 55%, severe LAE, and mod LVH.  Can repeat routinely  - known severe ai, and deemed not a surgical candidate in past  - No evidence of volume overload    - BP elevated 150-170s. Am dose amlodipine held yesterday  - Continue Losartan and Norvasc with hold parameters    - Monitor and replete lytes, keep K>4, Mg>2.  - Will continue to follow.    Rachel Lopez, MS FNP, AGACNP  Nurse Practitioner- Cardiology   Please call on TEAMS

## 2024-01-30 NOTE — PATIENT CHOICE NOTE. - NSPTCHOICESTATE_GEN_ALL_CORE
I have met with the patient and/or caregiver to discuss discharge goals and treatment plan. Patient and/or caregiver also provided with instructions on accessing the CMS Compare websites for additional information related to Post Acute Provider quality and resource use measures to assist them in evaluation of the providers and in selecting their post-acute provider of choice. Patient and caregiver were informed of the facilities that are owned and/or operated by [facility]. I have discussed with the patient the availability of in-network facilities and providers. Patient and caregiver provided with a list of post-acute providers whose services are appropriate to the discharge plans and patient needs.     For patient requiring durable medical equipment, patient and/or caregiver were informed that they have the right to request who provides the required equipment.
I have met with the patient and/or caregiver to discuss discharge goals and treatment plan. Patient and/or caregiver also provided with instructions on accessing the CMS Compare websites for additional information related to Post Acute Provider quality and resource use measures to assist them in evaluation of the providers and in selecting their post-acute provider of choice. Patient and caregiver were informed of the facilities that are owned and/or operated by [facility]. I have discussed with the patient the availability of in-network facilities and providers. Patient and caregiver provided with a list of post-acute providers whose services are appropriate to the discharge plans and patient needs.     For patient requiring durable medical equipment, patient and/or caregiver were informed that they have the right to request who provides the required equipment.

## 2024-01-30 NOTE — DISCHARGE NOTE PROVIDER - CARE PROVIDER_API CALL
Hang Leger  Internal Medicine  [address] [place] [address]  Phone: [phone]  Fax: [phone]  Follow Up Time: 2 weeks

## 2024-01-30 NOTE — DISCHARGE NOTE NURSING/CASE MANAGEMENT/SOCIAL WORK - MODE OF TRANSPORTATION
Patient transported to [address] [place], [place] [address]perez Lopez RN  05/14/22 2872 Ambulance/Other

## 2024-01-30 NOTE — SOCIAL WORK PROGRESS NOTE - NSSWPROGRESSNOTE_GEN_ALL_CORE
Several messages left for pts dtr and son = pt ready for DC today, awaiting call back. SW to follow.

## 2024-01-30 NOTE — PROGRESS NOTE ADULT - SUBJECTIVE AND OBJECTIVE BOX
[facility] DIVISION of INFECTIOUS DISEASE  Bin Garcia MD PhD, Brooklyn Amin MD, Denise Sarabia MD, Grady Rico MD, Cyrus Espinoza MD  and providing coverage with Stefany Qureshi MD  Providing Infectious Disease Consultations at [facility], [facility], [place], [place], [facility], [facility]'s    Office# [phone] to schedule follow up appointments  Answering Service for urgent calls or New Consults [phone]  Cell# to text for urgent issues Bin Garcia [phone]     infectious diseases progress note:    GURINDER TURNER is a 89y y. o. Male patient    Overnight and events of the last 24hrs reviewed    Allergies    No Known Allergies    Intolerances        ANTIBIOTICS/RELEVANT:  antimicrobials    immunologic:  influenza  Vaccine (HIGH DOSE) 0.7 milliLiter(s) IntraMuscular once    OTHER:  amLODIPine   Tablet 10 milliGRAM(s) Oral daily  apixaban 5 milliGRAM(s) Oral two times a day  losartan 100 milliGRAM(s) Oral daily      Objective:  Vital Signs Last 24 Hrs  T(C): 36.4 (30 Jan 2024 05:26), Max: 36.7 (29 Jan 2024 20:10)  T(F): 97.6 (30 Jan 2024 05:26), Max: 98 (29 Jan 2024 20:10)  HR: 54 (30 Jan 2024 05:26) (53 - 58)  BP: 186/68 (30 Jan 2024 05:26) (156/67 - 186/68)  BP(mean): --  RR: 18 (30 Jan 2024 05:26) (18 - 97)  SpO2: 98% (30 Jan 2024 05:26) (97% - 98%)    Parameters below as of 30 Jan 2024 05:26  Patient On (Oxygen Delivery Method): room air        T(C): 36.4 (01-30-24 @ 05:26), Max: 36.7 (01-28-24 @ 11:22)  T(C): 36.4 (01-30-24 @ 05:26), Max: 36.8 (01-27-24 @ 18:57)  T(C): 36.4 (01-30-24 @ 05:26), Max: 36.8 (01-27-24 @ 18:57)    PHYSICAL EXAM:  HEENT: NC atraumatic  Neck: supple  Respiratory: no accessory muscle use, breathing comfortably  Cardiovascular: distant  Gastrointestinal: normal appearing, nondistended  Extremities: no clubbing, no cyanosis,        LABS:                          9.6    8.27  )-----------( 318      ( 30 Jan 2024 07:10 )             29.8       WBC  8.27 01-30 @ 07:10  8.71 01-29 @ 05:30  9.85 01-28 @ 06:43  19.84 01-27 @ 09:15      01-30    139  |  110<H>  |  24<H>  ----------------------------<  84  3.8   |  24  |  0.60    Ca    8.5      30 Jan 2024 07:10        Creatinine: 0.60 mg/dL (01-30-24 @ 07:10)  Creatinine: 0.71 mg/dL (01-29-24 @ 05:30)  Creatinine: 0.66 mg/dL (01-28-24 @ 06:43)  Creatinine: 0.88 mg/dL (01-27-24 @ 09:15)      PT/INR - ( 30 Jan 2024 07:10 )   PT: 32.6 sec;   INR: 2.87 ratio           Urinalysis Basic - ( 30 Jan 2024 07:10 )    Color: x / Appearance: x / SG: x / pH: x  Gluc: 84 mg/dL / Ketone: x  / Bili: x / Urobili: x   Blood: x / Protein: x / Nitrite: x   Leuk Esterase: x / RBC: x / WBC x   Sq Epi: x / Non Sq Epi: x / Bacteria: x            INFLAMMATORY MARKERS      MICROBIOLOGY:              RADIOLOGY & ADDITIONAL STUDIES:

## 2024-01-30 NOTE — PROVIDER CONTACT NOTE (OTHER) - ACTION/TREATMENT ORDERED:
Dr. Mcclellan notified of above and stated she would order PO Hydralazine and patient is to be discharged with no holds. Transport team is here and notified. Dr. Mcclellan notified of above and stated she would order PO Hydralazine 25mg and patient is to be discharged with no holds per Dr. Mcclellan. Transport team is here and notified. Medication administered,

## 2024-01-30 NOTE — SOCIAL WORK PROGRESS NOTE - NSSWPROGRESSNOTE_GEN_ALL_CORE
PT for DC today, confirmed with dtr [facility] offering a bed. Auth 1/30-2/1 Auth# [id number]. Ambuln arranged for 5pm . Pt, family, team in agreement with dc. SW remains available.

## 2024-01-30 NOTE — DISCHARGE NOTE PROVIDER - NSDCCPCAREPLAN_GEN_ALL_CORE_FT
PRINCIPAL DISCHARGE DIAGNOSIS  Diagnosis: Acute UTI  Assessment and Plan of Treatment: aw  urinary retention dt clogged palomino, leucocytosis, palomino changed in ED with good urine outflow  ruled out UTI, observed off abx  BPH hx-resume flomax, proscar  leucocytosis- improved        SECONDARY DISCHARGE DIAGNOSES  Diagnosis: Afib  Assessment and Plan of Treatment: was on coumadin at home- switched to eliquis   tolerating well  outpt fu

## 2024-01-30 NOTE — CHART NOTE - NSCHARTNOTEFT_GEN_A_CORE
Called by RN for elevated BP. Patient was scheduled to be d/c to [facility] this evening. Transport was outside the room waiting to  patient when it was noted patient had /60 on electronic BP reading. Transport then refused to take the patient. Nurse was instructed to do a manual BP, which was 180/70. Patient was examined at bedside, and was completely asymptomatic. PO Hydralazine was given. Transport was then willing to take patient to [facility], however, stated that patient would likely be returned to the hospital with an elevated BP. Patient remains stable for d/c.        T(C): 36.5 (01-30-24 @ 12:39), Max: 36.7 (01-29-24 @ 20:10)  HR: 54 (01-30-24 @ 12:39) (54 - 58)  BP: 158/57 (01-30-24 @ 12:39) (158/57 - 186/68)  RR: 18 (01-30-24 @ 12:39) (18 - 18)  SpO2: 93% (01-30-24 @ 12:39) (93% - 98%)  Wt(kg): --    Physical :  Gen- NAD, ncat  Cardio - s+1,s+2, rrr, +murmur appreciated   Lung - cta b/l, no wheeze, no rhonchi, no rales   Abdomen- +BS, NT/ND, no guarding, no rebound, no masses  Ext- no edema, 2+ pulses b/l  Neuro- CN grossly intact, strength 5/5 b/l extrem    LABS:                        9.6    8.27  )-----------( 318      ( 30 Jan 2024 07:10 )             29.8     01-30    139  |  110<H>  |  24<H>  ----------------------------<  84  3.8   |  24  |  0.60    Ca    8.5      30 Jan 2024 07:10      PT/INR - ( 30 Jan 2024 07:10 )   PT: 32.6 sec;   INR: 2.87 ratio           Urinalysis Basic - ( 30 Jan 2024 07:10 )    Color: x / Appearance: x / SG: x / pH: x  Gluc: 84 mg/dL / Ketone: x  / Bili: x / Urobili: x   Blood: x / Protein: x / Nitrite: x   Leuk Esterase: x / RBC: x / WBC x   Sq Epi: x / Non Sq Epi: x / Bacteria: x              Assessment/Plan  89-year-old male PMH of A-fib on Coumadin, chronic indwelling Palomino, HTN, BPH, recent admission 1/5 through 1/13/2024 for UTI/sepsis now admitted with urinary retention dt clogged palomino, leucocytosis r/o UTI  - Manual /70  - Given PO Hydralazine 25mg   - Patient asymptomatic --> denies SOB, chest pain, headache, dizziness, visual disturbances   - Patient remains stable for d/c

## 2024-01-30 NOTE — DISCHARGE NOTE PROVIDER - NSDCMRMEDTOKEN_GEN_ALL_CORE_FT
apixaban 5 mg oral tablet: 1 tab(s) orally 2 times a day  Eliquis 5 mg oral tablet: 1 tab(s) orally 2 times a day  finasteride 5 mg oral tablet: 1 tab(s) orally once a day  latanoprost 0.005% ophthalmic solution: 1 drop(s) in each eye once a day  losartan 100 mg oral tablet: 1 tab(s) orally once a day  Multiple Vitamins with Minerals oral tablet: 1 tab(s) orally once a day  Norvasc 10 mg oral tablet: 1 tab(s) orally once a day  tamsulosin 0.4 mg oral capsule: 2 cap(s) orally once a day (at bedtime)

## 2024-01-30 NOTE — DISCHARGE NOTE NURSING/CASE MANAGEMENT/SOCIAL WORK - NSDCPEFALRISK_GEN_ALL_CORE
For information on Fall & Injury Prevention, visit: [email or web address] OR  [email or web address] OR  https://www.cdc.gov/steadi/patient.html

## 2024-01-30 NOTE — PROGRESS NOTE ADULT - SUBJECTIVE AND OBJECTIVE BOX
[facility] -- Cayla Orta, Jacky Ashraf Savella, Goodger, Cohen: Office # [phone]    Follow Up: A suki, on AC      Subjective/Observations: Patient awake, alert, resting in bed. Denies chest pain, palpitations and dizziness. Denies any difficulty breathing. No orthopnea and PND. Tolerating room air. No hematuria noted     REVIEW OF SYSTEMS: All other review of systems are negative unless indicated above    PAST MEDICAL & SURGICAL HISTORY:  HTN (hypertension)      Atrial fibrillation      Degeneration macular      History of BPH      Chronic indwelling Craig catheter    MEDICATIONS  (STANDING):  amLODIPine   Tablet 10 milliGRAM(s) Oral daily  apixaban 5 milliGRAM(s) Oral two times a day  influenza  Vaccine (HIGH DOSE) 0.7 milliLiter(s) IntraMuscular once  losartan 100 milliGRAM(s) Oral daily    MEDICATIONS  (PRN):    Allergies    No Known Allergies    Intolerances      Vital Signs Last 24 Hrs  T(C): 36.4 (30 Jan 2024 05:26), Max: 36.7 (29 Jan 2024 20:10)  T(F): 97.6 (30 Jan 2024 05:26), Max: 98 (29 Jan 2024 20:10)  HR: 54 (30 Jan 2024 05:26) (53 - 58)  BP: 186/68 (30 Jan 2024 05:26) (156/67 - 186/68)  BP(mean): --  RR: 18 (30 Jan 2024 05:26) (18 - 97)  SpO2: 98% (30 Jan 2024 05:26) (97% - 98%)    Parameters below as of 30 Jan 2024 05:26  Patient On (Oxygen Delivery Method): room air      I&O's Summary    29 Jan 2024 07:01  -  30 Jan 2024 07:00  --------------------------------------------------------  IN: 0 mL / OUT: 1300 mL / NET: -1300 mL    TELE: Not on telemetry   PHYSICAL EXAM:  Constitutional: NAD, awake and alert  HEENT: Moist Mucous Membranes, Anicteric  Pulmonary: Non-labored, breath sounds are clear bilaterally, No wheezing, rales or rhonchi  Cardiovascular: Regular, S1 and S2, No murmurs, No rubs, gallops or clicks  Gastrointestinal:  soft, nontender, nondistended   Lymph: No peripheral edema. No lymphadenopathy.   Skin: No visible rashes or ulcers.  Psych:  Mood & affect appropriate    LABS: All Labs Reviewed:                        9.6    8.27  )-----------( 318      ( 30 Jan 2024 07:10 )             29.8                         8.8    8.71  )-----------( 305      ( 29 Jan 2024 05:30 )             27.5                         8.8    9.85  )-----------( 314      ( 28 Jan 2024 06:43 )             27.5     30 Jan 2024 07:10    139    |  110    |  24     ----------------------------<  84     3.8     |  24     |  0.60   29 Jan 2024 05:30    139    |  109    |  25     ----------------------------<  81     3.7     |  24     |  0.71   28 Jan 2024 06:43    138    |  109    |  20     ----------------------------<  83     3.9     |  25     |  0.66     Ca    8.5        30 Jan 2024 07:10  Ca    8.5        29 Jan 2024 05:30  Ca    8.3        28 Jan 2024 06:43       PT/INR - ( 30 Jan 2024 07:10 )   PT: 32.6 sec;   INR: 2.87 ratio         Creatine Kinase, Serum: 36 U/L (01-27-24 @ 14:30)    12 Lead ECG:   Ventricular Rate 55 BPM    QRS Duration 142 ms    Q-T Interval 510 ms    QTC Calculation(Bazett) 487 ms    R Axis 4 degrees    T Axis 41 degrees    Diagnosis Line Atrial fibrillation with slow ventricular response  Right bundle branch block  Abnormal ECG  Confirmed by anne Geller ([id number]) on 1/27/2024 11:33:39 AM (01-27-24 @ 10:30)

## 2024-02-01 LAB
CULTURE RESULTS: SIGNIFICANT CHANGE UP
CULTURE RESULTS: SIGNIFICANT CHANGE UP
SPECIMEN SOURCE: SIGNIFICANT CHANGE UP
SPECIMEN SOURCE: SIGNIFICANT CHANGE UP

## 2024-04-18 RX ORDER — APIXABAN 5 MG/1
5 TABLET, FILM COATED ORAL
Qty: 180 | Refills: 3 | Status: ACTIVE | COMMUNITY
Start: 2024-03-18

## 2024-04-22 ENCOUNTER — EMERGENCY (EMERGENCY)
Facility: HOSPITAL | Age: 89
LOS: 1 days | Discharge: HOME | End: 2024-04-22
Attending: EMERGENCY MEDICINE | Admitting: EMERGENCY MEDICINE
Payer: MEDICARE

## 2024-04-22 VITALS
DIASTOLIC BLOOD PRESSURE: 65 MMHG | RESPIRATION RATE: 17 BRPM | TEMPERATURE: 98 F | SYSTOLIC BLOOD PRESSURE: 120 MMHG | HEART RATE: 58 BPM | OXYGEN SATURATION: 97 % | WEIGHT: 139.99 LBS

## 2024-04-22 VITALS
SYSTOLIC BLOOD PRESSURE: 118 MMHG | HEART RATE: 57 BPM | DIASTOLIC BLOOD PRESSURE: 63 MMHG | RESPIRATION RATE: 17 BRPM | OXYGEN SATURATION: 97 % | TEMPERATURE: 98 F

## 2024-04-22 PROBLEM — Z87.438 PERSONAL HISTORY OF OTHER DISEASES OF MALE GENITAL ORGANS: Chronic | Status: ACTIVE | Noted: 2024-01-27

## 2024-04-22 PROBLEM — Z97.8 PRESENCE OF OTHER SPECIFIED DEVICES: Chronic | Status: ACTIVE | Noted: 2024-01-27

## 2024-04-22 LAB
APPEARANCE UR: ABNORMAL
BACTERIA # UR AUTO: ABNORMAL /HPF
BILIRUB UR-MCNC: NEGATIVE — SIGNIFICANT CHANGE UP
COLOR SPEC: YELLOW — SIGNIFICANT CHANGE UP
COMMENT - URINE: SIGNIFICANT CHANGE UP
DIFF PNL FLD: ABNORMAL
EPI CELLS # UR: SIGNIFICANT CHANGE UP
GLUCOSE UR QL: NEGATIVE MG/DL — SIGNIFICANT CHANGE UP
KETONES UR-MCNC: NEGATIVE MG/DL — SIGNIFICANT CHANGE UP
LEUKOCYTE ESTERASE UR-ACNC: ABNORMAL
NITRITE UR-MCNC: NEGATIVE — SIGNIFICANT CHANGE UP
PH UR: 6.5 — SIGNIFICANT CHANGE UP (ref 5–8)
PROT UR-MCNC: >=1000 MG/DL
RBC CASTS # UR COMP ASSIST: ABNORMAL /HPF
SP GR SPEC: 1.01 — SIGNIFICANT CHANGE UP (ref 1–1.03)
UROBILINOGEN FLD QL: 0.2 MG/DL — SIGNIFICANT CHANGE UP (ref 0.2–1)
WBC UR QL: ABNORMAL /HPF (ref 0–5)

## 2024-04-22 PROCEDURE — 99283 EMERGENCY DEPT VISIT LOW MDM: CPT | Mod: 25

## 2024-04-22 PROCEDURE — 87077 CULTURE AEROBIC IDENTIFY: CPT

## 2024-04-22 PROCEDURE — 87186 SC STD MICRODIL/AGAR DIL: CPT

## 2024-04-22 PROCEDURE — 81001 URINALYSIS AUTO W/SCOPE: CPT

## 2024-04-22 PROCEDURE — 99284 EMERGENCY DEPT VISIT MOD MDM: CPT

## 2024-04-22 PROCEDURE — 51702 INSERT TEMP BLADDER CATH: CPT

## 2024-04-22 PROCEDURE — 87086 URINE CULTURE/COLONY COUNT: CPT

## 2024-04-22 RX ORDER — CEFUROXIME AXETIL 250 MG
500 TABLET ORAL ONCE
Refills: 0 | Status: COMPLETED | OUTPATIENT
Start: 2024-04-22 | End: 2024-04-22

## 2024-04-22 RX ORDER — CEFUROXIME AXETIL 250 MG
1 TABLET ORAL
Qty: 14 | Refills: 0
Start: 2024-04-22 | End: 2024-04-28

## 2024-04-22 RX ADMIN — Medication 500 MILLIGRAM(S): at 13:19

## 2024-04-22 NOTE — ED ADULT TRIAGE NOTE - CHIEF COMPLAINT QUOTE
Pt BIBA from home for increased mucus in chronic palomino and more altered than normal as per home health aide. Pt disoriented at baseline.

## 2024-04-22 NOTE — ED PROVIDER NOTE - PATIENT PORTAL LINK FT
You can access the FollowMyHealth Patient Portal offered by [facility] by registering at the following website: [email or web address]. By joining [employer]’s FollowMyHealth portal, you will also be able to view your health information using other applications (apps) compatible with our system.

## 2024-04-22 NOTE — ED ADULT NURSE NOTE - OBJECTIVE STATEMENT
Pt presents to  ED from home via ambulance with an chronic indwelling palomino cath with cloudy urine. Private aide at bedside, stating" It was draining yesterday." Pt is bedbound Pt presents to  ED from home via ambulance with an chronic indwelling palomino cath with cloudy urine. Pt has hypospadias  Private aide at bedside, stating" It was draining yesterday." Pt is bedbound

## 2024-04-22 NOTE — ED PROVIDER NOTE - OBJECTIVE STATEMENT
89yo male bib ems with cloudy urine. pt has indwelling palomino and aid called 911 because pt was having cloudy urine and more altered, pt is confused at baseline,

## 2024-04-22 NOTE — ED ADULT NURSE NOTE - NSFALLHARMRISKINTERV_ED_ALL_ED
Assistance OOB with selected safe patient handling equipment if applicable/Assistance with ambulation/Communicate risk of Fall with Harm to all staff, patient, and family/Monitor gait and stability/Monitor for mental status changes and reorient to person, place, and time, as needed/Provide visual cue: red socks, yellow wristband, yellow gown, etc/Reinforce activity limits and safety measures with patient and family/Toileting schedule using arm’s reach rule for commode and bathroom/Use of alarms - bed, stretcher, chair and/or video monitoring/Bed in lowest position, wheels locked, appropriate side rails in place/Call bell, personal items and telephone in reach/Instruct patient to call for assistance before getting out of bed/chair/stretcher/Non-slip footwear applied when patient is off stretcher/[place] to call system/Physically safe environment - no spills, clutter or unnecessary equipment/Purposeful Proactive Rounding/Room/bathroom lighting operational, light cord in reach

## 2024-04-26 LAB
-  AMOXICILLIN/CLAVULANIC ACID: SIGNIFICANT CHANGE UP
-  AMPICILLIN/SULBACTAM: SIGNIFICANT CHANGE UP
-  AMPICILLIN: SIGNIFICANT CHANGE UP
-  AZTREONAM: SIGNIFICANT CHANGE UP
-  CEFAZOLIN: SIGNIFICANT CHANGE UP
-  CEFEPIME: SIGNIFICANT CHANGE UP
-  CEFTRIAXONE: SIGNIFICANT CHANGE UP
-  CEFUROXIME: SIGNIFICANT CHANGE UP
-  CIPROFLOXACIN: SIGNIFICANT CHANGE UP
-  DAPTOMYCIN: SIGNIFICANT CHANGE UP
-  ERTAPENEM: SIGNIFICANT CHANGE UP
-  GENTAMICIN: SIGNIFICANT CHANGE UP
-  GENTAMICIN: SIGNIFICANT CHANGE UP
-  LEVOFLOXACIN: SIGNIFICANT CHANGE UP
-  LINEZOLID: SIGNIFICANT CHANGE UP
-  MEROPENEM: SIGNIFICANT CHANGE UP
-  NITROFURANTOIN: SIGNIFICANT CHANGE UP
-  OXACILLIN: SIGNIFICANT CHANGE UP
-  PENICILLIN: SIGNIFICANT CHANGE UP
-  PIPERACILLIN/TAZOBACTAM: SIGNIFICANT CHANGE UP
-  RIFAMPIN: SIGNIFICANT CHANGE UP
-  TETRACYCLINE: SIGNIFICANT CHANGE UP
-  TOBRAMYCIN: SIGNIFICANT CHANGE UP
-  TRIMETHOPRIM/SULFAMETHOXAZOLE: SIGNIFICANT CHANGE UP
-  TRIMETHOPRIM/SULFAMETHOXAZOLE: SIGNIFICANT CHANGE UP
-  VANCOMYCIN: SIGNIFICANT CHANGE UP
CULTURE RESULTS: ABNORMAL
METHOD TYPE: SIGNIFICANT CHANGE UP
METHOD TYPE: SIGNIFICANT CHANGE UP
ORGANISM # SPEC MICROSCOPIC CNT: ABNORMAL
ORGANISM # SPEC MICROSCOPIC CNT: ABNORMAL
ORGANISM # SPEC MICROSCOPIC CNT: SIGNIFICANT CHANGE UP
SPECIMEN SOURCE: SIGNIFICANT CHANGE UP

## 2024-04-27 NOTE — ED POST DISCHARGE NOTE - DETAILS
Results called into [facility] who will contact daughter and discuss results. called daughter Ines discussed that multiple bacterias growing and sensitivity to current antibiotic provided. Daughter states PMD changed antibiotic to Cipro. I instructed daughter that the patient should be brought back to the ED for any signs and symptoms of infection such as but not limited to fever, chills, decreased po intake, and change in mental status. The daughter understood and would call PMD again Monday if patient has no changes in condition this weekend. called daughter Shilpa discussed that multiple bacterias growing and sensitivity to current antibiotic provided. Daughter states PMD changed antibiotic to Cipro. I instructed daughter that the patient should be brought back to the ED for any signs and symptoms of infection such as but not limited to fever, chills, decreased po intake, and change in mental status. The daughter understood and would call PMD again Monday if patient has no changes in condition this weekend.

## 2024-04-27 NOTE — ED POST DISCHARGE NOTE - ADDITIONAL DOCUMENTATION
daughter was instructed to bring patient back to the ED for any of the above signs or symptoms or change in status.

## 2024-06-11 NOTE — ED ADULT TRIAGE NOTE - TEMPERATURE IN CELSIUS (DEGREES C)
36.4 details… ROM intact/normal gait/strength 5/5 bilateral upper extremities/strength 5/5 bilateral lower extremities

## 2024-06-12 ENCOUNTER — INPATIENT (INPATIENT)
Facility: HOSPITAL | Age: 89
LOS: 6 days | Discharge: HOME | DRG: 698 | End: 2024-06-19
Attending: INTERNAL MEDICINE | Admitting: INTERNAL MEDICINE
Payer: MEDICARE

## 2024-06-12 VITALS
HEART RATE: 78 BPM | DIASTOLIC BLOOD PRESSURE: 65 MMHG | OXYGEN SATURATION: 99 % | HEIGHT: 69 IN | TEMPERATURE: 98 F | SYSTOLIC BLOOD PRESSURE: 106 MMHG | WEIGHT: 130.07 LBS | RESPIRATION RATE: 15 BRPM

## 2024-06-12 LAB
ALBUMIN SERPL ELPH-MCNC: 2.9 G/DL — LOW (ref 3.3–5)
ALP SERPL-CCNC: 90 U/L — SIGNIFICANT CHANGE UP (ref 40–120)
ALT FLD-CCNC: 14 U/L — SIGNIFICANT CHANGE UP (ref 12–78)
ANION GAP SERPL CALC-SCNC: 8 MMOL/L — SIGNIFICANT CHANGE UP (ref 5–17)
APPEARANCE UR: ABNORMAL
APTT BLD: 38.8 SEC — HIGH (ref 24.5–35.6)
AST SERPL-CCNC: 15 U/L — SIGNIFICANT CHANGE UP (ref 15–37)
BACTERIA # UR AUTO: ABNORMAL /HPF
BASOPHILS # BLD AUTO: 0.05 K/UL — SIGNIFICANT CHANGE UP (ref 0–0.2)
BASOPHILS NFR BLD AUTO: 0.3 % — SIGNIFICANT CHANGE UP (ref 0–2)
BILIRUB SERPL-MCNC: 0.5 MG/DL — SIGNIFICANT CHANGE UP (ref 0.2–1.2)
BILIRUB UR-MCNC: NEGATIVE — SIGNIFICANT CHANGE UP
BUN SERPL-MCNC: 31 MG/DL — HIGH (ref 7–23)
CALCIUM SERPL-MCNC: 9.5 MG/DL — SIGNIFICANT CHANGE UP (ref 8.5–10.1)
CHLORIDE SERPL-SCNC: 100 MMOL/L — SIGNIFICANT CHANGE UP (ref 96–108)
CO2 SERPL-SCNC: 26 MMOL/L — SIGNIFICANT CHANGE UP (ref 22–31)
COLOR SPEC: YELLOW — SIGNIFICANT CHANGE UP
CREAT SERPL-MCNC: 1.2 MG/DL — SIGNIFICANT CHANGE UP (ref 0.5–1.3)
DIFF PNL FLD: ABNORMAL
EGFR: 56 ML/MIN/1.73M2 — LOW
EOSINOPHIL # BLD AUTO: 0 K/UL — SIGNIFICANT CHANGE UP (ref 0–0.5)
EOSINOPHIL NFR BLD AUTO: 0 % — SIGNIFICANT CHANGE UP (ref 0–6)
FLUAV AG NPH QL: SIGNIFICANT CHANGE UP
FLUBV AG NPH QL: SIGNIFICANT CHANGE UP
GLUCOSE SERPL-MCNC: 160 MG/DL — HIGH (ref 70–99)
GLUCOSE UR QL: NEGATIVE MG/DL — SIGNIFICANT CHANGE UP
HCT VFR BLD CALC: 33.1 % — LOW (ref 39–50)
HGB BLD-MCNC: 10.6 G/DL — LOW (ref 13–17)
IMM GRANULOCYTES NFR BLD AUTO: 0.5 % — SIGNIFICANT CHANGE UP (ref 0–0.9)
INR BLD: 1.86 RATIO — HIGH (ref 0.85–1.18)
KETONES UR-MCNC: NEGATIVE MG/DL — SIGNIFICANT CHANGE UP
LACTATE SERPL-SCNC: 3 MMOL/L — HIGH (ref 0.7–2)
LEUKOCYTE ESTERASE UR-ACNC: ABNORMAL
LYMPHOCYTES # BLD AUTO: 0.55 K/UL — LOW (ref 1–3.3)
LYMPHOCYTES # BLD AUTO: 2.9 % — LOW (ref 13–44)
MCHC RBC-ENTMCNC: 30.5 PG — SIGNIFICANT CHANGE UP (ref 27–34)
MCHC RBC-ENTMCNC: 32 GM/DL — SIGNIFICANT CHANGE UP (ref 32–36)
MCV RBC AUTO: 95.1 FL — SIGNIFICANT CHANGE UP (ref 80–100)
MONOCYTES # BLD AUTO: 0.78 K/UL — SIGNIFICANT CHANGE UP (ref 0–0.9)
MONOCYTES NFR BLD AUTO: 4.1 % — SIGNIFICANT CHANGE UP (ref 2–14)
NEUTROPHILS # BLD AUTO: 17.48 K/UL — HIGH (ref 1.8–7.4)
NEUTROPHILS NFR BLD AUTO: 92.2 % — HIGH (ref 43–77)
NITRITE UR-MCNC: POSITIVE
NRBC # BLD: 0 /100 WBCS — SIGNIFICANT CHANGE UP (ref 0–0)
PH UR: 7 — SIGNIFICANT CHANGE UP (ref 5–8)
PLATELET # BLD AUTO: 391 K/UL — SIGNIFICANT CHANGE UP (ref 150–400)
POTASSIUM SERPL-MCNC: 3.8 MMOL/L — SIGNIFICANT CHANGE UP (ref 3.5–5.3)
POTASSIUM SERPL-SCNC: 3.8 MMOL/L — SIGNIFICANT CHANGE UP (ref 3.5–5.3)
PROT SERPL-MCNC: 7.3 G/DL — SIGNIFICANT CHANGE UP (ref 6–8.3)
PROT UR-MCNC: 100 MG/DL
PROTHROM AB SERPL-ACNC: 21.4 SEC — HIGH (ref 9.5–13)
RBC # BLD: 3.48 M/UL — LOW (ref 4.2–5.8)
RBC # FLD: 16.8 % — HIGH (ref 10.3–14.5)
RBC CASTS # UR COMP ASSIST: 2 /HPF — SIGNIFICANT CHANGE UP (ref 0–4)
RSV RNA NPH QL NAA+NON-PROBE: SIGNIFICANT CHANGE UP
SARS-COV-2 RNA SPEC QL NAA+PROBE: SIGNIFICANT CHANGE UP
SODIUM SERPL-SCNC: 134 MMOL/L — LOW (ref 135–145)
SP GR SPEC: 1.01 — SIGNIFICANT CHANGE UP (ref 1–1.03)
UROBILINOGEN FLD QL: 0.2 MG/DL — SIGNIFICANT CHANGE UP (ref 0.2–1)
WBC # BLD: 18.95 K/UL — HIGH (ref 3.8–10.5)
WBC # FLD AUTO: 18.95 K/UL — HIGH (ref 3.8–10.5)
WBC UR QL: ABNORMAL /HPF (ref 0–5)

## 2024-06-12 PROCEDURE — 71045 X-RAY EXAM CHEST 1 VIEW: CPT | Mod: 26

## 2024-06-12 PROCEDURE — 99285 EMERGENCY DEPT VISIT HI MDM: CPT

## 2024-06-12 PROCEDURE — 93010 ELECTROCARDIOGRAM REPORT: CPT

## 2024-06-12 RX ORDER — SODIUM CHLORIDE 0.9 % (FLUSH) 0.9 %
1000 SYRINGE (ML) INJECTION ONCE
Refills: 0 | Status: COMPLETED | OUTPATIENT
Start: 2024-06-12 | End: 2024-06-12

## 2024-06-12 RX ORDER — PIPERACILLIN SODIUM AND TAZOBACTAM SODIUM 3; .375 G/15ML; G/15ML
3.38 INJECTION, POWDER, LYOPHILIZED, FOR SOLUTION INTRAVENOUS ONCE
Refills: 0 | Status: COMPLETED | OUTPATIENT
Start: 2024-06-12 | End: 2024-06-12

## 2024-06-12 RX ORDER — VANCOMYCIN HYDROCHLORIDE 50 MG/ML
1000 KIT ORAL ONCE
Refills: 0 | Status: COMPLETED | OUTPATIENT
Start: 2024-06-12 | End: 2024-06-12

## 2024-06-12 RX ADMIN — Medication 1000 MILLILITER(S): at 21:27

## 2024-06-12 RX ADMIN — PIPERACILLIN SODIUM AND TAZOBACTAM SODIUM 3.38 GRAM(S): 3; .375 INJECTION, POWDER, LYOPHILIZED, FOR SOLUTION INTRAVENOUS at 20:58

## 2024-06-12 RX ADMIN — PIPERACILLIN SODIUM AND TAZOBACTAM SODIUM 200 GRAM(S): 3; .375 INJECTION, POWDER, LYOPHILIZED, FOR SOLUTION INTRAVENOUS at 20:28

## 2024-06-12 RX ADMIN — VANCOMYCIN HYDROCHLORIDE 1000 MILLIGRAM(S): KIT at 23:18

## 2024-06-12 RX ADMIN — VANCOMYCIN HYDROCHLORIDE 250 MILLIGRAM(S): KIT at 21:35

## 2024-06-12 RX ADMIN — Medication 1000 MILLILITER(S): at 20:27

## 2024-06-13 ENCOUNTER — RESULT REVIEW (OUTPATIENT)
Age: 89
End: 2024-06-13

## 2024-06-13 DIAGNOSIS — N39.0 URINARY TRACT INFECTION, SITE NOT SPECIFIED: ICD-10-CM

## 2024-06-13 DIAGNOSIS — L89.152 PRESSURE ULCER OF SACRAL REGION, STAGE 2: ICD-10-CM

## 2024-06-13 LAB
-  CTX-M RESISTANCE MARKER: SIGNIFICANT CHANGE UP
-  ESBL: SIGNIFICANT CHANGE UP
GRAM STN FLD: ABNORMAL
LACTATE SERPL-SCNC: 1 MMOL/L — SIGNIFICANT CHANGE UP (ref 0.7–2)
METHOD TYPE: SIGNIFICANT CHANGE UP
PROTEUS SP DNA BLD POS QL NAA+NON-PROBE: SIGNIFICANT CHANGE UP
SPECIMEN SOURCE: SIGNIFICANT CHANGE UP

## 2024-06-13 PROCEDURE — 76775 US EXAM ABDO BACK WALL LIM: CPT | Mod: 26

## 2024-06-13 PROCEDURE — 99221 1ST HOSP IP/OBS SF/LOW 40: CPT

## 2024-06-13 PROCEDURE — 93306 TTE W/DOPPLER COMPLETE: CPT | Mod: 26

## 2024-06-13 PROCEDURE — 99222 1ST HOSP IP/OBS MODERATE 55: CPT

## 2024-06-13 RX ORDER — TAMSULOSIN HYDROCHLORIDE 0.4 MG/1
0.8 CAPSULE ORAL AT BEDTIME
Refills: 0 | Status: DISCONTINUED | OUTPATIENT
Start: 2024-06-13 | End: 2024-06-14

## 2024-06-13 RX ORDER — APIXABAN 5 MG/1
2.5 TABLET, FILM COATED ORAL EVERY 12 HOURS
Refills: 0 | Status: DISCONTINUED | OUTPATIENT
Start: 2024-06-13 | End: 2024-06-19

## 2024-06-13 RX ORDER — DEXTROSE MONOHYDRATE AND SODIUM CHLORIDE 5; .3 G/100ML; G/100ML
1000 INJECTION, SOLUTION INTRAVENOUS
Refills: 0 | Status: DISCONTINUED | OUTPATIENT
Start: 2024-06-13 | End: 2024-06-19

## 2024-06-13 RX ORDER — LATANOPROST 0.05 MG/ML
1 SOLUTION/ DROPS OPHTHALMIC; TOPICAL
Refills: 0 | DISCHARGE

## 2024-06-13 RX ORDER — LATANOPROST PF 0.05 MG/ML
1 SOLUTION/ DROPS OPHTHALMIC AT BEDTIME
Refills: 0 | Status: DISCONTINUED | OUTPATIENT
Start: 2024-06-13 | End: 2024-06-19

## 2024-06-13 RX ORDER — ERTAPENEM SODIUM 1 G/1
1000 INJECTION, POWDER, LYOPHILIZED, FOR SOLUTION INTRAMUSCULAR; INTRAVENOUS EVERY 24 HOURS
Refills: 0 | Status: DISCONTINUED | OUTPATIENT
Start: 2024-06-13 | End: 2024-06-19

## 2024-06-13 RX ORDER — PANTOPRAZOLE SODIUM 40 MG/10ML
40 INJECTION, POWDER, FOR SOLUTION INTRAVENOUS
Refills: 0 | Status: DISCONTINUED | OUTPATIENT
Start: 2024-06-13 | End: 2024-06-19

## 2024-06-13 RX ORDER — AMLODIPINE BESYLATE 2.5 MG/1
1 TABLET ORAL
Refills: 0 | DISCHARGE

## 2024-06-13 RX ORDER — DEXTROSE MONOHYDRATE AND SODIUM CHLORIDE 5; .3 G/100ML; G/100ML
1000 INJECTION, SOLUTION INTRAVENOUS
Refills: 0 | Status: DISCONTINUED | OUTPATIENT
Start: 2024-06-13 | End: 2024-06-13

## 2024-06-13 RX ADMIN — Medication 5 MILLIGRAM(S): at 15:54

## 2024-06-13 RX ADMIN — DEXTROSE MONOHYDRATE AND SODIUM CHLORIDE 75 MILLILITER(S): 5; .3 INJECTION, SOLUTION INTRAVENOUS at 15:54

## 2024-06-13 RX ADMIN — APIXABAN 2.5 MILLIGRAM(S): 5 TABLET, FILM COATED ORAL at 18:25

## 2024-06-13 RX ADMIN — TAMSULOSIN HYDROCHLORIDE 0.8 MILLIGRAM(S): 0.4 CAPSULE ORAL at 22:14

## 2024-06-13 RX ADMIN — ERTAPENEM SODIUM 120 MILLIGRAM(S): 1 INJECTION, POWDER, LYOPHILIZED, FOR SOLUTION INTRAMUSCULAR; INTRAVENOUS at 15:54

## 2024-06-13 RX ADMIN — LATANOPROST PF 1 DROP(S): 0.05 SOLUTION/ DROPS OPHTHALMIC at 22:14

## 2024-06-13 RX ADMIN — PANTOPRAZOLE SODIUM 40 MILLIGRAM(S): 40 INJECTION, POWDER, FOR SOLUTION INTRAVENOUS at 06:00

## 2024-06-13 RX ADMIN — APIXABAN 2.5 MILLIGRAM(S): 5 TABLET, FILM COATED ORAL at 06:00

## 2024-06-13 RX ADMIN — DEXTROSE MONOHYDRATE AND SODIUM CHLORIDE 75 MILLILITER(S): 5; .3 INJECTION, SOLUTION INTRAVENOUS at 04:59

## 2024-06-14 LAB
ANION GAP SERPL CALC-SCNC: 5 MMOL/L — SIGNIFICANT CHANGE UP (ref 5–17)
BUN SERPL-MCNC: 21 MG/DL — SIGNIFICANT CHANGE UP (ref 7–23)
CALCIUM SERPL-MCNC: 8.2 MG/DL — LOW (ref 8.5–10.1)
CHLORIDE SERPL-SCNC: 105 MMOL/L — SIGNIFICANT CHANGE UP (ref 96–108)
CO2 SERPL-SCNC: 27 MMOL/L — SIGNIFICANT CHANGE UP (ref 22–31)
CREAT SERPL-MCNC: 0.86 MG/DL — SIGNIFICANT CHANGE UP (ref 0.5–1.3)
EGFR: 80 ML/MIN/1.73M2 — SIGNIFICANT CHANGE UP
GLUCOSE SERPL-MCNC: 90 MG/DL — SIGNIFICANT CHANGE UP (ref 70–99)
HCT VFR BLD CALC: 24.6 % — LOW (ref 39–50)
HCT VFR BLD CALC: 26.1 % — LOW (ref 39–50)
HGB BLD-MCNC: 7.9 G/DL — LOW (ref 13–17)
HGB BLD-MCNC: 8.6 G/DL — LOW (ref 13–17)
MCHC RBC-ENTMCNC: 30.7 PG — SIGNIFICANT CHANGE UP (ref 27–34)
MCHC RBC-ENTMCNC: 31.4 PG — SIGNIFICANT CHANGE UP (ref 27–34)
MCHC RBC-ENTMCNC: 32.1 GM/DL — SIGNIFICANT CHANGE UP (ref 32–36)
MCHC RBC-ENTMCNC: 33 GM/DL — SIGNIFICANT CHANGE UP (ref 32–36)
MCV RBC AUTO: 95.3 FL — SIGNIFICANT CHANGE UP (ref 80–100)
MCV RBC AUTO: 95.7 FL — SIGNIFICANT CHANGE UP (ref 80–100)
NRBC # BLD: 0 /100 WBCS — SIGNIFICANT CHANGE UP (ref 0–0)
NRBC # BLD: 0 /100 WBCS — SIGNIFICANT CHANGE UP (ref 0–0)
PLATELET # BLD AUTO: 263 K/UL — SIGNIFICANT CHANGE UP (ref 150–400)
PLATELET # BLD AUTO: 282 K/UL — SIGNIFICANT CHANGE UP (ref 150–400)
POTASSIUM SERPL-MCNC: 3.4 MMOL/L — LOW (ref 3.5–5.3)
POTASSIUM SERPL-SCNC: 3.4 MMOL/L — LOW (ref 3.5–5.3)
RBC # BLD: 2.57 M/UL — LOW (ref 4.2–5.8)
RBC # BLD: 2.74 M/UL — LOW (ref 4.2–5.8)
RBC # FLD: 16.6 % — HIGH (ref 10.3–14.5)
RBC # FLD: 16.8 % — HIGH (ref 10.3–14.5)
SODIUM SERPL-SCNC: 137 MMOL/L — SIGNIFICANT CHANGE UP (ref 135–145)
WBC # BLD: 9.26 K/UL — SIGNIFICANT CHANGE UP (ref 3.8–10.5)
WBC # BLD: 9.69 K/UL — SIGNIFICANT CHANGE UP (ref 3.8–10.5)
WBC # FLD AUTO: 9.26 K/UL — SIGNIFICANT CHANGE UP (ref 3.8–10.5)
WBC # FLD AUTO: 9.69 K/UL — SIGNIFICANT CHANGE UP (ref 3.8–10.5)

## 2024-06-14 PROCEDURE — 99223 1ST HOSP IP/OBS HIGH 75: CPT

## 2024-06-14 RX ADMIN — ERTAPENEM SODIUM 120 MILLIGRAM(S): 1 INJECTION, POWDER, LYOPHILIZED, FOR SOLUTION INTRAMUSCULAR; INTRAVENOUS at 11:20

## 2024-06-14 RX ADMIN — PANTOPRAZOLE SODIUM 40 MILLIGRAM(S): 40 INJECTION, POWDER, FOR SOLUTION INTRAVENOUS at 06:43

## 2024-06-14 RX ADMIN — LATANOPROST PF 1 DROP(S): 0.05 SOLUTION/ DROPS OPHTHALMIC at 22:02

## 2024-06-14 RX ADMIN — APIXABAN 2.5 MILLIGRAM(S): 5 TABLET, FILM COATED ORAL at 06:43

## 2024-06-14 RX ADMIN — APIXABAN 2.5 MILLIGRAM(S): 5 TABLET, FILM COATED ORAL at 17:33

## 2024-06-15 LAB
-  AMPICILLIN/SULBACTAM: SIGNIFICANT CHANGE UP
-  AMPICILLIN: SIGNIFICANT CHANGE UP
-  AZTREONAM: SIGNIFICANT CHANGE UP
-  CEFAZOLIN: SIGNIFICANT CHANGE UP
-  CEFEPIME: SIGNIFICANT CHANGE UP
-  CEFTRIAXONE: SIGNIFICANT CHANGE UP
-  CIPROFLOXACIN: SIGNIFICANT CHANGE UP
-  ERTAPENEM: SIGNIFICANT CHANGE UP
-  GENTAMICIN: SIGNIFICANT CHANGE UP
-  LEVOFLOXACIN: SIGNIFICANT CHANGE UP
-  MEROPENEM: SIGNIFICANT CHANGE UP
-  PIPERACILLIN/TAZOBACTAM: SIGNIFICANT CHANGE UP
-  TOBRAMYCIN: SIGNIFICANT CHANGE UP
-  TRIMETHOPRIM/SULFAMETHOXAZOLE: SIGNIFICANT CHANGE UP
ANION GAP SERPL CALC-SCNC: 5 MMOL/L — SIGNIFICANT CHANGE UP (ref 5–17)
BUN SERPL-MCNC: 18 MG/DL — SIGNIFICANT CHANGE UP (ref 7–23)
CALCIUM SERPL-MCNC: 8.4 MG/DL — LOW (ref 8.5–10.1)
CHLORIDE SERPL-SCNC: 105 MMOL/L — SIGNIFICANT CHANGE UP (ref 96–108)
CO2 SERPL-SCNC: 28 MMOL/L — SIGNIFICANT CHANGE UP (ref 22–31)
CREAT SERPL-MCNC: 0.71 MG/DL — SIGNIFICANT CHANGE UP (ref 0.5–1.3)
CULTURE RESULTS: ABNORMAL
EGFR: 85 ML/MIN/1.73M2 — SIGNIFICANT CHANGE UP
GLUCOSE SERPL-MCNC: 95 MG/DL — SIGNIFICANT CHANGE UP (ref 70–99)
HCT VFR BLD CALC: 25.7 % — LOW (ref 39–50)
HGB BLD-MCNC: 8.6 G/DL — LOW (ref 13–17)
MCHC RBC-ENTMCNC: 31.4 PG — SIGNIFICANT CHANGE UP (ref 27–34)
MCHC RBC-ENTMCNC: 33.5 GM/DL — SIGNIFICANT CHANGE UP (ref 32–36)
MCV RBC AUTO: 93.8 FL — SIGNIFICANT CHANGE UP (ref 80–100)
METHOD TYPE: SIGNIFICANT CHANGE UP
NRBC # BLD: 0 /100 WBCS — SIGNIFICANT CHANGE UP (ref 0–0)
ORGANISM # SPEC MICROSCOPIC CNT: ABNORMAL
ORGANISM # SPEC MICROSCOPIC CNT: ABNORMAL
ORGANISM # SPEC MICROSCOPIC CNT: SIGNIFICANT CHANGE UP
PLATELET # BLD AUTO: 292 K/UL — SIGNIFICANT CHANGE UP (ref 150–400)
POTASSIUM SERPL-MCNC: 3.8 MMOL/L — SIGNIFICANT CHANGE UP (ref 3.5–5.3)
POTASSIUM SERPL-SCNC: 3.8 MMOL/L — SIGNIFICANT CHANGE UP (ref 3.5–5.3)
RBC # BLD: 2.74 M/UL — LOW (ref 4.2–5.8)
RBC # FLD: 16.4 % — HIGH (ref 10.3–14.5)
SODIUM SERPL-SCNC: 138 MMOL/L — SIGNIFICANT CHANGE UP (ref 135–145)
SPECIMEN SOURCE: SIGNIFICANT CHANGE UP
WBC # BLD: 7.78 K/UL — SIGNIFICANT CHANGE UP (ref 3.8–10.5)
WBC # FLD AUTO: 7.78 K/UL — SIGNIFICANT CHANGE UP (ref 3.8–10.5)

## 2024-06-15 PROCEDURE — 99232 SBSQ HOSP IP/OBS MODERATE 35: CPT

## 2024-06-15 RX ADMIN — ERTAPENEM SODIUM 120 MILLIGRAM(S): 1 INJECTION, POWDER, LYOPHILIZED, FOR SOLUTION INTRAMUSCULAR; INTRAVENOUS at 12:01

## 2024-06-15 RX ADMIN — APIXABAN 2.5 MILLIGRAM(S): 5 TABLET, FILM COATED ORAL at 17:51

## 2024-06-15 RX ADMIN — APIXABAN 2.5 MILLIGRAM(S): 5 TABLET, FILM COATED ORAL at 06:36

## 2024-06-15 RX ADMIN — PANTOPRAZOLE SODIUM 40 MILLIGRAM(S): 40 INJECTION, POWDER, FOR SOLUTION INTRAVENOUS at 06:36

## 2024-06-16 LAB
-  AMPICILLIN/SULBACTAM: SIGNIFICANT CHANGE UP
-  AMPICILLIN: SIGNIFICANT CHANGE UP
-  AZTREONAM: SIGNIFICANT CHANGE UP
-  CEFAZOLIN: SIGNIFICANT CHANGE UP
-  CEFEPIME: SIGNIFICANT CHANGE UP
-  CEFTRIAXONE: SIGNIFICANT CHANGE UP
-  CEFUROXIME: SIGNIFICANT CHANGE UP
-  CIPROFLOXACIN: SIGNIFICANT CHANGE UP
-  ERTAPENEM: SIGNIFICANT CHANGE UP
-  GENTAMICIN: SIGNIFICANT CHANGE UP
-  IMIPENEM: SIGNIFICANT CHANGE UP
-  LEVOFLOXACIN: SIGNIFICANT CHANGE UP
-  MEROPENEM: SIGNIFICANT CHANGE UP
-  NITROFURANTOIN: SIGNIFICANT CHANGE UP
-  PIPERACILLIN/TAZOBACTAM: SIGNIFICANT CHANGE UP
-  TOBRAMYCIN: SIGNIFICANT CHANGE UP
-  TRIMETHOPRIM/SULFAMETHOXAZOLE: SIGNIFICANT CHANGE UP
ANION GAP SERPL CALC-SCNC: 6 MMOL/L — SIGNIFICANT CHANGE UP (ref 5–17)
BUN SERPL-MCNC: 15 MG/DL — SIGNIFICANT CHANGE UP (ref 7–23)
CALCIUM SERPL-MCNC: 8.5 MG/DL — SIGNIFICANT CHANGE UP (ref 8.5–10.1)
CHLORIDE SERPL-SCNC: 107 MMOL/L — SIGNIFICANT CHANGE UP (ref 96–108)
CO2 SERPL-SCNC: 25 MMOL/L — SIGNIFICANT CHANGE UP (ref 22–31)
CREAT SERPL-MCNC: 0.78 MG/DL — SIGNIFICANT CHANGE UP (ref 0.5–1.3)
CULTURE RESULTS: ABNORMAL
EGFR: 83 ML/MIN/1.73M2 — SIGNIFICANT CHANGE UP
GLUCOSE SERPL-MCNC: 83 MG/DL — SIGNIFICANT CHANGE UP (ref 70–99)
HCT VFR BLD CALC: 26.9 % — LOW (ref 39–50)
HGB BLD-MCNC: 8.9 G/DL — LOW (ref 13–17)
MCHC RBC-ENTMCNC: 31.1 PG — SIGNIFICANT CHANGE UP (ref 27–34)
MCHC RBC-ENTMCNC: 33.1 GM/DL — SIGNIFICANT CHANGE UP (ref 32–36)
MCV RBC AUTO: 94.1 FL — SIGNIFICANT CHANGE UP (ref 80–100)
METHOD TYPE: SIGNIFICANT CHANGE UP
NRBC # BLD: 0 /100 WBCS — SIGNIFICANT CHANGE UP (ref 0–0)
ORGANISM # SPEC MICROSCOPIC CNT: ABNORMAL
ORGANISM # SPEC MICROSCOPIC CNT: SIGNIFICANT CHANGE UP
PLATELET # BLD AUTO: 274 K/UL — SIGNIFICANT CHANGE UP (ref 150–400)
POTASSIUM SERPL-MCNC: 4.3 MMOL/L — SIGNIFICANT CHANGE UP (ref 3.5–5.3)
POTASSIUM SERPL-SCNC: 4.3 MMOL/L — SIGNIFICANT CHANGE UP (ref 3.5–5.3)
RBC # BLD: 2.86 M/UL — LOW (ref 4.2–5.8)
RBC # FLD: 16 % — HIGH (ref 10.3–14.5)
SODIUM SERPL-SCNC: 138 MMOL/L — SIGNIFICANT CHANGE UP (ref 135–145)
SPECIMEN SOURCE: SIGNIFICANT CHANGE UP
WBC # BLD: 7.59 K/UL — SIGNIFICANT CHANGE UP (ref 3.8–10.5)
WBC # FLD AUTO: 7.59 K/UL — SIGNIFICANT CHANGE UP (ref 3.8–10.5)

## 2024-06-16 PROCEDURE — 99232 SBSQ HOSP IP/OBS MODERATE 35: CPT

## 2024-06-16 RX ORDER — DORZOLAMIDE HCL 2 %
1 DROPS OPHTHALMIC (EYE)
Refills: 0 | Status: DISCONTINUED | OUTPATIENT
Start: 2024-06-16 | End: 2024-06-19

## 2024-06-16 RX ORDER — POLYETHYLENE GLYCOL 3350 1 G/G
17 POWDER ORAL EVERY 24 HOURS
Refills: 0 | Status: DISCONTINUED | OUTPATIENT
Start: 2024-06-16 | End: 2024-06-19

## 2024-06-16 RX ADMIN — APIXABAN 2.5 MILLIGRAM(S): 5 TABLET, FILM COATED ORAL at 05:54

## 2024-06-16 RX ADMIN — POLYETHYLENE GLYCOL 3350 17 GRAM(S): 1 POWDER ORAL at 22:30

## 2024-06-16 RX ADMIN — LATANOPROST PF 1 DROP(S): 0.05 SOLUTION/ DROPS OPHTHALMIC at 23:23

## 2024-06-16 RX ADMIN — ERTAPENEM SODIUM 120 MILLIGRAM(S): 1 INJECTION, POWDER, LYOPHILIZED, FOR SOLUTION INTRAMUSCULAR; INTRAVENOUS at 12:06

## 2024-06-16 RX ADMIN — APIXABAN 2.5 MILLIGRAM(S): 5 TABLET, FILM COATED ORAL at 18:23

## 2024-06-16 RX ADMIN — Medication 1 DROP(S): at 12:06

## 2024-06-16 RX ADMIN — PANTOPRAZOLE SODIUM 40 MILLIGRAM(S): 40 INJECTION, POWDER, FOR SOLUTION INTRAVENOUS at 05:54

## 2024-06-17 LAB
ANION GAP SERPL CALC-SCNC: 5 MMOL/L — SIGNIFICANT CHANGE UP (ref 5–17)
BUN SERPL-MCNC: 15 MG/DL — SIGNIFICANT CHANGE UP (ref 7–23)
CALCIUM SERPL-MCNC: 8.5 MG/DL — SIGNIFICANT CHANGE UP (ref 8.5–10.1)
CHLORIDE SERPL-SCNC: 107 MMOL/L — SIGNIFICANT CHANGE UP (ref 96–108)
CO2 SERPL-SCNC: 26 MMOL/L — SIGNIFICANT CHANGE UP (ref 22–31)
CREAT SERPL-MCNC: 0.9 MG/DL — SIGNIFICANT CHANGE UP (ref 0.5–1.3)
EGFR: 79 ML/MIN/1.73M2 — SIGNIFICANT CHANGE UP
GLUCOSE SERPL-MCNC: 93 MG/DL — SIGNIFICANT CHANGE UP (ref 70–99)
HCT VFR BLD CALC: 26.1 % — LOW (ref 39–50)
HGB BLD-MCNC: 8.7 G/DL — LOW (ref 13–17)
MCHC RBC-ENTMCNC: 31.2 PG — SIGNIFICANT CHANGE UP (ref 27–34)
MCHC RBC-ENTMCNC: 33.3 GM/DL — SIGNIFICANT CHANGE UP (ref 32–36)
MCV RBC AUTO: 93.5 FL — SIGNIFICANT CHANGE UP (ref 80–100)
NRBC # BLD: 0 /100 WBCS — SIGNIFICANT CHANGE UP (ref 0–0)
PLATELET # BLD AUTO: 322 K/UL — SIGNIFICANT CHANGE UP (ref 150–400)
POTASSIUM SERPL-MCNC: 4.4 MMOL/L — SIGNIFICANT CHANGE UP (ref 3.5–5.3)
POTASSIUM SERPL-SCNC: 4.4 MMOL/L — SIGNIFICANT CHANGE UP (ref 3.5–5.3)
RBC # BLD: 2.79 M/UL — LOW (ref 4.2–5.8)
RBC # FLD: 15.9 % — HIGH (ref 10.3–14.5)
SODIUM SERPL-SCNC: 138 MMOL/L — SIGNIFICANT CHANGE UP (ref 135–145)
WBC # BLD: 8.85 K/UL — SIGNIFICANT CHANGE UP (ref 3.8–10.5)
WBC # FLD AUTO: 8.85 K/UL — SIGNIFICANT CHANGE UP (ref 3.8–10.5)

## 2024-06-17 PROCEDURE — 36410 VNPNXR 3YR/> PHY/QHP DX/THER: CPT

## 2024-06-17 PROCEDURE — 36000 PLACE NEEDLE IN VEIN: CPT

## 2024-06-17 PROCEDURE — 99232 SBSQ HOSP IP/OBS MODERATE 35: CPT

## 2024-06-17 PROCEDURE — 76937 US GUIDE VASCULAR ACCESS: CPT | Mod: 26

## 2024-06-17 RX ADMIN — PANTOPRAZOLE SODIUM 40 MILLIGRAM(S): 40 INJECTION, POWDER, FOR SOLUTION INTRAVENOUS at 06:31

## 2024-06-17 RX ADMIN — Medication 1 DROP(S): at 10:15

## 2024-06-17 RX ADMIN — ERTAPENEM SODIUM 120 MILLIGRAM(S): 1 INJECTION, POWDER, LYOPHILIZED, FOR SOLUTION INTRAMUSCULAR; INTRAVENOUS at 11:47

## 2024-06-17 RX ADMIN — APIXABAN 2.5 MILLIGRAM(S): 5 TABLET, FILM COATED ORAL at 06:31

## 2024-06-17 RX ADMIN — APIXABAN 2.5 MILLIGRAM(S): 5 TABLET, FILM COATED ORAL at 18:18

## 2024-06-17 RX ADMIN — LATANOPROST PF 1 DROP(S): 0.05 SOLUTION/ DROPS OPHTHALMIC at 21:17

## 2024-06-18 LAB
ANION GAP SERPL CALC-SCNC: 6 MMOL/L — SIGNIFICANT CHANGE UP (ref 5–17)
BUN SERPL-MCNC: 15 MG/DL — SIGNIFICANT CHANGE UP (ref 7–23)
CALCIUM SERPL-MCNC: 8.6 MG/DL — SIGNIFICANT CHANGE UP (ref 8.5–10.1)
CHLORIDE SERPL-SCNC: 105 MMOL/L — SIGNIFICANT CHANGE UP (ref 96–108)
CO2 SERPL-SCNC: 26 MMOL/L — SIGNIFICANT CHANGE UP (ref 22–31)
CREAT SERPL-MCNC: 0.8 MG/DL — SIGNIFICANT CHANGE UP (ref 0.5–1.3)
CULTURE RESULTS: SIGNIFICANT CHANGE UP
EGFR: 82 ML/MIN/1.73M2 — SIGNIFICANT CHANGE UP
GLUCOSE SERPL-MCNC: 82 MG/DL — SIGNIFICANT CHANGE UP (ref 70–99)
HCT VFR BLD CALC: 26.7 % — LOW (ref 39–50)
HGB BLD-MCNC: 8.7 G/DL — LOW (ref 13–17)
MCHC RBC-ENTMCNC: 30.7 PG — SIGNIFICANT CHANGE UP (ref 27–34)
MCHC RBC-ENTMCNC: 32.6 GM/DL — SIGNIFICANT CHANGE UP (ref 32–36)
MCV RBC AUTO: 94.3 FL — SIGNIFICANT CHANGE UP (ref 80–100)
NRBC # BLD: 0 /100 WBCS — SIGNIFICANT CHANGE UP (ref 0–0)
PLATELET # BLD AUTO: 323 K/UL — SIGNIFICANT CHANGE UP (ref 150–400)
POTASSIUM SERPL-MCNC: 4 MMOL/L — SIGNIFICANT CHANGE UP (ref 3.5–5.3)
POTASSIUM SERPL-SCNC: 4 MMOL/L — SIGNIFICANT CHANGE UP (ref 3.5–5.3)
RBC # BLD: 2.83 M/UL — LOW (ref 4.2–5.8)
RBC # FLD: 16 % — HIGH (ref 10.3–14.5)
SODIUM SERPL-SCNC: 137 MMOL/L — SIGNIFICANT CHANGE UP (ref 135–145)
SPECIMEN SOURCE: SIGNIFICANT CHANGE UP
WBC # BLD: 9.52 K/UL — SIGNIFICANT CHANGE UP (ref 3.8–10.5)
WBC # FLD AUTO: 9.52 K/UL — SIGNIFICANT CHANGE UP (ref 3.8–10.5)

## 2024-06-18 PROCEDURE — 99232 SBSQ HOSP IP/OBS MODERATE 35: CPT

## 2024-06-18 RX ADMIN — PANTOPRAZOLE SODIUM 40 MILLIGRAM(S): 40 INJECTION, POWDER, FOR SOLUTION INTRAVENOUS at 06:02

## 2024-06-18 RX ADMIN — ERTAPENEM SODIUM 120 MILLIGRAM(S): 1 INJECTION, POWDER, LYOPHILIZED, FOR SOLUTION INTRAMUSCULAR; INTRAVENOUS at 13:59

## 2024-06-18 RX ADMIN — APIXABAN 2.5 MILLIGRAM(S): 5 TABLET, FILM COATED ORAL at 18:09

## 2024-06-18 RX ADMIN — Medication 1 DROP(S): at 09:08

## 2024-06-18 RX ADMIN — APIXABAN 2.5 MILLIGRAM(S): 5 TABLET, FILM COATED ORAL at 06:02

## 2024-06-18 RX ADMIN — POLYETHYLENE GLYCOL 3350 17 GRAM(S): 1 POWDER ORAL at 22:04

## 2024-06-18 RX ADMIN — LATANOPROST PF 1 DROP(S): 0.05 SOLUTION/ DROPS OPHTHALMIC at 22:04

## 2024-06-19 ENCOUNTER — TRANSCRIPTION ENCOUNTER (OUTPATIENT)
Age: 89
End: 2024-06-19

## 2024-06-19 VITALS
RESPIRATION RATE: 18 BRPM | TEMPERATURE: 97 F | SYSTOLIC BLOOD PRESSURE: 170 MMHG | DIASTOLIC BLOOD PRESSURE: 58 MMHG | HEART RATE: 51 BPM | OXYGEN SATURATION: 100 %

## 2024-06-19 PROCEDURE — 87086 URINE CULTURE/COLONY COUNT: CPT

## 2024-06-19 PROCEDURE — 99232 SBSQ HOSP IP/OBS MODERATE 35: CPT

## 2024-06-19 PROCEDURE — 86850 RBC ANTIBODY SCREEN: CPT

## 2024-06-19 PROCEDURE — 36410 VNPNXR 3YR/> PHY/QHP DX/THER: CPT

## 2024-06-19 PROCEDURE — 87077 CULTURE AEROBIC IDENTIFY: CPT

## 2024-06-19 PROCEDURE — 81001 URINALYSIS AUTO W/SCOPE: CPT

## 2024-06-19 PROCEDURE — 85730 THROMBOPLASTIN TIME PARTIAL: CPT

## 2024-06-19 PROCEDURE — 85610 PROTHROMBIN TIME: CPT

## 2024-06-19 PROCEDURE — 80048 BASIC METABOLIC PNL TOTAL CA: CPT

## 2024-06-19 PROCEDURE — 96366 THER/PROPH/DIAG IV INF ADDON: CPT

## 2024-06-19 PROCEDURE — 85025 COMPLETE CBC W/AUTO DIFF WBC: CPT

## 2024-06-19 PROCEDURE — 96365 THER/PROPH/DIAG IV INF INIT: CPT

## 2024-06-19 PROCEDURE — 80053 COMPREHEN METABOLIC PANEL: CPT

## 2024-06-19 PROCEDURE — 87040 BLOOD CULTURE FOR BACTERIA: CPT

## 2024-06-19 PROCEDURE — 87150 DNA/RNA AMPLIFIED PROBE: CPT

## 2024-06-19 PROCEDURE — 83605 ASSAY OF LACTIC ACID: CPT

## 2024-06-19 PROCEDURE — 87186 SC STD MICRODIL/AGAR DIL: CPT

## 2024-06-19 PROCEDURE — 76937 US GUIDE VASCULAR ACCESS: CPT

## 2024-06-19 PROCEDURE — 85027 COMPLETE CBC AUTOMATED: CPT

## 2024-06-19 PROCEDURE — 76775 US EXAM ABDO BACK WALL LIM: CPT

## 2024-06-19 PROCEDURE — 71045 X-RAY EXAM CHEST 1 VIEW: CPT

## 2024-06-19 PROCEDURE — 93306 TTE W/DOPPLER COMPLETE: CPT

## 2024-06-19 PROCEDURE — 87637 SARSCOV2&INF A&B&RSV AMP PRB: CPT

## 2024-06-19 PROCEDURE — 96367 TX/PROPH/DG ADDL SEQ IV INF: CPT

## 2024-06-19 PROCEDURE — 93005 ELECTROCARDIOGRAM TRACING: CPT

## 2024-06-19 PROCEDURE — 86900 BLOOD TYPING SEROLOGIC ABO: CPT

## 2024-06-19 PROCEDURE — 96361 HYDRATE IV INFUSION ADD-ON: CPT

## 2024-06-19 PROCEDURE — 86901 BLOOD TYPING SEROLOGIC RH(D): CPT

## 2024-06-19 PROCEDURE — 99285 EMERGENCY DEPT VISIT HI MDM: CPT | Mod: 25

## 2024-06-19 PROCEDURE — 36573 INSJ PICC RS&I 5 YR+: CPT

## 2024-06-19 PROCEDURE — 36415 COLL VENOUS BLD VENIPUNCTURE: CPT

## 2024-06-19 RX ORDER — PANTOPRAZOLE SODIUM 40 MG/10ML
1 INJECTION, POWDER, FOR SOLUTION INTRAVENOUS
Qty: 0 | Refills: 0 | DISCHARGE
Start: 2024-06-19

## 2024-06-19 RX ORDER — ERTAPENEM SODIUM 1 G/1
1 INJECTION, POWDER, LYOPHILIZED, FOR SOLUTION INTRAMUSCULAR; INTRAVENOUS
Qty: 0 | Refills: 0 | DISCHARGE
Start: 2024-06-19

## 2024-06-19 RX ORDER — LOSARTAN POTASSIUM 100 MG/1
1 TABLET, FILM COATED ORAL
Refills: 0 | DISCHARGE

## 2024-06-19 RX ORDER — LOSARTAN POTASSIUM 100 MG/1
1 TABLET, FILM COATED ORAL
Qty: 30 | Refills: 0
Start: 2024-06-19

## 2024-06-19 RX ADMIN — APIXABAN 2.5 MILLIGRAM(S): 5 TABLET, FILM COATED ORAL at 06:24

## 2024-06-19 RX ADMIN — ERTAPENEM SODIUM 120 MILLIGRAM(S): 1 INJECTION, POWDER, LYOPHILIZED, FOR SOLUTION INTRAMUSCULAR; INTRAVENOUS at 11:27

## 2024-06-19 RX ADMIN — Medication 1 DROP(S): at 11:31

## 2024-06-19 RX ADMIN — PANTOPRAZOLE SODIUM 40 MILLIGRAM(S): 40 INJECTION, POWDER, FOR SOLUTION INTRAVENOUS at 08:00

## 2024-06-20 RX ORDER — PANTOPRAZOLE SODIUM 40 MG/10ML
1 INJECTION, POWDER, FOR SOLUTION INTRAVENOUS
Qty: 30 | Refills: 0
Start: 2024-06-20

## 2024-12-19 NOTE — CARDIOLOGY SUMMARY
[___] : [unfilled] [No Ischemia] : no Ischemia [No Exercise Ind Arr] : no exercise induced arrhythmias [No Symptoms] : no Symptoms [LVEF ___%] : LVEF [unfilled]% [None] : normal LV function <-- Click to add NO significant Past Surgical History [Mild] : mild pulmonary hypertension [Enlarged] : enlarged LA size [Moderate] : moderate mitral regurgitation

## 2025-01-15 NOTE — ED ADULT NURSE NOTE - PRIMARY CARE PROVIDER
-- DO NOT REPLY / DO NOT REPLY ALL --  -- This inbox is not monitored. If this was sent to the wrong provider or department, reroute message to P [facility] Reroute pool. --  -- Message is from Engagement Center Operations (ECO) --    General Patient Message: Pharmacy calling stating Doxycycline calcium is not available in strength anymore.  Please call back to discuss after 7:15 PM   Caller Information       Contact Date/Time Type Contact Phone/Fax    01/14/2025 06:40 PM CST Phone (Incoming) [id number] [place], [place] - [address] AT [facility] [address](B) (Pharmacy) [phone]            Alternative phone number: No     Can a detailed message be left? Yes - Voicemail   Patient has been advised the message will be addressed within 2-3 business days.              Copied from CRM #[id number]. Topic: MW Medication/Rx - MW Patient Calling for RX Needs  >> Jan 14, 2025  6:42 PM Pratima BAILEY wrote:  Gonzalez called with Question about medication during working hrs.   Selected 'Wrap Up CRM' and created new Telephone Encounter after clicking 'Convert to Clinical Call'. Selected reason for call 'Medication Issue'. Sent Med template and routed as routine priority per Care Site Dept KB page for Med Refill Working Hrs support to appropriate clinical pool. Readback full message.  
Dr. Leger
